# Patient Record
Sex: MALE | Race: BLACK OR AFRICAN AMERICAN | NOT HISPANIC OR LATINO | Employment: OTHER | URBAN - METROPOLITAN AREA
[De-identification: names, ages, dates, MRNs, and addresses within clinical notes are randomized per-mention and may not be internally consistent; named-entity substitution may affect disease eponyms.]

---

## 2017-01-05 RX ORDER — FOLIC ACID 1 MG/1
TABLET ORAL
Qty: 30 TABLET | Refills: 3 | Status: SHIPPED | OUTPATIENT
Start: 2017-01-05 | End: 2017-04-11 | Stop reason: SDUPTHER

## 2017-01-20 ENCOUNTER — OFFICE VISIT (OUTPATIENT)
Dept: OPHTHALMOLOGY | Facility: CLINIC | Age: 57
End: 2017-01-20
Payer: MEDICARE

## 2017-01-20 DIAGNOSIS — H40.43X3 UVEITIC GLAUCOMA, BILATERAL, SEVERE STAGE: Primary | ICD-10-CM

## 2017-01-20 DIAGNOSIS — H54.8 LEGAL BLINDNESS: ICD-10-CM

## 2017-01-20 DIAGNOSIS — H57.09 RELATIVE AFFERENT PUPILLARY DEFECT: ICD-10-CM

## 2017-01-20 DIAGNOSIS — H26.491 PCO (POSTERIOR CAPSULAR OPACIFICATION), RIGHT: ICD-10-CM

## 2017-01-20 DIAGNOSIS — H40.043 STEROID RESPONDERS BORDERLINE GLAUCOMA, BILATERAL: ICD-10-CM

## 2017-01-20 DIAGNOSIS — H47.299: ICD-10-CM

## 2017-01-20 DIAGNOSIS — H21.523 PAS (PERIPH ANTERIOR SYNECHIAE), BILATERAL: ICD-10-CM

## 2017-01-20 DIAGNOSIS — H20.9 UVEITIC GLAUCOMA, BILATERAL, SEVERE STAGE: Primary | ICD-10-CM

## 2017-01-20 DIAGNOSIS — D86.83 IRIDOCYCLITIS DUE TO SARCOIDOSIS: ICD-10-CM

## 2017-01-20 DIAGNOSIS — Z96.1 PSEUDOPHAKIA OF BOTH EYES: ICD-10-CM

## 2017-01-20 PROCEDURE — 99499 UNLISTED E&M SERVICE: CPT | Mod: S$GLB,,, | Performed by: OPHTHALMOLOGY

## 2017-01-20 PROCEDURE — 99999 PR PBB SHADOW E&M-EST. PATIENT-LVL III: CPT | Mod: PBBFAC,,, | Performed by: OPHTHALMOLOGY

## 2017-01-20 PROCEDURE — 92012 INTRM OPH EXAM EST PATIENT: CPT | Mod: S$GLB,,, | Performed by: OPHTHALMOLOGY

## 2017-01-20 NOTE — PROGRESS NOTES
HPI     DLS: 10/21/16    Pt here for 3 month check;    Meds: Combigan bid ou            Travatan qhs os            Dorzolamide tid ou            PA every other day od    1. 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 Uveitic glaucoma, bilateral, severe stage  3. Post inflammatory optic atrophy, unspecified laterality   4. PCO (posterior capsular opacification), right  5. Steroid responders borderline glaucoma, bilateral  6. Relative afferent pupillary defect, left eye  7. PAS (periph anterior synechiae), bilateral  8. Legal blindness, both eyes  9. Pseudophakia, both eyes        Last edited by Candace Fan on 1/20/2017  8:15 AM.         Assessment /Plan     For exam results, see Encounter Report.    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 Uveitic glaucoma - Both Eyes OD>OS  2/2 sarcoidosis  Old pt of Dr. Wagner and Dr. Menard  First HVF 2012  First photos 2011     Family history   neg  Glaucoma meds   Off all drops and diamox 2013 to 2014  (pt lost insurance)(( had +++ progression off gtts)                              back on gtts combigan ou bid / travatan os q day  / PA od every other day - 3 x week  H/O adverse rxn to glaucoma drops  None  LASERS   none  GLAUCOMA SURGERIES   Trab OD (3/20/ 2010 -Derian),  "Bleb Revision OD  3/21/2012 Loft// complex phaco/Baerveldt od 9/24/2014  OTHER EYE SURGERIES   PPV/Endo O5, CE OS (04)  CDR  0.95/0.5  Tbase  ?    Tmax  36/22 on meds      Ttarget   16/20       HVF  10-2 stim III OD (3/5/12)-sup arc,inf alt, tiny residual island OD; 24-2 OS w/ marked general depression             3/4/2013 ---HVF 10-2 stim III OD - SAD / Inf Alt los s           2 test 6591-0103  24-2 - stim V OS  // Inf Alt loss /worsening SAD            4 test 10-2 stim  V 2014 to 2016 - very tiny residual island of vision - essentially ext.od // gen dep - poor test     Gonio -  PAS OD - 360 // OS -  f    CCT  525/531  OCT  4 test 2011 to 2016 - dec. S/I/N od // Dec. I, Bord N  HRT  6 test 2012 to 2016 - MR -  Dec. S, bord I/N od // xx os /// CDR 0.76 od // xx os  Disc photos  2011, 2013, 2014, 2015  - OIS     Ttoday 10/14 (IOP better ou with addition of dorzolamide)   Test done today -IOP    2. Secondary iritis - Both Eyes      3. 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 Postinflammatory optic atrophy - Left Eye   -Sarcoid, + APD, uncertain etiology     5. Relative afferent pupillary defect OS     6. Steroid responders borderline glaucoma - Both Eyes -increased IOP w/ Pred Acetate. Does better w/ Vexol     7. Legal blindness - Both EyeS OD based on VF loss and OS based on VA.     8. Post-operative state - Both Eyes   -s/p bleb revision OD (3/21/12) had extensive subconj fibrosis, but eventually has done well.     9 Pseudophakic OD  S/P complex phaco/IOL / baerveldt od 9/24/2014   PC IOL os - done years ago - ? [facility] - ? IOL sewn in        Plan:  Uveitic glaucoma  Angle closure - PAS ou   Lost to F/U for 10 months from 9/2013 to 7/2014  - VA od went  from 20/40 to LP  Was already CF at 3 ft os- 2/2 ON pallor from sarcoid  IOP OD was 64 - off drops since 9/2013 - when re-presented     Pt is "legally blind" - he may want to apply for disability.    Pt previously had issues with insurance, but now " has new insurance.  Discussed with patient that the vision loss od is irreversible 2/2 glaucoma and the importance of compliance with gtt/meds.     IOP continues to be improved od post tube and back on combigan and dorzolamide   PA  od - for chronic iritis - uses PA every other day  od      - may eventually need yag to PCO od  - but this will trigger a big flare up of the iritis so hold off as long as possible  The anterior chamber fibrin has resolved  - pre treat with steroid gtts and ? PO medrol dose khadra - on MTX   Pt will think about it      Pt has seen the pulmonologist  Has seen an internist   Did not see rheumatology - but they can be consulted if steroid sparing immunosuppression is needed to control the iritis // saw natalie 11/9/2012    Now on MTX for sarcoid and iritis - seems to be helping     IOP was creeping up ou to 17/18 (10/21/2016) better today at 10/14 with addition of dorzolamide (1/20/2017)  Cont combigan  bid ou  Cont PG's (travatan ) OS only  - cont  dorzolamide OU -  twice daily ou   - cont with  PA OD every other day OD      F/U 3 months - try HRT / gonio (may not be able to get HRT 2/2 PCO od and tilted lens OS)     Pt is getting training (first step) at the [facility] - says it is going well.

## 2017-02-06 ENCOUNTER — OFFICE VISIT (OUTPATIENT)
Dept: RHEUMATOLOGY | Facility: CLINIC | Age: 57
End: 2017-02-06
Payer: MEDICARE

## 2017-02-06 VITALS
WEIGHT: 196.31 LBS | DIASTOLIC BLOOD PRESSURE: 78 MMHG | SYSTOLIC BLOOD PRESSURE: 121 MMHG | HEART RATE: 67 BPM | BODY MASS INDEX: 29.07 KG/M2 | HEIGHT: 69 IN | TEMPERATURE: 98 F

## 2017-02-06 DIAGNOSIS — H20.9 UVEITIC GLAUCOMA, BILATERAL, SEVERE STAGE: ICD-10-CM

## 2017-02-06 DIAGNOSIS — D86.9 SARCOIDOSIS: Primary | ICD-10-CM

## 2017-02-06 DIAGNOSIS — D86.83 IRIDOCYCLITIS DUE TO SARCOIDOSIS: ICD-10-CM

## 2017-02-06 DIAGNOSIS — H40.43X3 UVEITIC GLAUCOMA, BILATERAL, SEVERE STAGE: ICD-10-CM

## 2017-02-06 DIAGNOSIS — D84.9 IMMUNOSUPPRESSION: ICD-10-CM

## 2017-02-06 DIAGNOSIS — D86.0 SARCOIDOSIS OF LUNG: ICD-10-CM

## 2017-02-06 PROCEDURE — 99214 OFFICE O/P EST MOD 30 MIN: CPT | Mod: S$GLB,,, | Performed by: INTERNAL MEDICINE

## 2017-02-06 PROCEDURE — 99499 UNLISTED E&M SERVICE: CPT | Mod: S$GLB,,, | Performed by: INTERNAL MEDICINE

## 2017-02-06 PROCEDURE — 3074F SYST BP LT 130 MM HG: CPT | Mod: S$GLB,,, | Performed by: INTERNAL MEDICINE

## 2017-02-06 PROCEDURE — 3078F DIAST BP <80 MM HG: CPT | Mod: S$GLB,,, | Performed by: INTERNAL MEDICINE

## 2017-02-06 PROCEDURE — 99999 PR PBB SHADOW E&M-EST. PATIENT-LVL III: CPT | Mod: PBBFAC,,, | Performed by: INTERNAL MEDICINE

## 2017-02-06 RX ORDER — METHOTREXATE 2.5 MG/1
TABLET ORAL
Qty: 24 TABLET | Refills: 2 | Status: SHIPPED | OUTPATIENT
Start: 2017-02-06 | End: 2017-04-11 | Stop reason: SDUPTHER

## 2017-02-06 NOTE — PROGRESS NOTES
"Subjective:       Patient ID: Jaguar Lackey is a 56 y.o. male.    Chief Complaint: Disease Management      HPI:  Jaguar Lackey is a 56 y.o. 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    He was started on MTX as steroid sparing agent and is tolerating it well.  Currently on 4 tabs MTX and folic acid 1 mg daily.      Patient saw ophthamologist in January and she felt things were better.     Review of Systems   Constitutional: Negative for fatigue.   HENT: Negative.    Eyes: Negative.    Respiratory: Negative.         SOB with walking on treadmill and push ups   Cardiovascular: Negative.    Gastrointestinal: Negative.    Endocrine: Negative.    Genitourinary: Negative.    Musculoskeletal: Negative.    Skin: Negative.    Allergic/Immunologic: Negative.    Neurological: Negative.    Hematological: Negative.    Psychiatric/Behavioral:        Mild anxiety because of inability to do what he used to         Objective:     Visit Vitals    /78 (BP Location: Right arm, Patient Position: Sitting, BP Method: Automatic)    Pulse 67    Temp 97.8 °F (36.6 °C) (Oral)    Ht 5' 9" (1.753 m)    Wt 89 kg (196 lb 4.8 oz)    BMI 28.99 kg/m2        Physical Exam   Constitutional: He is oriented to person, place, and time and well-developed, well-nourished, and in no distress.   HENT:   Head: Normocephalic and atraumatic.   Eyes: Conjunctivae and EOM are normal.   Neck: Neck supple.   Cardiovascular: Normal rate, regular rhythm and normal heart sounds.    Pulmonary/Chest: Effort normal and breath sounds normal.   Abdominal: Soft. Bowel sounds are normal.   Neurological: He is alert and oriented to person, place, and time. Gait normal.   Skin: Skin is warm and dry.     Psychiatric: Mood and affect normal.   Musculoskeletal: Normal range of motion. He exhibits no edema, tenderness or deformity.           LABS    Component      " Latest Ref Rng & Units 11/28/2016   WBC      3.90 - 12.70 K/uL 9.37   RBC      4.60 - 6.20 M/uL 5.27   Hemoglobin      14.0 - 18.0 g/dL 14.7   Hematocrit      40.0 - 54.0 % 44.4   MCV      82 - 98 fL 84   MCH      27.0 - 31.0 pg 27.9   MCHC      32.0 - 36.0 % 33.1   RDW      11.5 - 14.5 % 15.2 (H)   Platelets      150 - 350 K/uL 333   MPV      9.2 - 12.9 fL 10.6   Gran #      1.8 - 7.7 K/uL 4.3   Lymph #      1.0 - 4.8 K/uL 3.6   Mono #      0.3 - 1.0 K/uL 1.2 (H)   Eos #      0.0 - 0.5 K/uL 0.3   Baso #      0.00 - 0.20 K/uL 0.05   Gran%      38.0 - 73.0 % 45.5   Lymph%      18.0 - 48.0 % 38.0   Mono%      4.0 - 15.0 % 12.5   Eosinophil%      0.0 - 8.0 % 2.9   Basophil%      0.0 - 1.9 % 0.5   Differential Method       Automated   Sodium      136 - 145 mmol/L 143   Potassium      3.5 - 5.1 mmol/L 4.0   Chloride      95 - 110 mmol/L 110   CO2      23 - 29 mmol/L 26   Glucose      70 - 110 mg/dL 108   BUN, Bld      6 - 20 mg/dL 16   Creatinine      0.5 - 1.4 mg/dL 1.0   Calcium      8.7 - 10.5 mg/dL 9.4   Total Protein      6.0 - 8.4 g/dL 7.5   Albumin      3.5 - 5.2 g/dL 3.9   Total Bilirubin      0.1 - 1.0 mg/dL 0.2   Alkaline Phosphatase      55 - 135 U/L 74   AST      10 - 40 U/L 23   ALT      10 - 44 U/L 39   Anion Gap      8 - 16 mmol/L 7 (L)   eGFR if African American      >60 mL/min/1.73 m:2 >60.0   eGFR if non African American      >60 mL/min/1.73 m:2 >60.0   CRP      0.0 - 8.2 mg/L 2.9   Sed Rate      0 - 10 mm/Hr 1     Assessment:       1. Sarcoidosis involving the lungs and the eyes. Most prominent is eye involvement.   No skin or joint involvement at this time. Possible skin involvement in the past.   On MTX as immunosuppression to help with eye involvement. Humira denied by insurance.   2. HTN. On medication   3. History of bilateral knee injections in the past.  4. Vitamin D deficiency. Patient on 50,000 units of vitamin D every 2 weeks.   5. Left shoulder pain.  Improved some.  Plan:       1. Check  labs.   2. Continue methotrexate. Consider increasing dose MTX 6 tabs daily. Message sent to Dr. Pratt today to see if this would be beneficial.  3. Humira denied by insurance.   4.  RTO 3 months/prn

## 2017-02-06 NOTE — MR AVS SNAPSHOT
[facility]  [address] [facility] [address] [facility] [address]  Phone: [phone]  Fax: [phone]                  Jaguar Lackey   2017 10:30 AM   Office Visit    Description:  Male : 1960   Provider:  Chhaya Desouza MD   Department:  [facility] - Rheumatology           Reason for Visit     Disease Management           Diagnoses this Visit        Comments    Sarcoidosis    -  Primary     Iridocyclitis due to sarcoidosis         Sarcoidosis of lung         Immunosuppression         Uveitic glaucoma, bilateral, severe stage                To Do List           Future Appointments        Provider Department Dept Phone    2017 9:45 AM LAB, APPOINTMENT [place] [facility] [phone]    2017 10:30 AM Chhaya Desouza MD [facility] [phone]    2017 8:30 AM PERIMETRY, [facility]MP[facility] [phone]    2017 9:45 AM Honey Pratt MD [facility] [phone]    2017 9:00 AM Chhaya Desouza MD [facility] [phone]      Goals (5 Years of Data)     None      Follow-Up and Disposition     Return in about 3 months (around 2017).    Follow-up and Disposition History       These Medications        Disp Refills Start End    methotrexate 2.5 MG Tab 24 tablet 2 2017     TAKE 6 TABLETS BY MOUTH EVERY 7 DAYS    Pharmacy: [facility] [address] [place] [address] AT [facility] Ph #: [phone]         [facility]     [facility] Care Line -  Assistance  Registered nurses in the [facility] provide clinical advisement, health education, appointment booking, and other advisory services.  Call for this free service at [phone].             Medications           Message regarding Medications     Verify the changes and/or additions to your medication regime listed below are the same as  "discussed with your clinician today.  If any of these changes or additions are incorrect, please notify your healthcare provider.             Verify that the below list of medications is an accurate representation of the medications you are currently taking.  If none reported, the list may be blank. If incorrect, please contact your healthcare provider. Carry this list with you in case of emergency.           Current Medications     brimonidine-timolol (COMBIGAN) 0.2-0.5 % Drop Place 1 drop into both eyes 2 (two) times daily.    dorzolamide (TRUSOPT) 2 % ophthalmic solution Place 1 drop into both eyes 2 (two) times daily.    folic acid (FOLVITE) 1 MG tablet Take 1 tablet (1,000 mcg total) by mouth once daily.    folic acid (FOLVITE) 1 MG tablet TAKE 1 TABLET BY MOUTH EVERY DAY    INV amlodipine (NORVASC) 10 MG tablet Take 1 tablet (10 mg total) by mouth once daily.    methotrexate 2.5 MG Tab TAKE 6 TABLETS BY MOUTH EVERY 7 DAYS    pravastatin (PRAVACHOL) 40 MG tablet TAKE ONE TABLET BY MOUTH ONCE DAILY.    prednisoLONE acetate (PRED FORTE) 1 % DrpS Place 1 drop into the right eye every other day.    TRAVATAN Z 0.004 % Drop INSTILL 1 DROP INTO LEFT EYE ONCE DAILY. THIS REPLACE THE PREVIOUS LATANOPROST EYE DROP. STILL ON COMBIGAN    VITAMIN D2 50,000 unit capsule TAKE ONE CAPSULE BY MOUTH ONCE A WEEK           Clinical Reference Information           Your Vitals Were     BP Pulse Temp Height Weight BMI    121/78 (BP Location: Right arm, Patient Position: Sitting, BP Method: Automatic) 67 97.8 °F (36.6 °C) (Oral) 5' 9" (1.753 m) 89 kg (196 lb 4.8 oz) 28.99 kg/m2      Blood Pressure          Most Recent Value    BP  121/78      Allergies as of 2/6/2017     No Known Allergies      Immunizations Administered on Date of Encounter - 2/6/2017     None      Orders Placed During Today's Visit     Recurring Lab Work Interval Expires    C-reactive protein  3 months until 2/6/2020 2/6/2020    CBC auto differential  3 months until " 2/6/2020 2/6/2020    Comprehensive metabolic panel  3 months until 2/6/2020 2/6/2020    Sedimentation rate, manual  3 months until 2/6/2020 2/6/2020      Language Assistance Services     ATTENTION: Language assistance services are available, free of charge. Please call [phone].      ATENCIÓN: Si habla español, tiene a hill disposición servicios gratuitos de asistencia lingüística. Llame al [phone].     [facility] Ý: N?u b?n nói Ti?ng Vi?t, có các d?ch v? h? tr? ngôn ng? mi?n phí dành cho b?n. G?i s? [phone].         [facility] complies with applicable Federal civil rights laws and does not discriminate on the basis of race, color, national origin, age, disability, or sex.

## 2017-02-07 ENCOUNTER — PATIENT MESSAGE (OUTPATIENT)
Dept: RHEUMATOLOGY | Facility: CLINIC | Age: 57
End: 2017-02-07

## 2017-02-27 RX ORDER — PRAVASTATIN SODIUM 40 MG/1
TABLET ORAL
Qty: 30 TABLET | Refills: 7 | Status: SHIPPED | OUTPATIENT
Start: 2017-02-27 | End: 2017-03-02

## 2017-03-01 ENCOUNTER — TELEPHONE (OUTPATIENT)
Dept: RHEUMATOLOGY | Facility: CLINIC | Age: 57
End: 2017-03-01

## 2017-03-01 NOTE — TELEPHONE ENCOUNTER
----- Message from Honey Pratt MD sent at 2/27/2017  5:52 PM CST -----  Not sure if I saw and answered this. Last time i saw him he looked good - minimal iritis. So I feel we can leave it at the level it is at now.   ----- Message -----     From: Chhaya Desouza MD     Sent: 2/6/2017   9:39 AM       To: Honey Pratt MD    Do you feel he would benefit from an increase in Methotrexate to 6 tabs?

## 2017-03-02 DIAGNOSIS — D86.9 SARCOIDOSIS: ICD-10-CM

## 2017-03-02 DIAGNOSIS — D86.83 IRIDOCYCLITIS DUE TO SARCOIDOSIS: ICD-10-CM

## 2017-03-02 DIAGNOSIS — H20.9 UVEITIC GLAUCOMA, BILATERAL, SEVERE STAGE: ICD-10-CM

## 2017-03-02 DIAGNOSIS — H40.43X3 UVEITIC GLAUCOMA, BILATERAL, SEVERE STAGE: ICD-10-CM

## 2017-03-02 RX ORDER — PRAVASTATIN SODIUM 40 MG/1
TABLET ORAL
Qty: 30 TABLET | Refills: 9 | Status: SHIPPED | OUTPATIENT
Start: 2017-03-02 | End: 2017-04-11 | Stop reason: SDUPTHER

## 2017-03-02 RX ORDER — METHOTREXATE 2.5 MG/1
TABLET ORAL
Qty: 16 TABLET | Refills: 0 | OUTPATIENT
Start: 2017-03-02

## 2017-03-03 RX ORDER — SULFASALAZINE 500 MG/1
TABLET, DELAYED RELEASE ORAL
Qty: 60 TABLET | Refills: 0 | OUTPATIENT
Start: 2017-03-03

## 2017-04-11 ENCOUNTER — TELEPHONE (OUTPATIENT)
Dept: RHEUMATOLOGY | Facility: CLINIC | Age: 57
End: 2017-04-11

## 2017-04-11 DIAGNOSIS — D86.83 IRIDOCYCLITIS DUE TO SARCOIDOSIS: ICD-10-CM

## 2017-04-11 DIAGNOSIS — H40.43X3 UVEITIC GLAUCOMA, BILATERAL, SEVERE STAGE: ICD-10-CM

## 2017-04-11 DIAGNOSIS — H20.9 UVEITIC GLAUCOMA, BILATERAL, SEVERE STAGE: ICD-10-CM

## 2017-04-11 DIAGNOSIS — D86.9 SARCOIDOSIS: ICD-10-CM

## 2017-04-11 RX ORDER — METHOTREXATE 2.5 MG/1
TABLET ORAL
Qty: 24 TABLET | Refills: 0 | Status: SHIPPED | OUTPATIENT
Start: 2017-04-11 | End: 2017-05-18 | Stop reason: SDUPTHER

## 2017-04-11 RX ORDER — FOLIC ACID 1 MG/1
1000 TABLET ORAL DAILY
Qty: 30 TABLET | Refills: 0 | Status: SHIPPED | OUTPATIENT
Start: 2017-04-11 | End: 2017-05-17 | Stop reason: SDUPTHER

## 2017-04-11 RX ORDER — METHOTREXATE 2.5 MG/1
TABLET ORAL
Qty: 24 TABLET | Refills: 2 | Status: CANCELLED | OUTPATIENT
Start: 2017-04-11

## 2017-04-11 NOTE — TELEPHONE ENCOUNTER
----- Message from Angelica Alaniz MA sent at 4/11/2017  2:36 PM CDT -----  Contact: self/home      ----- Message -----     From: Christiane Estes     Sent: 4/11/2017   2:31 PM       To: Elisha [facility]TM[facility] Staff    Pt would like every rx that is prescribed by Dr. Lyons to be transferred to [facility] on [address]

## 2017-04-11 NOTE — TELEPHONE ENCOUNTER
----- Message from Devika Fields sent at 4/11/2017  2:14 PM CDT -----  Contact: patient [phone]  Pt was supposed to have all of his medication transferred to [facility] since [facility] is no longer the preferred provider for [facility]. Pt said that they told him this was being handled 1 week ago and [facility] told him that they don't have any rx orders. Please call pt to advise.    [facility] [address]

## 2017-04-12 RX ORDER — PRAVASTATIN SODIUM 40 MG/1
40 TABLET ORAL DAILY
Qty: 30 TABLET | Refills: 9 | Status: SHIPPED | OUTPATIENT
Start: 2017-04-12 | End: 2017-07-24 | Stop reason: SDUPTHER

## 2017-04-12 RX ORDER — AMLODIPINE BESYLATE 10 MG/1
10 TABLET ORAL DAILY
Qty: 30 TABLET | Refills: 9 | Status: SHIPPED | OUTPATIENT
Start: 2017-04-12 | End: 2017-07-24 | Stop reason: SDUPTHER

## 2017-04-12 NOTE — TELEPHONE ENCOUNTER
----- Message from Carmenza Meneses sent at 4/12/2017  9:45 AM CDT -----  Contact: Patient  The patient needs all the prescriptions Dr. Desir prescribes for him transferred to [facility]    [id number] [address] [facility] [phone] (Phone)  [phone] (Fax)    The patient is out of his prescriptions.      Thanks!

## 2017-04-21 ENCOUNTER — CLINICAL SUPPORT (OUTPATIENT)
Dept: OPHTHALMOLOGY | Facility: CLINIC | Age: 57
End: 2017-04-21
Payer: MEDICARE

## 2017-04-21 ENCOUNTER — OFFICE VISIT (OUTPATIENT)
Dept: OPHTHALMOLOGY | Facility: CLINIC | Age: 57
End: 2017-04-21
Payer: MEDICARE

## 2017-04-21 DIAGNOSIS — H40.43X3 UVEITIC GLAUCOMA, BILATERAL, SEVERE STAGE: ICD-10-CM

## 2017-04-21 DIAGNOSIS — H20.9 UVEITIC GLAUCOMA, BILATERAL, SEVERE STAGE: Primary | ICD-10-CM

## 2017-04-21 DIAGNOSIS — H21.523 PAS (PERIPH ANTERIOR SYNECHIAE), BILATERAL: ICD-10-CM

## 2017-04-21 DIAGNOSIS — H40.043 STEROID RESPONDERS BORDERLINE GLAUCOMA, BILATERAL: ICD-10-CM

## 2017-04-21 DIAGNOSIS — D86.83 IRIDOCYCLITIS DUE TO SARCOIDOSIS: ICD-10-CM

## 2017-04-21 DIAGNOSIS — H47.292 POSTINFLAMMATORY OPTIC ATROPHY, LEFT: ICD-10-CM

## 2017-04-21 DIAGNOSIS — H18.422 BAND KERATOPATHY, LEFT: ICD-10-CM

## 2017-04-21 DIAGNOSIS — H57.09 RELATIVE AFFERENT PUPILLARY DEFECT: ICD-10-CM

## 2017-04-21 DIAGNOSIS — H26.491 PCO (POSTERIOR CAPSULAR OPACIFICATION), RIGHT: ICD-10-CM

## 2017-04-21 DIAGNOSIS — H54.8 LEGAL BLINDNESS: ICD-10-CM

## 2017-04-21 DIAGNOSIS — H40.43X3 UVEITIC GLAUCOMA, BILATERAL, SEVERE STAGE: Primary | ICD-10-CM

## 2017-04-21 DIAGNOSIS — H20.9 UVEITIC GLAUCOMA, BILATERAL, SEVERE STAGE: ICD-10-CM

## 2017-04-21 DIAGNOSIS — Z96.1 PSEUDOPHAKIA OF BOTH EYES: ICD-10-CM

## 2017-04-21 PROCEDURE — 99999 PR PBB SHADOW E&M-EST. PATIENT-LVL II: CPT | Mod: PBBFAC,,, | Performed by: OPHTHALMOLOGY

## 2017-04-21 PROCEDURE — 92014 COMPRE OPH EXAM EST PT 1/>: CPT | Mod: S$GLB,,, | Performed by: OPHTHALMOLOGY

## 2017-04-21 PROCEDURE — 99499 UNLISTED E&M SERVICE: CPT | Mod: S$GLB,,, | Performed by: OPHTHALMOLOGY

## 2017-04-21 PROCEDURE — 92134 CPTRZ OPH DX IMG PST SGM RTA: CPT | Mod: S$GLB,,, | Performed by: OPHTHALMOLOGY

## 2017-04-21 NOTE — PROGRESS NOTES
HPI     Glaucoma    Additional comments: HRT review today           Blurred Vision    Additional comments: Pt states his vision is more cloudy than before           Comments   DLS: 1/20/17    1) Uveitic Glaucoma OD>OS  2) Secondary Iritis  3) Sarcoidosis  4) APD OS  5) Steroid Responder  6) Optic Atrophy  7) PCIOL OU  8) Legally Blind    MEDS:  PA QOD OD  Combigan BID OU  Dorzolamide BID OU (PT USING OS ONLY NOT OU)  Travatan Z QHS OS            Last edited by Angelina Elizabeth MA on 4/21/2017  9:03 AM. (History)            Assessment /Plan     For exam results, see Encounter Report.    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 Uveitic glaucoma - Both Eyes OD>OS  2/2 sarcoidosis  Old pt of Dr. Wagner and Dr. Menard  First HVF 2012  First photos 2011     Family history   neg  Glaucoma meds   Off all drops and diamox 2013 to 2014  (pt lost insurance)(( had +++ progression off gtts)                              back on gtts combigan ou bid / travatan os q day  / PA od every other day - 3 x week  H/O adverse rxn to glaucoma drops  None  LASERS   none  GLAUCOMA SURGERIES   Trab OD (3/20/ 2010 -Derian), Bleb Revision OD  3/21/2012 Loft// complex phaco/Baerveldt od 9/24/2014  OTHER EYE SURGERIES   PPV/Endo O5, CE OS  "(04)  CDR  0.95/0.5  Tbase  ?    Tmax  36/22 on meds      Ttarget   16/20       HVF  10-2 stim III OD (3/5/12)-sup arc,inf alt, tiny residual island OD; 24-2 OS w/ marked general depression             3/4/2013 ---HVF 10-2 stim III OD - SAD / Inf Alt los s           2 test 3805-0000  24-2 - stim V OS  // Inf Alt loss /worsening SAD            4 test 10-2 stim  V 2014 to 2016 - very tiny residual island of vision - essentially ext.od // gen dep - poor test     Gonio -  PAS OD - 360 // OS -  f    CCT  525/531  OCT  4 test 2011 to 2016 - dec. S/I/N od // Dec. I, Bord N  HRT  7 test 2012 to 04/2017 - MR -  Dec. S, bord I/N od // xx os /// CDR 0.79 od // xx os  Disc photos  2011, 2013, 2014, 2015  - OIS     Ttoday 14/15   Test done today -IOP, HRT   2. Secondary iritis - Both Eyes      3. 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 Postinflammatory optic atrophy - Left Eye   -Sarcoid, + APD, uncertain etiology     5. Relative afferent pupillary defect OS     6. Steroid responders borderline glaucoma - Both Eyes -increased IOP w/ Pred Acetate. Does better w/ Vexol     7. Legal blindness - Both EyeS OD based on VF loss and OS based on VA.     8. Post-operative state - Both Eyes   -s/p bleb revision OD (3/21/12) had extensive subconj fibrosis, but eventually has done well.     9 Pseudophakic OD  S/P complex phaco/IOL / baerveldt od 9/24/2014   PC IOL os - done years ago - ? [facility] - ? IOL sewn in        Plan:  Uveitic glaucoma  Angle closure - PAS ou   Lost to F/U for 10 months from 9/2013 to 7/2014  - VA od went  from 20/40 to LP  Was already CF at 3 ft os- 2/2 ON pallor from sarcoid  IOP OD was 64 - off drops since 9/2013 - when re-presented     Pt is "legally blind" - he may want to apply for disability.    Pt previously had issues with insurance, but now has new insurance.  Discussed with patient that the vision loss od is irreversible 2/2 glaucoma and the importance of compliance with gtt/meds.     IOP " continues to be improved od post tube and back on combigan and dorzolamide   PA  od - for chronic iritis - uses PA every other day  od      - may eventually need yag to PCO od  - but this will trigger a big flare up of the iritis so hold off as long as possible  The anterior chamber fibrin has resolved  - pre treat with steroid gtts and ? PO medrol dose khadra - on MTX   Pt will think about it      Pt has seen the pulmonologist  Has seen an internist   Did not see rheumatology - but they can be consulted if steroid sparing immunosuppression is needed to control the iritis // saw natalie 11/9/2012    Now on MTX for sarcoid and iritis - seems to be helping     IOP up today 14//15 --> not using dorzolamide correctly  Cont combigan  bid ou  Cont PG's (travatan ) OS only  - cont  dorzolamide OU -  twice daily ou --> educated patient to use in BOTH eyes (states he only uses OD)  - cont with  PA OD every other day OD     Complains of blurry vision OS that is worsening --? Band keratopathy? Can refer for removal w/ cornea      F/U 4 months with HVF 10-2 stim V od and 24-2 stim V os // DFE // OCT     consult dr Yan -   Pt notes further decrease in vision os - only new finding is a worsening band keratopathy   Please evaluate and consider EDTA chelation os     Pt is getting training (first step) at the [facility] - says it is going well.

## 2017-05-02 ENCOUNTER — INITIAL CONSULT (OUTPATIENT)
Dept: OPHTHALMOLOGY | Facility: CLINIC | Age: 57
End: 2017-05-02
Payer: MEDICARE

## 2017-05-02 DIAGNOSIS — H47.299: ICD-10-CM

## 2017-05-02 DIAGNOSIS — H20.9 UVEITIC GLAUCOMA, BILATERAL, SEVERE STAGE: ICD-10-CM

## 2017-05-02 DIAGNOSIS — H40.043 STEROID RESPONDERS BORDERLINE GLAUCOMA, BILATERAL: ICD-10-CM

## 2017-05-02 DIAGNOSIS — H18.422 BAND KERATOPATHY, LEFT: Primary | ICD-10-CM

## 2017-05-02 DIAGNOSIS — D86.83 IRIDOCYCLITIS DUE TO SARCOIDOSIS: ICD-10-CM

## 2017-05-02 DIAGNOSIS — H40.43X3 UVEITIC GLAUCOMA, BILATERAL, SEVERE STAGE: ICD-10-CM

## 2017-05-02 DIAGNOSIS — H26.491 POSTERIOR CAPSULAR OPACIFICATION VISUALLY SIGNIFICANT, RIGHT EYE: ICD-10-CM

## 2017-05-02 PROCEDURE — 99499 UNLISTED E&M SERVICE: CPT | Mod: S$GLB,,, | Performed by: OPHTHALMOLOGY

## 2017-05-02 PROCEDURE — 92014 COMPRE OPH EXAM EST PT 1/>: CPT | Mod: S$GLB,,, | Performed by: OPHTHALMOLOGY

## 2017-05-02 PROCEDURE — 99999 PR PBB SHADOW E&M-EST. PATIENT-LVL II: CPT | Mod: PBBFAC,,, | Performed by: OPHTHALMOLOGY

## 2017-05-02 NOTE — LETTER
May 2, 2017      Honey Pratt MD  [address]  [facility] [address]           [facility]  [address]  [facility] [address]  Phone: [phone]  Fax: [phone]          Patient: Jaguar Lackey   MR Number: [id number]   YOB: 1960   Date of Visit: 5/2/2017       Dear Dr. Honey Pratt:    Thank you for referring Jaguar Lackey to me for evaluation. Attached you will find relevant portions of my assessment and plan of care.    If you have questions, please do not hesitate to call me. I look forward to following Jaguar Lackey along with you.    Sincerely,    Thu Yan MD    Enclosure  CC:  No Recipients    If you would like to receive this communication electronically, please contact [email or web address] or [phone] to request more information on [facility] access.    For providers and/or their staff who would like to refer a patient to [facility], please contact us through our one-stop-shop provider referral line, [facility], at [phone].    If you feel you have received this communication in error or would no longer like to receive these types of communications, please e-mail [email or web address]

## 2017-05-02 NOTE — PROGRESS NOTES
HPI     Referral  - band K OS    Patient states OD vision as decrease,yuri cloudy. No pain      1) Uveitic Glaucoma OD>OS  2) Secondary Iritis  3) Sarcoidosis  4) APD OS  5) Steroid Responder  6) Optic Atrophy  7) PCIOL OU  8) Legally Blind    MEDS:  PA QOD OD  Combigan BID OU  Dorzolamide BID OU (PT USING OS ONLY NOT OU)  Travatan Z QHS OS            Last edited by Thu Yan MD on 5/2/2017  2:47 PM.         Assessment /Plan     For exam results, see Encounter Report.    Band keratopathy, left    Posterior capsular opacification visually significant, right eye    Postinflammatory optic atrophy, unspecified laterality    Iridocyclitis due to sarcoidosis - Both Eyes    Uveitic glaucoma, bilateral, severe stage    Steroid responders borderline glaucoma, bilateral      Band K OS  - inferior, not central, doubt VS  - not causing chronic FBS  - observe for now    Sarcoid with uveitic glaucoma  - cpm w gtts    PCO OD  - may be VS, agree with plan to pre-tx with steroids and oral steroids to help prevent significant inflammation    Monocular precautions - full time polycarb lenses to be worn at all times.    F/up dr. cueto as scheduled, sooner PRN

## 2017-05-15 DIAGNOSIS — H40.43X3 UVEITIC GLAUCOMA, BILATERAL, SEVERE STAGE: ICD-10-CM

## 2017-05-15 DIAGNOSIS — D86.9 SARCOIDOSIS: ICD-10-CM

## 2017-05-15 DIAGNOSIS — H20.9 UVEITIC GLAUCOMA, BILATERAL, SEVERE STAGE: ICD-10-CM

## 2017-05-15 DIAGNOSIS — D86.83 IRIDOCYCLITIS DUE TO SARCOIDOSIS: ICD-10-CM

## 2017-05-16 RX ORDER — FOLIC ACID 1 MG/1
TABLET ORAL
Qty: 30 TABLET | Refills: 0 | OUTPATIENT
Start: 2017-05-16

## 2017-05-16 RX ORDER — METHOTREXATE 2.5 MG/1
TABLET ORAL
Qty: 24 TABLET | Refills: 0 | OUTPATIENT
Start: 2017-05-16

## 2017-05-17 ENCOUNTER — OFFICE VISIT (OUTPATIENT)
Dept: RHEUMATOLOGY | Facility: CLINIC | Age: 57
End: 2017-05-17
Payer: MEDICARE

## 2017-05-17 VITALS
HEART RATE: 57 BPM | DIASTOLIC BLOOD PRESSURE: 86 MMHG | HEIGHT: 69 IN | BODY MASS INDEX: 28.98 KG/M2 | WEIGHT: 195.69 LBS | SYSTOLIC BLOOD PRESSURE: 134 MMHG

## 2017-05-17 DIAGNOSIS — D86.83 IRIDOCYCLITIS DUE TO SARCOIDOSIS: ICD-10-CM

## 2017-05-17 DIAGNOSIS — D86.9 SARCOIDOSIS: ICD-10-CM

## 2017-05-17 DIAGNOSIS — D84.9 IMMUNOSUPPRESSION: ICD-10-CM

## 2017-05-17 DIAGNOSIS — H40.43X3 UVEITIC GLAUCOMA, BILATERAL, SEVERE STAGE: ICD-10-CM

## 2017-05-17 DIAGNOSIS — D86.9 SARCOIDOSIS: Primary | ICD-10-CM

## 2017-05-17 DIAGNOSIS — H20.9 UVEITIC GLAUCOMA, BILATERAL, SEVERE STAGE: ICD-10-CM

## 2017-05-17 PROCEDURE — 3079F DIAST BP 80-89 MM HG: CPT | Mod: S$GLB,,, | Performed by: INTERNAL MEDICINE

## 2017-05-17 PROCEDURE — 3075F SYST BP GE 130 - 139MM HG: CPT | Mod: S$GLB,,, | Performed by: INTERNAL MEDICINE

## 2017-05-17 PROCEDURE — 1160F RVW MEDS BY RX/DR IN RCRD: CPT | Mod: S$GLB,,, | Performed by: INTERNAL MEDICINE

## 2017-05-17 PROCEDURE — 99999 PR PBB SHADOW E&M-EST. PATIENT-LVL III: CPT | Mod: PBBFAC,,, | Performed by: INTERNAL MEDICINE

## 2017-05-17 PROCEDURE — 99214 OFFICE O/P EST MOD 30 MIN: CPT | Mod: S$GLB,,, | Performed by: INTERNAL MEDICINE

## 2017-05-17 PROCEDURE — 99499 UNLISTED E&M SERVICE: CPT | Mod: S$GLB,,, | Performed by: INTERNAL MEDICINE

## 2017-05-17 RX ORDER — METHOTREXATE 2.5 MG/1
TABLET ORAL
Qty: 24 TABLET | Refills: 0 | Status: CANCELLED | OUTPATIENT
Start: 2017-05-17

## 2017-05-17 NOTE — MR AVS SNAPSHOT
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    Follow-up and Disposition History      [facility]     [facility] Nurse Care Line -  Assistance  Unless otherwise directed by your provider, please contact [facility], our nurse care line that is available for  assistance.     Registered nurses in the [facility] provide: appointment scheduling, clinical advisement, health education, and other advisory services.  Call: [phone] (toll free)               Medications           Message regarding Medications     Verify the changes and/or additions to your medication regime listed below are the same as discussed with your clinician today.  If any of these changes or additions are incorrect, please notify your healthcare provider.             Verify that the below list of medications is an accurate representation of the medications you are currently taking.  If none reported, the list may be blank. If incorrect, please contact your healthcare provider. Carry this list with you in case of emergency.           Current Medications     amlodipine (NORVASC) 10 MG tablet Take 1 tablet (10 mg total)  "by mouth once daily.    brimonidine-timolol (COMBIGAN) 0.2-0.5 % Drop Place 1 drop into both eyes 2 (two) times daily.    dorzolamide (TRUSOPT) 2 % ophthalmic solution Place 1 drop into both eyes 2 (two) times daily.    folic acid (FOLVITE) 1 MG tablet Take 1 tablet (1,000 mcg total) by mouth once daily.    methotrexate 2.5 MG Tab TAKE 6 TABLETS BY MOUTH EVERY 7 DAYS    pravastatin (PRAVACHOL) 40 MG tablet Take 1 tablet (40 mg total) by mouth once daily.    prednisoLONE acetate (PRED FORTE) 1 % DrpS Place 1 drop into the right eye every other day.    TRAVATAN Z 0.004 % Drop INSTILL 1 DROP INTO LEFT EYE ONCE DAILY. THIS REPLACE THE PREVIOUS LATANOPROST EYE DROP. STILL ON COMBIGAN    VITAMIN D2 50,000 unit capsule TAKE ONE CAPSULE BY MOUTH ONCE A WEEK           Clinical Reference Information           Your Vitals Were     BP Pulse Height Weight BMI    134/86 (BP Location: Left arm, Patient Position: Sitting, BP Method: Automatic) 57 5' 9" (1.753 m) 88.8 kg (195 lb 11.2 oz) 28.9 kg/m2      Blood Pressure          Most Recent Value    BP  134/86      Allergies as of 5/17/2017     No Known Allergies      Immunizations Administered on Date of Encounter - 5/17/2017     None      Language Assistance Services     ATTENTION: Language assistance services are available, free of charge. Please call [phone].      ATENCIÓN: Si habla español, tiene a hill disposición servicios gratuitos de asistencia lingüística. Llame al [phone].     [facility] Ý: N?u b?n nói Ti?ng Vi?t, có các d?ch v? h? tr? ngôn ng? mi?n phí dành cho b?n. G?i s? [phone].         [facility] complies with applicable Federal civil rights laws and does not discriminate on the basis of race, color, national origin, age, disability, or sex.        "

## 2017-05-17 NOTE — PROGRESS NOTES
"Subjective:       Patient ID: Jaguar Lackey is a 57 y.o. male.    Chief Complaint: Follow-up      HPI:  Jaguar Lackey is a 57 y.o. 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    He was started on MTX as steroid sparing agent and is tolerating it well.  Currently on 4 tabs MTX and folic acid 1 mg daily.       Patient saw ophthamologist said has calcium build up around right eye that obstructs peripheral vision.  In left eye there is a little obstruction in tube that was placed.  Fearful of lasering obstruction due to fear that iritis may flare.    Physically able to do what he would like without difficulty.  Stopped going to gym due to time.    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     Review of Systems   Constitutional: Positive for fatigue.   HENT: Negative.    Eyes: Positive for visual disturbance.   Respiratory: Negative.    Cardiovascular: Negative.    Gastrointestinal: Negative.    Endocrine: Negative.    Genitourinary: Negative.    Musculoskeletal: Negative.    Skin: Negative.    Allergic/Immunologic: Negative.    Neurological: Negative.    Hematological: Negative.    Psychiatric/Behavioral: Negative.          Objective:   /86 (BP Location: Left arm, Patient Position: Sitting, BP Method: Automatic)  Pulse (!) 57  Ht 5' 9" (1.753 m)  Wt 88.8 kg (195 lb 11.2 oz)  BMI 28.9 kg/m2     Physical Exam   Constitutional: He is oriented to person, place, and time and well-developed, well-nourished, and in no distress.   HENT:   Head: Normocephalic and atraumatic.   Eyes: Conjunctivae and EOM are normal.   Neck: Neck supple.   Cardiovascular: Normal rate, regular rhythm and normal heart sounds.    Pulmonary/Chest: Breath sounds normal.   Abdominal: Soft. Bowel sounds are normal.   Neurological: He is alert and oriented to person, place, and time. Gait normal.   Walks with white cane   Skin: " Skin is warm and dry.     Psychiatric: Mood and affect normal.   Musculoskeletal: Normal range of motion. He exhibits no edema or tenderness.          LABS    Component      Latest Ref Rng & Units 2/6/2017   WBC      3.90 - 12.70 K/uL 7.37   RBC      4.60 - 6.20 M/uL 5.09   Hemoglobin      14.0 - 18.0 g/dL 14.5   Hematocrit      40.0 - 54.0 % 42.7   MCV      82 - 98 fL 84   MCH      27.0 - 31.0 pg 28.5   MCHC      32.0 - 36.0 % 34.0   RDW      11.5 - 14.5 % 14.3   Platelets      150 - 350 K/uL 317   MPV      9.2 - 12.9 fL 9.9   Gran #      1.8 - 7.7 K/uL 3.4   Lymph #      1.0 - 4.8 K/uL 3.0   Mono #      0.3 - 1.0 K/uL 0.8   Eos #      0.0 - 0.5 K/uL 0.2   Baso #      0.00 - 0.20 K/uL 0.04   Gran%      38.0 - 73.0 % 45.8   Lymph%      18.0 - 48.0 % 40.0   Mono%      4.0 - 15.0 % 11.1   Eosinophil%      0.0 - 8.0 % 2.3   Basophil%      0.0 - 1.9 % 0.5   Differential Method       Automated   Sodium      136 - 145 mmol/L 142   Potassium      3.5 - 5.1 mmol/L 4.1   Chloride      95 - 110 mmol/L 110   CO2      23 - 29 mmol/L 27   Glucose      70 - 110 mg/dL 101   BUN, Bld      6 - 20 mg/dL 15   Creatinine      0.5 - 1.4 mg/dL 1.0   Calcium      8.7 - 10.5 mg/dL 9.4   Total Protein      6.0 - 8.4 g/dL 7.4   Albumin      3.5 - 5.2 g/dL 3.9   Total Bilirubin      0.1 - 1.0 mg/dL 0.3   Alkaline Phosphatase      55 - 135 U/L 76   AST      10 - 40 U/L 26   ALT      10 - 44 U/L 42   Anion Gap      8 - 16 mmol/L 5 (L)   eGFR if African American      >60 mL/min/1.73 m:2 >60.0   eGFR if non African American      >60 mL/min/1.73 m:2 >60.0   Vit D, 25-Hydroxy      30 - 96 ng/mL 24 (L)   CRP      0.0 - 8.2 mg/L 3.9   Sed Rate      0 - 10 mm/Hr 4     Assessment:       1. Sarcoidosis involving the lungs and the eyes. Most prominent is eye involvement.   No skin or joint involvement at this time. Possible skin involvement in the past.   On MTX as immunosuppression to help with eye involvement. Humira denied by insurance.   2. HTN. On  medication   3. History of bilateral knee injections in the past.  4. Vitamin D deficiency. Patient on 50,000 units of vitamin D every 2 weeks.   5. Left shoulder pain. Improved some.  Plan:       1. Check labs.   2. Continue methotrexate. Consider increasing dose MTX 6 tabs daily.   Patient to see Dr. Pratt Turogerday and will discuss if this would be beneficial.  3. Humira denied by insurance.   4. Restart going to gym    RTO 3 months/prn

## 2017-05-18 ENCOUNTER — PATIENT MESSAGE (OUTPATIENT)
Dept: RHEUMATOLOGY | Facility: CLINIC | Age: 57
End: 2017-05-18

## 2017-05-18 DIAGNOSIS — D86.83 IRIDOCYCLITIS DUE TO SARCOIDOSIS: ICD-10-CM

## 2017-05-18 DIAGNOSIS — H40.43X3 UVEITIC GLAUCOMA, BILATERAL, SEVERE STAGE: ICD-10-CM

## 2017-05-18 DIAGNOSIS — D86.9 SARCOIDOSIS: ICD-10-CM

## 2017-05-18 DIAGNOSIS — H20.9 UVEITIC GLAUCOMA, BILATERAL, SEVERE STAGE: ICD-10-CM

## 2017-05-18 RX ORDER — FOLIC ACID 1 MG/1
1000 TABLET ORAL DAILY
Qty: 30 TABLET | Refills: 0 | Status: SHIPPED | OUTPATIENT
Start: 2017-05-18 | End: 2017-07-07 | Stop reason: SDUPTHER

## 2017-05-18 RX ORDER — METHOTREXATE 2.5 MG/1
TABLET ORAL
Qty: 24 TABLET | Refills: 2 | Status: SHIPPED | OUTPATIENT
Start: 2017-05-18 | End: 2018-07-30

## 2017-07-07 DIAGNOSIS — D86.83 IRIDOCYCLITIS DUE TO SARCOIDOSIS: ICD-10-CM

## 2017-07-07 DIAGNOSIS — D86.9 SARCOIDOSIS: ICD-10-CM

## 2017-07-07 DIAGNOSIS — H20.9 UVEITIC GLAUCOMA, BILATERAL, SEVERE STAGE: ICD-10-CM

## 2017-07-07 DIAGNOSIS — H40.43X3 UVEITIC GLAUCOMA, BILATERAL, SEVERE STAGE: ICD-10-CM

## 2017-07-07 RX ORDER — FOLIC ACID 1 MG/1
1000 TABLET ORAL DAILY
Qty: 30 TABLET | Refills: 3 | Status: SHIPPED | OUTPATIENT
Start: 2017-07-07 | End: 2018-05-16

## 2017-07-24 RX ORDER — PRAVASTATIN SODIUM 40 MG/1
40 TABLET ORAL DAILY
Qty: 30 TABLET | Refills: 9 | Status: SHIPPED | OUTPATIENT
Start: 2017-07-24 | End: 2018-03-22

## 2017-07-24 RX ORDER — AMLODIPINE BESYLATE 10 MG/1
10 TABLET ORAL DAILY
Qty: 30 TABLET | Refills: 9 | Status: SHIPPED | OUTPATIENT
Start: 2017-07-24 | End: 2018-03-22

## 2017-07-24 NOTE — TELEPHONE ENCOUNTER
----- Message from Halle Guallpa sent at 7/24/2017 10:28 AM CDT -----  Contact: [facility]/ [phone] (Phone)  RX request - refill or new RX.  Is this a refill or new RX:    RX name and strength: pravastatin (PRAVACHOL) 40 MG tablet  Directions: Take 1 tablet (40 mg total) by mouth once daily. - Oral  Is this a 30 day or 90 day RX:    Pharmacy name and phone #: [facility] -[phone] (Phone)  [phone] (Fax)   Comments:      RX request - refill or new RX.  Is this a refill or new RX:  new  RX name and strength: amlodipine (NORVASC) 10 MG tablet  Directions: Take 1 tablet (10 mg total) by mouth once daily. - Oral  Is this a 30 day or 90 day RX:  90  Pharmacy name and phone #:   Comments:

## 2017-08-07 ENCOUNTER — CLINICAL SUPPORT (OUTPATIENT)
Dept: OPHTHALMOLOGY | Facility: CLINIC | Age: 57
End: 2017-08-07
Payer: MEDICARE

## 2017-08-07 ENCOUNTER — OFFICE VISIT (OUTPATIENT)
Dept: OPHTHALMOLOGY | Facility: CLINIC | Age: 57
End: 2017-08-07
Payer: MEDICARE

## 2017-08-07 DIAGNOSIS — H20.9 UVEITIC GLAUCOMA, BILATERAL, SEVERE STAGE: ICD-10-CM

## 2017-08-07 DIAGNOSIS — H40.43X3 UVEITIC GLAUCOMA, BILATERAL, SEVERE STAGE: ICD-10-CM

## 2017-08-07 DIAGNOSIS — H26.491 PCO (POSTERIOR CAPSULAR OPACIFICATION), RIGHT: ICD-10-CM

## 2017-08-07 DIAGNOSIS — H47.292 POSTINFLAMMATORY OPTIC ATROPHY, LEFT: ICD-10-CM

## 2017-08-07 DIAGNOSIS — H26.491 POSTERIOR CAPSULAR OPACIFICATION VISUALLY SIGNIFICANT, RIGHT EYE: ICD-10-CM

## 2017-08-07 DIAGNOSIS — D86.83 IRIDOCYCLITIS DUE TO SARCOIDOSIS: ICD-10-CM

## 2017-08-07 DIAGNOSIS — H18.422 BAND KERATOPATHY, LEFT: Primary | ICD-10-CM

## 2017-08-07 PROCEDURE — 92014 COMPRE OPH EXAM EST PT 1/>: CPT | Mod: S$GLB,,, | Performed by: OPHTHALMOLOGY

## 2017-08-07 PROCEDURE — 92083 EXTENDED VISUAL FIELD XM: CPT | Mod: S$GLB,,, | Performed by: OPHTHALMOLOGY

## 2017-08-07 PROCEDURE — 99499 UNLISTED E&M SERVICE: CPT | Mod: S$GLB,,, | Performed by: OPHTHALMOLOGY

## 2017-08-07 PROCEDURE — 99999 PR PBB SHADOW E&M-EST. PATIENT-LVL II: CPT | Mod: PBBFAC,,, | Performed by: OPHTHALMOLOGY

## 2017-08-07 PROCEDURE — 92133 CPTRZD OPH DX IMG PST SGM ON: CPT | Mod: S$GLB,,, | Performed by: OPHTHALMOLOGY

## 2017-08-07 NOTE — PROGRESS NOTES
HPI     DLS: 4/21/17    Pt here for HVF review;  Pt states he did experienced some pain in OD more of a stabbing pain that   lasted off and on for 2 1/2 hours. Pt states when he seen his internist   back in July he was told that the corner of his OS nasally was red no pain   or discomfort just red.     Meds: PA QOD od            Combigan bid ou            Travatan Z qhs os            Dorzolamide bid ou     1. Uveitic glaucoma, bilateral, severe stage  2. 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 Postinflammatory optic atrophy, left  4. PCO (posterior capsular opacification), right  5. Steroid responders borderline glaucoma, bilateral  6. Relative afferent pupillary defect, left eye  7. PAS (periph anterior synechiae), bilateral  8. Legal blindness, both eyes   9. Band keratopathy, left  10. Pseudophakia, both eyes     Last edited by Candace Fan on 8/7/2017 11:46 AM. (History)            Assessment /Plan     For exam results, see Encounter Report.    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 Uveitic glaucoma - Both Eyes OD>OS  2/2 sarcoidosis  Old pt of Dr. Wagner and Dr. Derian Avila HVF 2012  First photos 2011     Family history   neg  Glaucoma meds   Off all drops and diamox 2013 to 2014  (pt lost insurance)(( had +++ progression off gtts)                              back on gtts  combigan ou bid / travatan os q day  / PA od every other day - 3 x week  H/O adverse rxn to glaucoma drops  None  LASERS   none  GLAUCOMA SURGERIES   Trab OD (3/20/ 2010 -Derian), Bleb Revision OD  3/21/2012 Loft// complex phaco/Baerveldt od 9/24/2014  OTHER EYE SURGERIES   PPV/Endo O5, CE OS (04)  CDR  0.95/0.5  Tbase  ?    Tmax  36/22 on meds      Ttarget   16/20       HVF  10-2 stim III OD (3/5/12)-sup arc,inf alt, tiny residual island OD; 24-2 OS w/ marked general depression             3/4/2013 ---HVF 10-2 stim III OD - SAD / Inf Alt los s           3 test 6060-00002017 24-2 - stim V OS  // Inf Alt loss/SAD            5 test 10-2 stim  V 2014 to 2017 - very tiny residual island of vision - essentially ext.od // gen dep - poor test     Gonio -  PAS OD - 360 // OS -  f    CCT  525/531  OCT  4 test 2011 to 2016 - dec. S/I/N od // Dec. I, Bord N  HRT  7 test 2012 to 04/2017 - MR -  Dec. S, bord I/N od // xx os /// CDR 0.79 od // xx os  Disc photos  2011, 2013, 2014, 2015  - OIS     Ttoday 12/12  Test done today -IOP, HVF DFE   2. Secondary iritis - Both Eyes      3. 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 Postinflammatory optic atrophy - Left Eye   -Sarcoid, + APD, uncertain etiology     5. Relative afferent pupillary defect OS     6. Steroid responders borderline glaucoma - Both Eyes -increased IOP w/ Pred Acetate. Does better w/ Vexol     7. Legal blindness - Both EyeS OD based on VF loss and OS based on VA.     8. Post-operative state - Both Eyes   -s/p bleb revision OD (3/21/12) had extensive subconj fibrosis, but eventually has done well.     9 Pseudophakic OD  S/P complex phaco/IOL / baerveldt od 9/24/2014   PC IOL os - done years ago - ? [facility] - ? IOL sewn in        Plan:  Uveitic glaucoma  Angle closure - PAS ou   Lost to F/U for 10 months from 9/2013 to 7/2014  - VA od went  from 20/40 to LP  Was already CF at 3 ft os- 2/2 ON pallor from sarcoid  IOP OD was 64 - off drops since 9/2013 -  "when re-presented     Pt is "legally blind" - he may want to apply for disability.    Pt previously had issues with insurance, but now has new insurance.  Discussed with patient that the vision loss od is irreversible 2/2 glaucoma and the importance of compliance with gtt/meds.     IOP continues to be improved od post tube and back on combigan and dorzolamide   PA  od - for chronic iritis - uses PA every other day  od      - may eventually need yag to PCO od  - but this will trigger a big flare up of the iritis so hold off as long as possible  The anterior chamber fibrin has resolved  - pre treat with steroid gtts and ? PO medrol dose khadra - on MTX   Pt will think about it      Pt has seen the pulmonologist  Has seen an internist   Did not see rheumatology - but they can be consulted if steroid sparing immunosuppression is needed to control the iritis // saw natalie 11/9/2012    Now on MTX for sarcoid and iritis - seems to be helping     IOP up today 14//15 --> not using dorzolamide correctly  Cont combigan  bid ou  Cont PG's (travatan ) OS only  - cont  dorzolamide OU -  twice daily ou --> educated patient to use in BOTH eyes (states he only uses OD)  - cont with  PA OD every other day OD     Complains of blurry vision OS that is worsening --? Band keratopathy? Can refer for removal w/ cornea      F/U 4 months with IOP check - other test are up to date     Pt is getting training (first step) at the [facility] - says it is going well.                 "

## 2017-08-11 DIAGNOSIS — H20.9 UVEITIC GLAUCOMA OF BOTH EYES, SEVERE STAGE: ICD-10-CM

## 2017-08-11 DIAGNOSIS — H40.43X3 UVEITIC GLAUCOMA OF BOTH EYES, SEVERE STAGE: ICD-10-CM

## 2017-08-11 DIAGNOSIS — H20.9 UVEITIC GLAUCOMA, BILATERAL, SEVERE STAGE: ICD-10-CM

## 2017-08-11 DIAGNOSIS — H40.43X3 UVEITIC GLAUCOMA, BILATERAL, SEVERE STAGE: ICD-10-CM

## 2017-08-11 DIAGNOSIS — D86.83 IRIDOCYCLITIS DUE TO SARCOIDOSIS: ICD-10-CM

## 2017-08-11 RX ORDER — BRIMONIDINE TARTRATE AND TIMOLOL MALEATE 2; 5 MG/ML; MG/ML
1 SOLUTION OPHTHALMIC 2 TIMES DAILY
Qty: 10 ML | Refills: 12 | Status: SHIPPED | OUTPATIENT
Start: 2017-08-11 | End: 2017-09-30 | Stop reason: SDUPTHER

## 2017-08-11 RX ORDER — TRAVOPROST OPHTHALMIC SOLUTION 0.04 MG/ML
1 SOLUTION OPHTHALMIC NIGHTLY
Qty: 2.5 ML | Refills: 12 | Status: SHIPPED | OUTPATIENT
Start: 2017-08-11 | End: 2017-11-20

## 2017-08-11 RX ORDER — DORZOLAMIDE HCL 20 MG/ML
1 SOLUTION/ DROPS OPHTHALMIC 2 TIMES DAILY
Qty: 10 ML | Refills: 12 | Status: SHIPPED | OUTPATIENT
Start: 2017-08-11 | End: 2017-10-17 | Stop reason: SDUPTHER

## 2017-08-11 RX ORDER — PREDNISOLONE ACETATE 10 MG/ML
1 SUSPENSION/ DROPS OPHTHALMIC EVERY OTHER DAY
Qty: 5 ML | Refills: 12 | Status: SHIPPED | OUTPATIENT
Start: 2017-08-11 | End: 2018-09-06 | Stop reason: ALTCHOICE

## 2017-09-30 DIAGNOSIS — H20.9 UVEITIC GLAUCOMA OF BOTH EYES, SEVERE STAGE: ICD-10-CM

## 2017-09-30 DIAGNOSIS — H40.43X3 UVEITIC GLAUCOMA OF BOTH EYES, SEVERE STAGE: ICD-10-CM

## 2017-10-02 RX ORDER — BRIMONIDINE TARTRATE, TIMOLOL MALEATE 2; 5 MG/ML; MG/ML
SOLUTION/ DROPS OPHTHALMIC
Qty: 10 ML | Refills: 12 | Status: SHIPPED | OUTPATIENT
Start: 2017-10-02 | End: 2018-09-10 | Stop reason: SDUPTHER

## 2017-10-17 DIAGNOSIS — H40.43X3 UVEITIC GLAUCOMA, BILATERAL, SEVERE STAGE: ICD-10-CM

## 2017-10-17 DIAGNOSIS — H20.9 UVEITIC GLAUCOMA, BILATERAL, SEVERE STAGE: ICD-10-CM

## 2017-10-17 RX ORDER — DORZOLAMIDE HCL 20 MG/ML
SOLUTION/ DROPS OPHTHALMIC
Qty: 10 ML | Refills: 3 | Status: SHIPPED | OUTPATIENT
Start: 2017-10-17 | End: 2018-04-21 | Stop reason: SDUPTHER

## 2017-11-20 ENCOUNTER — OFFICE VISIT (OUTPATIENT)
Dept: OPHTHALMOLOGY | Facility: CLINIC | Age: 57
End: 2017-11-20
Payer: MEDICARE

## 2017-11-20 DIAGNOSIS — H57.09 RELATIVE AFFERENT PUPILLARY DEFECT: ICD-10-CM

## 2017-11-20 DIAGNOSIS — H21.523 PAS (PERIPH ANTERIOR SYNECHIAE), BILATERAL: ICD-10-CM

## 2017-11-20 DIAGNOSIS — H26.491 POSTERIOR CAPSULAR OPACIFICATION VISUALLY SIGNIFICANT, RIGHT EYE: ICD-10-CM

## 2017-11-20 DIAGNOSIS — H20.9 UVEITIC GLAUCOMA, BILATERAL, SEVERE STAGE: Primary | ICD-10-CM

## 2017-11-20 DIAGNOSIS — H40.43X3 UVEITIC GLAUCOMA, BILATERAL, SEVERE STAGE: Primary | ICD-10-CM

## 2017-11-20 DIAGNOSIS — H54.8 LEGAL BLINDNESS: ICD-10-CM

## 2017-11-20 DIAGNOSIS — H18.422 BAND KERATOPATHY, LEFT: ICD-10-CM

## 2017-11-20 DIAGNOSIS — H26.491 PCO (POSTERIOR CAPSULAR OPACIFICATION), RIGHT: ICD-10-CM

## 2017-11-20 DIAGNOSIS — Z96.1 PSEUDOPHAKIA OF BOTH EYES: ICD-10-CM

## 2017-11-20 DIAGNOSIS — H40.043 STEROID RESPONDERS BORDERLINE GLAUCOMA, BILATERAL: ICD-10-CM

## 2017-11-20 DIAGNOSIS — H47.292 POSTINFLAMMATORY OPTIC ATROPHY, LEFT: ICD-10-CM

## 2017-11-20 DIAGNOSIS — D86.83 IRIDOCYCLITIS DUE TO SARCOIDOSIS: ICD-10-CM

## 2017-11-20 PROCEDURE — 99999 PR PBB SHADOW E&M-EST. PATIENT-LVL II: CPT | Mod: PBBFAC,,, | Performed by: OPHTHALMOLOGY

## 2017-11-20 PROCEDURE — 99499 UNLISTED E&M SERVICE: CPT | Mod: S$GLB,,, | Performed by: OPHTHALMOLOGY

## 2017-11-20 PROCEDURE — 92012 INTRM OPH EXAM EST PATIENT: CPT | Mod: S$GLB,,, | Performed by: OPHTHALMOLOGY

## 2017-11-20 RX ORDER — LATANOPROST 50 UG/ML
1 SOLUTION/ DROPS OPHTHALMIC NIGHTLY
Qty: 2.5 ML | Refills: 12 | Status: SHIPPED | OUTPATIENT
Start: 2017-11-20 | End: 2018-09-10 | Stop reason: SDUPTHER

## 2017-11-20 NOTE — PROGRESS NOTES
HPI     DLS: 8/07/17    Pt here for 4 month check;   Pt states OS starting burning a little bit which just started on the way   here this morning. Pt would like to know if he can change Travatan to   Latanoprost due to insurance not taking it anymore.    Meds: Combigan bid ou             Travatan qhs os             Dorzolamide bid ou             PA every other day OD     1. Band keratopathy, left  2. Uveitic glaucoma, bilateral, severe stage  3. Posterior capsular opacification visually significant, right eye  4. 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 Postinflammatory optic atrophy, left   6. PCO (posterior capsular opacification), right     Last edited by Candace Fan on 11/20/2017 10:23 AM. (History)            Assessment /Plan     For exam results, see Encounter Report.    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 Uveitic glaucoma - Both Eyes OD>OS  2/2 sarcoidosis  Old pt of Dr. Wagner and Dr. Derian Avila HVF 2012  First photos 2011     Family history   neg  Glaucoma meds   Off all drops and diamox 2013 to 2014  (pt lost insurance)(( had +++ progression off gtts)                              back on gtts combigan ou bid /  travatan os q day  / PA od every other day - 3 x week  H/O adverse rxn to glaucoma drops  None  LASERS   none  GLAUCOMA SURGERIES   Trab OD (3/20/ 2010 -[facility]), Bleb Revision OD  3/21/2012 Loft// complex phaco/Baerveldt od 9/24/2014  OTHER EYE SURGERIES   PPV/Endo O5, CE OS (04)  CDR  0.95/0.5  Tbase  ?    Tmax  36/22 on meds      Ttarget   16/20       HVF  10-2 stim III OD (3/5/12)-sup arc,inf alt, tiny residual island OD; 24-2 OS w/ marked general depression             3/4/2013 ---HVF 10-2 stim III OD - SAD / Inf Alt los s           3 test [id number] 24-2 - stim V OS  // Inf Alt loss/SAD            5 test 10-2 stim  V 2014 to 2017 - very tiny residual island of vision - essentially ext.od // gen dep - poor test     Gonio -  PAS OD - 360 // OS -  f    CCT  525/531  OCT  4 test 2011 to 2016 - dec. S/I/N od // Dec. I, Bord N  HRT  7 test 2012 to 04/2017 - MR -  Dec. S, bord I/N od // xx os /// CDR 0.79 od // xx os  Disc photos  2011, 2013, 2014, 2015  - OIS     Ttoday 12/13  Test done today -IOP   2. Secondary iritis - Both Eyes      3. 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 Postinflammatory optic atrophy - Left Eye   -Sarcoid, + APD, uncertain etiology     5. Relative afferent pupillary defect OS     6. Steroid responders borderline glaucoma - Both Eyes -increased IOP w/ Pred Acetate. Does better w/ Vexol     7. Legal blindness - Both EyeS OD based on VF loss and OS based on VA.     8. Post-operative state - Both Eyes   -s/p bleb revision OD (3/21/12) had extensive subconj fibrosis, but eventually has done well.     9 Pseudophakic OD  S/P complex phaco/IOL / baerveldt od 9/24/2014   PC IOL os - done years ago - ? [facility] - ? IOL sewn in        Plan:  Uveitic glaucoma  Angle closure - PAS ou   Lost to F/U for 10 months from 9/2013 to 7/2014  - VA od went  from 20/40 to LP  Was already CF at 3 ft os- 2/2 ON pallor from sarcoid  IOP OD was 64 - off drops since 9/2013 - when re-presented     Pt is  ""legally blind" - he may want to apply for disability.    Pt previously had issues with insurance, but now has new insurance.  Discussed with patient that the vision loss od is irreversible 2/2 glaucoma and the importance of compliance with gtt/meds.     IOP continues to be improved od post tube and back on combigan and dorzolamide   PA  od - for chronic iritis - uses PA every other day  od      - may eventually need yag to PCO od  - but this will trigger a big flare up of the iritis so hold off as long as possible  The anterior chamber fibrin has resolved  - pre treat with steroid gtts and ? PO medrol dose khadra - on MTX   Pt will think about it      Pt has seen the pulmonologist  Has seen an internist   Did not see rheumatology - but they can be consulted if steroid sparing immunosuppression is needed to control the iritis // saw natalie 11/9/2012    Now on MTX for sarcoid and iritis - seems to be helping     Cont combigan  bid ou  Cont PG's (travatan ) OS only // switch to latanoprost os q hs - for insurance reasons   - cont  dorzolamide OU -  twice daily ou --> educated patient to use in BOTH eyes (states he only uses OD)  - cont with  PA OD every other day OD     Complains of blurry vision OS that is worsening --? Band keratopathy? Can refer for removal w/ cornea      F/U 4 months with IOP check -  // glaucoma check // iritis check // DFE // disc photos     Pt is getting training (first step) at the [facility] - says it is going well.               "

## 2018-02-15 RX ORDER — AMLODIPINE BESYLATE 10 MG/1
10 TABLET ORAL DAILY
Qty: 30 TABLET | Refills: 0 | Status: SHIPPED | OUTPATIENT
Start: 2018-02-15 | End: 2018-03-21 | Stop reason: SDUPTHER

## 2018-02-15 RX ORDER — PRAVASTATIN SODIUM 40 MG/1
40 TABLET ORAL DAILY
Qty: 30 TABLET | Refills: 0 | Status: SHIPPED | OUTPATIENT
Start: 2018-02-15 | End: 2018-03-21 | Stop reason: SDUPTHER

## 2018-03-22 RX ORDER — AMLODIPINE BESYLATE 10 MG/1
TABLET ORAL
Qty: 30 TABLET | Refills: 0 | Status: SHIPPED | OUTPATIENT
Start: 2018-03-22 | End: 2018-04-26 | Stop reason: SDUPTHER

## 2018-03-22 RX ORDER — PRAVASTATIN SODIUM 40 MG/1
TABLET ORAL
Qty: 30 TABLET | Refills: 0 | Status: SHIPPED | OUTPATIENT
Start: 2018-03-22 | End: 2018-04-26 | Stop reason: SDUPTHER

## 2018-03-23 ENCOUNTER — TELEPHONE (OUTPATIENT)
Dept: INTERNAL MEDICINE | Facility: CLINIC | Age: 58
End: 2018-03-23

## 2018-03-23 NOTE — TELEPHONE ENCOUNTER
----- Message from Milo Rodrigues sent at 3/23/2018  9:16 AM CDT -----  Contact: self [phone]  Patient is returning a phone call.  Who left a message for the patient:not sure   Does patient know what this is regarding:  Pt says 29th willn't work.. 04/02 will work after 8:45.  Comments:

## 2018-04-02 ENCOUNTER — OFFICE VISIT (OUTPATIENT)
Dept: INTERNAL MEDICINE | Facility: CLINIC | Age: 58
End: 2018-04-02
Payer: MEDICARE

## 2018-04-02 ENCOUNTER — OFFICE VISIT (OUTPATIENT)
Dept: OPHTHALMOLOGY | Facility: CLINIC | Age: 58
End: 2018-04-02
Payer: MEDICARE

## 2018-04-02 VITALS
HEIGHT: 69 IN | SYSTOLIC BLOOD PRESSURE: 114 MMHG | BODY MASS INDEX: 27.73 KG/M2 | DIASTOLIC BLOOD PRESSURE: 72 MMHG | WEIGHT: 187.19 LBS | HEART RATE: 63 BPM

## 2018-04-02 DIAGNOSIS — H26.491 PCO (POSTERIOR CAPSULAR OPACIFICATION), RIGHT: ICD-10-CM

## 2018-04-02 DIAGNOSIS — Z96.1 PSEUDOPHAKIA OF BOTH EYES: ICD-10-CM

## 2018-04-02 DIAGNOSIS — D86.83 IRIDOCYCLITIS DUE TO SARCOIDOSIS: ICD-10-CM

## 2018-04-02 DIAGNOSIS — H54.8 LEGAL BLINDNESS: ICD-10-CM

## 2018-04-02 DIAGNOSIS — H40.43X3 UVEITIC GLAUCOMA, BILATERAL, SEVERE STAGE: Primary | ICD-10-CM

## 2018-04-02 DIAGNOSIS — E78.5 HYPERLIPIDEMIA, UNSPECIFIED HYPERLIPIDEMIA TYPE: Primary | ICD-10-CM

## 2018-04-02 DIAGNOSIS — D86.0 SARCOIDOSIS OF LUNG: ICD-10-CM

## 2018-04-02 DIAGNOSIS — H20.9 UVEITIC GLAUCOMA, BILATERAL, SEVERE STAGE: Primary | ICD-10-CM

## 2018-04-02 DIAGNOSIS — Z96.89 HISTORY OF GLAUCOMA TUBE SHUNT PROCEDURE: ICD-10-CM

## 2018-04-02 DIAGNOSIS — H21.523 PAS (PERIPH ANTERIOR SYNECHIAE), BILATERAL: ICD-10-CM

## 2018-04-02 DIAGNOSIS — H57.09 RELATIVE AFFERENT PUPILLARY DEFECT: ICD-10-CM

## 2018-04-02 DIAGNOSIS — H47.292 POSTINFLAMMATORY OPTIC ATROPHY, LEFT: ICD-10-CM

## 2018-04-02 DIAGNOSIS — H18.422 BAND KERATOPATHY, LEFT: ICD-10-CM

## 2018-04-02 DIAGNOSIS — H26.491 POSTERIOR CAPSULAR OPACIFICATION VISUALLY SIGNIFICANT, RIGHT EYE: ICD-10-CM

## 2018-04-02 DIAGNOSIS — E55.9 VITAMIN D DEFICIENCY DISEASE: ICD-10-CM

## 2018-04-02 DIAGNOSIS — I10 ESSENTIAL HYPERTENSION: ICD-10-CM

## 2018-04-02 DIAGNOSIS — H40.043 STEROID RESPONDERS BORDERLINE GLAUCOMA, BILATERAL: ICD-10-CM

## 2018-04-02 PROCEDURE — 99999 PR PBB SHADOW E&M-EST. PATIENT-LVL II: CPT | Mod: PBBFAC,,, | Performed by: OPHTHALMOLOGY

## 2018-04-02 PROCEDURE — 3074F SYST BP LT 130 MM HG: CPT | Mod: CPTII,S$GLB,, | Performed by: INTERNAL MEDICINE

## 2018-04-02 PROCEDURE — 99499 UNLISTED E&M SERVICE: CPT | Mod: S$GLB,,, | Performed by: OPHTHALMOLOGY

## 2018-04-02 PROCEDURE — 3078F DIAST BP <80 MM HG: CPT | Mod: CPTII,S$GLB,, | Performed by: INTERNAL MEDICINE

## 2018-04-02 PROCEDURE — 99999 PR PBB SHADOW E&M-EST. PATIENT-LVL IV: CPT | Mod: PBBFAC,,, | Performed by: INTERNAL MEDICINE

## 2018-04-02 PROCEDURE — 99214 OFFICE O/P EST MOD 30 MIN: CPT | Mod: S$GLB,,, | Performed by: INTERNAL MEDICINE

## 2018-04-02 PROCEDURE — 92014 COMPRE OPH EXAM EST PT 1/>: CPT | Mod: S$GLB,,, | Performed by: OPHTHALMOLOGY

## 2018-04-02 PROCEDURE — 92250 FUNDUS PHOTOGRAPHY W/I&R: CPT | Mod: S$GLB,,, | Performed by: OPHTHALMOLOGY

## 2018-04-02 NOTE — PROGRESS NOTES
HPI     DLS: 11/20/17    Pt here for 4 month check;    Meds: Combigan bid ou             Latanoprost qhs os              Dorzolamide bid ou             PA every other day OD    1. Band keratopathy, left   2. Uveitic glaucoma, bilateral, severe stage   3. Posterior capsular opacification visually significant, right eye   4. 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 Postinflammatory optic atrophy, left   6. PCO (posterior capsular opacification), right        Last edited by Candace Fan on 4/2/2018  8:49 AM. (History)            Assessment /Plan     For exam results, see Encounter Report.    Uveitic glaucoma, bilateral, severe stage  -     Color Fundus Photography - OU - Both Eyes  -     Posterior Segment OCT Optic Nerve- Both eyes; Future  -     Burger Visual Field - OU - Extended - Both Eyes; Future    Band keratopathy, left    Posterior capsular opacification visually significant, right eye    Iridocyclitis due to sarcoidosis - Both Eyes    PCO (posterior capsular opacification), right    Postinflammatory optic atrophy, left    Steroid responders borderline glaucoma, bilateral    Relative afferent pupillary defect - Left Eye    Pas (periph anterior synechiae), bilateral    Legal blindness - Both Eyes    Pseudophakia of both eyes    History of glaucoma tube shunt procedure        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 Uveitic glaucoma - Both Eyes OD>OS  2/2 sarcoidosis  Old pt of Dr. Wagner and Dr. Derian Avila F 2012  First photos 2011     Family history   neg  Glaucoma meds   Off all drops and diamox 2013 to 2014  (pt lost insurance)(( had +++ progression off gtts)                              back  on gtts combigan ou bid / travatan os q day  / PA od every other day - 3 x week  H/O adverse rxn to glaucoma drops  None  LASERS   none  GLAUCOMA SURGERIES   Trab OD (3/20/ 2010 -[facility]), Bleb Revision OD  3/21/2012 Loft// complex phaco/Baerveldt od 9/24/2014  OTHER EYE SURGERIES   PPV/Endo O5, CE OS (04)  CDR  0.95/0.5  Tbase  ?    Tmax  36/22 on meds      Ttarget   16/20       HVF  10-2 stim III OD (3/5/12)-sup arc,inf alt, tiny residual island OD; 24-2 OS w/ marked general depression             3/4/2013 ---HVF 10-2 stim III OD - SAD / Inf Alt los s           3 test [id number]  24-2 - stim V OS  // Inf Alt loss/SAD            5 test 10-2 stim  V 2014 to 2017 - very tiny residual island of vision - essentially ext.od // gen dep - poor test     Gonio -  PAS OD - 360 // OS -  f    CCT  525/531  OCT  4 test 2011 to 2016 - dec. S/I/N od // Dec. I, Bord N  HRT  7 test 2012 to 04/2017 - MR -  Dec. S, bord I/N od // xx os /// CDR 0.79 od // xx os  Disc photos  2011, 2013, 2014, 2015, 2018   - OIS     Ttoday 16/16  Test done today -IOP / DFE / photos      2. Secondary iritis - Both Eyes      3. 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 Postinflammatory optic atrophy - Left Eye   -Sarcoid, + APD, uncertain etiology     5. Relative afferent pupillary defect OS     6. Steroid responders borderline glaucoma - Both Eyes -increased IOP w/ Pred Acetate. Does better w/ Vexol     7. Legal blindness - Both EyeS OD based on VF loss and OS based on VA.     8. Post-operative state - Both Eyes   -s/p bleb revision OD (3/21/12) had extensive subconj fibrosis, but eventually has done well.     9 Pseudophakic OD  S/P complex phaco/IOL / baerveldt od 9/24/2014   PC IOL os - done years ago - ? [place] - ? IOL sewn in        Plan:  Uveitic glaucoma  Angle closure - PAS ou   Lost to F/U for 10 months from 9/2013 to 7/2014  - VA od went  from 20/40 to LP  Was already CF at 3 ft os- 2/2 ON pallor from sarcoid  IOP OD was 64 - off  "drops since 9/2013 - when re-presented     Pt is "legally blind" - he may want to apply for disability.    Pt previously had issues with insurance, but now has new insurance.  Discussed with patient that the vision loss od is irreversible 2/2 glaucoma and the importance of compliance with gtt/meds.     IOP continues to be improved od post tube and back on combigan and dorzolamide   PA  od - for chronic iritis - uses PA every other day  od      - may eventually need yag to PCO od  - but this will likely trigger a big flare up of the iritis so hold off as long as possible  The anterior chamber fibrin has resolved  - pre treat with steroid gtts and ? PO medrol dose khadra - on MTX   Pt will think about it      Pt has seen the pulmonologist  Has seen an internist   Did not see rheumatology - but they can be consulted if steroid sparing immunosuppression is needed to control the iritis // saw natalie 11/9/2012    Now on MTX for sarcoid and iritis - seems to be helping     Cont combigan  bid ou  Cont PG's (travatan ) OS only // switch to latanoprost os q hs - for insurance reasons   - cont  dorzolamide OU -  twice daily ou --> educated patient to use in BOTH eyes (states he only uses OD)  - cont with  PA OD every other day OD     Complains of blurry vision OS that is worsening --? Band keratopathy? Can refer for removal w/ cornea   - seen by cristine 5/2/2017 - not felt to be VS      F/U 4 months with IOP check - // HVF 10-2 stim V od and 24-2 stim V os // and OCT // iritis check     Pt is getting training (first step) at the [facility] - says it is going well.    ADDENDUM - 5/20/2018  NOTE FROM RHEUMATOLOGIST - HE HAS STOPPED MTX ON HIS OWN.    Wonders if he needs to re-start it    Will monitor off for now and ask her to re-start it if the iritis flares up     "

## 2018-04-02 NOTE — PROGRESS NOTES
Subjective:       Patient ID: Jaguar Lackey is a 58 y.o. male.    Chief Complaint: Follow-up    Mr. Lackey is a 58 year old male with a past medical history significant for sarcoidosis and hypertension. The patient has no current complaints at this time. He was recently seen by ophthalmology for is ongoing uveitis and glaucoma. The patient states that his vision has remained stable since his last office visit and that he has been working with a program to help people with vision loss. The patient states that he has been working out more recently at a gym across from his program and states he has no difficulty with breathing/SOB.         Review of Systems   Constitutional: Negative for activity change, appetite change, fatigue and fever.   Eyes: Positive for visual disturbance. Negative for photophobia and redness.   Respiratory: Negative for cough, chest tightness, shortness of breath and wheezing.    Cardiovascular: Negative for chest pain and palpitations.   Gastrointestinal: Negative for abdominal distention, abdominal pain, constipation, diarrhea, nausea and vomiting.   Genitourinary: Negative for dysuria and hematuria.   Musculoskeletal: Negative for arthralgias and myalgias.   Skin: Negative for rash and wound.   Neurological: Negative for dizziness, syncope, weakness, light-headedness, numbness and headaches.       Objective:      Physical Exam   Constitutional: He appears well-developed and well-nourished. No distress.   HENT:   Head: Normocephalic and atraumatic.   Right Ear: External ear normal.   Left Ear: External ear normal.   Eyes: Right eye exhibits no discharge. Left eye exhibits no discharge. No scleral icterus.   Decreased visual acuity bilaterally    Neck: No JVD present.   Cardiovascular: Normal rate and regular rhythm.    Pulmonary/Chest: Effort normal and breath sounds normal. No respiratory distress. He has no wheezes. He has no rales.   Abdominal: Soft. Bowel sounds are normal. He exhibits  no distension and no mass. There is no tenderness. There is no guarding.   Musculoskeletal: He exhibits no tenderness or deformity.   Neurological: He is alert.   Skin: Skin is warm and dry. He is not diaphoretic.   Psychiatric: He has a normal mood and affect. His behavior is normal. Thought content normal.   Vitals reviewed.      Assessment:       1. Hyperlipidemia, unspecified hyperlipidemia type    2. Essential hypertension    3. Legal blindness - Both Eyes    4. Sarcoidosis of lung    5. Vitamin D deficiency disease        Plan:       1. Hyperlipidemia. Stable, will repeat lipid panel today. Continue pravastatin 40MG.  2. Essential hypertension. Blood pressure improved on this office visit. Continue amlodipine 10MG.  3. Legal blindness. Managing well, continue to work with vision loss program.  4. Sarcoidosis. Will need follow up rheumatology appointment.  5. Vitamin D deficiency. Will check vitamin D levels today.

## 2018-04-02 NOTE — PROGRESS NOTES
I have personally taken the history and examined this patient and agree with the resident's note as stated above with the following thoughts:        1. Hypertension. BP is better controled- continue current  Mends.    2. He has hyperlipidemia. He is on pravastatin 40 mg a day. I      3. He has vitamin D deficiency with history of sarcoidosis. We put him on   vitamin D. He is on a 2 x monthly vitamin D. Will follow up on VIt D level    4. sarcoidosis. He has not seen Rheum missed visit.  Off MXT- will make a follow up for him     Ophthalmology .for glaucoma and vision loss.

## 2018-04-03 ENCOUNTER — PATIENT MESSAGE (OUTPATIENT)
Dept: RHEUMATOLOGY | Facility: CLINIC | Age: 58
End: 2018-04-03

## 2018-04-05 ENCOUNTER — TELEPHONE (OUTPATIENT)
Dept: INTERNAL MEDICINE | Facility: CLINIC | Age: 58
End: 2018-04-05

## 2018-04-05 RX ORDER — ERGOCALCIFEROL 1.25 MG/1
50000 CAPSULE ORAL
Qty: 12 CAPSULE | Refills: 8 | Status: SHIPPED | OUTPATIENT
Start: 2018-04-05 | End: 2018-04-05 | Stop reason: SDUPTHER

## 2018-04-05 RX ORDER — ERGOCALCIFEROL 1.25 MG/1
50000 CAPSULE ORAL
Qty: 12 CAPSULE | Refills: 8 | Status: SHIPPED | OUTPATIENT
Start: 2018-04-05 | End: 2018-08-02 | Stop reason: SDUPTHER

## 2018-04-05 NOTE — TELEPHONE ENCOUNTER
Please ask him to restart vitamin D that I will send to his pharmacy-- he needs to take 1 every 2 weeks.

## 2018-04-21 DIAGNOSIS — H40.43X3 UVEITIC GLAUCOMA, BILATERAL, SEVERE STAGE: ICD-10-CM

## 2018-04-21 DIAGNOSIS — H20.9 UVEITIC GLAUCOMA, BILATERAL, SEVERE STAGE: ICD-10-CM

## 2018-04-23 RX ORDER — DORZOLAMIDE HCL 20 MG/ML
SOLUTION/ DROPS OPHTHALMIC
Qty: 10 ML | Refills: 10 | Status: SHIPPED | OUTPATIENT
Start: 2018-04-23 | End: 2018-04-24 | Stop reason: RX

## 2018-04-24 DIAGNOSIS — H20.9 UVEITIC GLAUCOMA OF BOTH EYES, UNSPECIFIED GLAUCOMA STAGE: Primary | ICD-10-CM

## 2018-04-24 DIAGNOSIS — H40.43X0 UVEITIC GLAUCOMA OF BOTH EYES, UNSPECIFIED GLAUCOMA STAGE: Primary | ICD-10-CM

## 2018-04-24 RX ORDER — BRINZOLAMIDE 10 MG/ML
1 SUSPENSION/ DROPS OPHTHALMIC 2 TIMES DAILY
Qty: 10 ML | Refills: 12 | Status: SHIPPED | OUTPATIENT
Start: 2018-04-24 | End: 2018-09-10 | Stop reason: SDUPTHER

## 2018-04-24 RX ORDER — BRINZOLAMIDE 10 MG/ML
1 SUSPENSION/ DROPS OPHTHALMIC 2 TIMES DAILY
COMMUNITY
End: 2018-04-24 | Stop reason: SDUPTHER

## 2018-04-26 RX ORDER — PRAVASTATIN SODIUM 40 MG/1
TABLET ORAL
Qty: 90 TABLET | Refills: 3 | Status: SHIPPED | OUTPATIENT
Start: 2018-04-26 | End: 2018-05-16

## 2018-04-26 RX ORDER — AMLODIPINE BESYLATE 10 MG/1
TABLET ORAL
Qty: 90 TABLET | Refills: 3 | Status: SHIPPED | OUTPATIENT
Start: 2018-04-26 | End: 2019-04-22 | Stop reason: SDUPTHER

## 2018-05-16 ENCOUNTER — OFFICE VISIT (OUTPATIENT)
Dept: RHEUMATOLOGY | Facility: CLINIC | Age: 58
End: 2018-05-16
Payer: MEDICARE

## 2018-05-16 VITALS
RESPIRATION RATE: 18 BRPM | WEIGHT: 186.38 LBS | SYSTOLIC BLOOD PRESSURE: 121 MMHG | BODY MASS INDEX: 27.53 KG/M2 | HEART RATE: 62 BPM | DIASTOLIC BLOOD PRESSURE: 75 MMHG

## 2018-05-16 DIAGNOSIS — D86.9 SARCOIDOSIS: Primary | ICD-10-CM

## 2018-05-16 DIAGNOSIS — D86.83 IRIDOCYCLITIS DUE TO SARCOIDOSIS: ICD-10-CM

## 2018-05-16 DIAGNOSIS — D86.0 SARCOIDOSIS OF LUNG: ICD-10-CM

## 2018-05-16 PROCEDURE — 3078F DIAST BP <80 MM HG: CPT | Mod: CPTII,S$GLB,, | Performed by: INTERNAL MEDICINE

## 2018-05-16 PROCEDURE — 99999 PR PBB SHADOW E&M-EST. PATIENT-LVL III: CPT | Mod: PBBFAC,,, | Performed by: INTERNAL MEDICINE

## 2018-05-16 PROCEDURE — 3008F BODY MASS INDEX DOCD: CPT | Mod: CPTII,S$GLB,, | Performed by: INTERNAL MEDICINE

## 2018-05-16 PROCEDURE — 99214 OFFICE O/P EST MOD 30 MIN: CPT | Mod: S$GLB,,, | Performed by: INTERNAL MEDICINE

## 2018-05-16 PROCEDURE — 99499 UNLISTED E&M SERVICE: CPT | Mod: S$GLB,,, | Performed by: INTERNAL MEDICINE

## 2018-05-16 PROCEDURE — 3074F SYST BP LT 130 MM HG: CPT | Mod: CPTII,S$GLB,, | Performed by: INTERNAL MEDICINE

## 2018-05-16 NOTE — Clinical Note
Mr. Lackey stopped his methotrexate.  Do you think that there is inflammation in the eyes that would benefit from restarting methotrexate?

## 2018-05-16 NOTE — LETTER
May 16, 2018      Carloz Desir Jr., MD  [address]  [place] [address]           [facility] - Rheumatology  [address]  [place] [address]  Phone: [phone]  Fax: [phone]          Patient: Jaguar Lackey   MR Number: [id number]   YOB: 1960   Date of Visit: 5/16/2018       Dear Dr. Carloz Desir Jr.:    Thank you for referring Jaguar Lackey to me for evaluation. Attached you will find relevant portions of my assessment and plan of care.    If you have questions, please do not hesitate to call me. I look forward to following Jaguar Lackey along with you.    Sincerely,    Chhaya Desouza MD    Enclosure  CC:  No Recipients    If you would like to receive this communication electronically, please contact [email or web address] or [phone] to request more information on [employer] Link access.    For providers and/or their staff who would like to refer a patient to [facility], please contact us through our one-stop-shop provider referral line, [facility], at [phone].    If you feel you have received this communication in error or would no longer like to receive these types of communications, please e-mail [email or web address]

## 2018-05-16 NOTE — PROGRESS NOTES
Subjective:       Patient ID: Jaguar Lackey is a 58 y.o. male.    Chief Complaint: Disease Management      HPI:  Jaguar Lackey is a 58 y.o. 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    He was started on MTX as steroid sparing agent and is tolerating it well.  Currently off 4 tabs MTX and folic acid 1 mg daily      Patient saw ophthamologist in April 2018 and there was no inflammation.  Has not seen pulmonary in sometime.    Went out of town for 9 months for blind skill training.  He stopped methotrexate since he would not be able to get labs done.        Review of Systems   Constitutional: Negative for fatigue, fever and unexpected weight change.   Eyes: Negative.    Respiratory: Positive for shortness of breath (with working out; recovers after stopping but takes long in his opinion).    Cardiovascular: Negative.    Gastrointestinal: Negative.    Endocrine: Negative.    Genitourinary: Negative.    Musculoskeletal: Negative.    Allergic/Immunologic: Negative.    Neurological: Negative.    Hematological: Negative.    Psychiatric/Behavioral: Negative.          Objective:   /75   Pulse 62   Resp 18   Wt 84.6 kg (186 lb 6.4 oz)   BMI 27.53 kg/m²      Physical Exam   Constitutional: He is oriented to person, place, and time and well-developed, well-nourished, and in no distress.   HENT:   Head: Normocephalic and atraumatic.   Eyes: Conjunctivae and EOM are normal.   Neck: Neck supple.   Cardiovascular: Normal rate, regular rhythm and normal heart sounds.    Pulmonary/Chest: Effort normal and breath sounds normal.   Abdominal: Soft. Bowel sounds are normal.   Neurological: He is alert and oriented to person, place, and time. Gait normal.   Skin: Skin is warm and dry.     Psychiatric: Mood and affect normal.   Musculoskeletal:   5/5 UE and LE bilaterally proximally and distally            LABS    Component       Latest Ref Rng & Units 4/2/2018 5/17/2017   WBC      3.90 - 12.70 K/uL  6.92   RBC      4.60 - 6.20 M/uL  5.26   Hemoglobin      14.0 - 18.0 g/dL  15.0   Hematocrit      40.0 - 54.0 %  43.4   MCV      82 - 98 fL  83   MCH      27.0 - 31.0 pg  28.5   MCHC      32.0 - 36.0 %  34.6   RDW      11.5 - 14.5 %  14.5   Platelets      150 - 350 K/uL  308   MPV      9.2 - 12.9 fL  9.5   Gran # (ANC)      1.8 - 7.7 K/uL  3.0   Lymph #      1.0 - 4.8 K/uL  3.1   Mono #      0.3 - 1.0 K/uL  0.7   Eos #      0.0 - 0.5 K/uL  0.2   Baso #      0.00 - 0.20 K/uL  0.04   Gran%      38.0 - 73.0 %  42.5   Lymph%      18.0 - 48.0 %  44.4   Mono%      4.0 - 15.0 %  10.0   Eosinophil%      0.0 - 8.0 %  2.2   Basophil%      0.0 - 1.9 %  0.6   Differential Method        Automated   Sodium      136 - 145 mmol/L 142 142   Potassium      3.5 - 5.1 mmol/L 4.0 4.0   Chloride      95 - 110 mmol/L 108 108   CO2      23 - 29 mmol/L 26 26   Glucose      70 - 110 mg/dL 104 102   BUN, Bld      6 - 20 mg/dL 19 15   Creatinine      0.5 - 1.4 mg/dL 0.8 0.9   Calcium      8.7 - 10.5 mg/dL 9.9 9.2   Total Protein      6.0 - 8.4 g/dL 7.8 7.5   Albumin      3.5 - 5.2 g/dL 4.2 3.8   Total Bilirubin      0.1 - 1.0 mg/dL 0.5 0.5   Alkaline Phosphatase      55 - 135 U/L 70 74   AST      10 - 40 U/L 22 22   ALT      10 - 44 U/L 42 38   Anion Gap      8 - 16 mmol/L 8 8   eGFR if African American      >60 mL/min/1.73 m:2 >60 >60.0   eGFR if non African American      >60 mL/min/1.73 m:2 >60 >60.0   Cholesterol      120 - 199 mg/dL 180    Triglycerides      30 - 150 mg/dL 90    HDL      40 - 75 mg/dL 47    LDL Cholesterol      63.0 - 159.0 mg/dL 115.0    HDL/Chol Ratio      20.0 - 50.0 % 26.1    Total Cholesterol/HDL Ratio      2.0 - 5.0 3.8    Non-HDL Cholesterol      mg/dL 133    CRP      0.0 - 8.2 mg/L  3.8   Sed Rate      0 - 10 mm/Hr  3   Vit D, 25-Hydroxy      30 - 96 ng/mL 18 (L)      Assessment:       1. Sarcoidosis involving the lungs and the eyes. Most  prominent is eye involvement.   No skin or joint involvement at this time. Possible skin involvement in the past.   No off MTX as immunosuppression to help with eye involvement. Humira denied by insurance.   2. HTN. On medication   3. History of bilateral knee injections in the past.  4. Vitamin D deficiency. Managed by primary doctor.   5. Left shoulder pain. Improved some.  6. SOB with exercise.   Plan:       1. Check labs if restart MTX and CXR if SOB persists  2. Consider restarting MTX if no evidence of lung abnormality and Dr. Pratt eye specialist felt it was necessary.  3. Humira denied by insurance.   4. Continue exercise     RTO 3 months/prn

## 2018-05-22 RX ORDER — DORZOLAMIDE HCL 20 MG/ML
1 SOLUTION/ DROPS OPHTHALMIC 2 TIMES DAILY
Qty: 10 ML | Refills: 12 | Status: SHIPPED | OUTPATIENT
Start: 2018-05-22 | End: 2018-07-30 | Stop reason: RX

## 2018-05-28 ENCOUNTER — TELEPHONE (OUTPATIENT)
Dept: RHEUMATOLOGY | Facility: CLINIC | Age: 58
End: 2018-05-28

## 2018-05-28 NOTE — TELEPHONE ENCOUNTER
----- Message from Honey Pratt MD sent at 5/20/2018  3:12 PM CDT -----  I last saw him 4/2/2018 and he was doing ok as far as inflammation in the eye. Lets just leave him off it and see what happens. Thanks for letting me know.  ----- Message -----  From: Chhaya Desouza MD  Sent: 5/16/2018  10:37 AM  To: Honey Pratt MD    Mr. Laceky stopped his methotrexate.  Do you think that there is inflammation in the eyes that would benefit from restarting methotrexate?

## 2018-07-30 ENCOUNTER — OFFICE VISIT (OUTPATIENT)
Dept: OPHTHALMOLOGY | Facility: CLINIC | Age: 58
End: 2018-07-30
Payer: MEDICARE

## 2018-07-30 ENCOUNTER — CLINICAL SUPPORT (OUTPATIENT)
Dept: OPHTHALMOLOGY | Facility: CLINIC | Age: 58
End: 2018-07-30
Payer: MEDICARE

## 2018-07-30 DIAGNOSIS — H47.292 POSTINFLAMMATORY OPTIC ATROPHY, LEFT: ICD-10-CM

## 2018-07-30 DIAGNOSIS — H26.491 POSTERIOR CAPSULAR OPACIFICATION VISUALLY SIGNIFICANT, RIGHT EYE: ICD-10-CM

## 2018-07-30 DIAGNOSIS — D86.83 IRIDOCYCLITIS DUE TO SARCOIDOSIS: ICD-10-CM

## 2018-07-30 DIAGNOSIS — Z96.1 PSEUDOPHAKIA OF BOTH EYES: ICD-10-CM

## 2018-07-30 DIAGNOSIS — H20.9 UVEITIC GLAUCOMA, BILATERAL, SEVERE STAGE: ICD-10-CM

## 2018-07-30 DIAGNOSIS — H57.09 RELATIVE AFFERENT PUPILLARY DEFECT: ICD-10-CM

## 2018-07-30 DIAGNOSIS — H26.491 PCO (POSTERIOR CAPSULAR OPACIFICATION), RIGHT: ICD-10-CM

## 2018-07-30 DIAGNOSIS — H18.422 BAND KERATOPATHY, LEFT: Primary | ICD-10-CM

## 2018-07-30 DIAGNOSIS — H40.043 STEROID RESPONDERS BORDERLINE GLAUCOMA, BILATERAL: ICD-10-CM

## 2018-07-30 DIAGNOSIS — H40.43X3 UVEITIC GLAUCOMA, BILATERAL, SEVERE STAGE: ICD-10-CM

## 2018-07-30 DIAGNOSIS — H21.523 PAS (PERIPH ANTERIOR SYNECHIAE), BILATERAL: ICD-10-CM

## 2018-07-30 DIAGNOSIS — H54.8 LEGAL BLINDNESS: ICD-10-CM

## 2018-07-30 PROCEDURE — 92083 EXTENDED VISUAL FIELD XM: CPT | Mod: S$GLB,,, | Performed by: OPHTHALMOLOGY

## 2018-07-30 PROCEDURE — 92014 COMPRE OPH EXAM EST PT 1/>: CPT | Mod: S$GLB,,, | Performed by: OPHTHALMOLOGY

## 2018-07-30 PROCEDURE — 92133 CPTRZD OPH DX IMG PST SGM ON: CPT | Mod: S$GLB,,, | Performed by: OPHTHALMOLOGY

## 2018-07-30 PROCEDURE — 99999 PR PBB SHADOW E&M-EST. PATIENT-LVL II: CPT | Mod: PBBFAC,,, | Performed by: OPHTHALMOLOGY

## 2018-07-30 PROCEDURE — 99499 UNLISTED E&M SERVICE: CPT | Mod: S$GLB,,, | Performed by: OPHTHALMOLOGY

## 2018-07-30 NOTE — PROGRESS NOTES
Assessment /Plan     For exam results, see Encounter Report.    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 Uveitic glaucoma - Both Eyes OD>OS  2/2 sarcoidosis  Old pt of Dr. Wagner and Dr. Menard  First HVF 2012  First photos 2011     Family history   neg  Glaucoma meds   Off all drops and diamox 2013 to 2014  (pt lost insurance)(( had +++ progression off gtts)                              back on gtts combigan ou bid / travatan os q day  / PA od every other day - 3 x week  H/O adverse rxn to glaucoma drops  None  LASERS   none  GLAUCOMA SURGERIES   Trab OD (3/20/ 2010 -Derian), Bleb Revision OD  3/21/2012 Loft// complex phaco/Baerveldt od 9/24/2014  OTHER EYE SURGERIES   PPV/Endo O5, CE OS (04)  CDR  0.95/0.5  Tbase  ?    Tmax  36/22 on meds      Ttarget   16/20       HVF  10-2 stim III OD (3/5/12)-sup arc,inf alt, tiny residual island OD; 24-2 OS w/ marked general depression             3/4/2013 ---HVF 10-2 stim III OD - SAD / Inf Alt los s            5 test [id number]  24-2 - stim V OS  // Inf Alt loss/SAD            5 test 10-2 stim  V 2014 to 2018 - very tiny residual  "island of vision - essentially ext.od // gen dep - poor test     Gonio -  PAS OD - 360 // OS -  f    CCT  525/531  OCT  5 test 2011 to 2018  - dec. S/I/N od // Dec S/T/I   HRT  7 test 2012 to 04/2017 - MR -  Dec. Sna I/N od // xx os /// CDR 0.79 od // xx os  Disc photos  2011, 2013, 2014, 2015, 2018   - OIS     Ttoday 23/16  Test done today -IOP / HVF - 10-2 stim V od and 24-2 stim V os // OCT      2. Secondary iritis - Both Eyes      3. 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 Postinflammatory optic atrophy - Left Eye   -Sarcoid, + APD, uncertain etiology     5. Relative afferent pupillary defect OS     6. Steroid responders borderline glaucoma - Both Eyes -increased IOP w/ Pred Acetate. Does better w/ Vexol     7. Legal blindness - Both EyeS OD based on VF loss and OS based on [facility].     8. Post-operative state - Both Eyes   -s/p bleb revision OD (3/21/12) had extensive subconj fibrosis, but eventually has done well.     9 Pseudophakic OD  S/P complex phaco/IOL / baerveldt od 9/24/2014   PC IOL os - done years ago - ? [place] - ? IOL sewn in        Plan:  Uveitic glaucoma  Angle closure - PAS ou   Lost to F/U for 10 months from 9/2013 to 7/2014  - VA od went  from 20/40 to LP  Was already CF at 3 ft os- 2/2 ON pallor from sarcoid  IOP OD was 64 - off drops since 9/2013 - when re-presented     Pt is "legally blind" - he may want to apply for disability.    Pt previously had issues with insurance, but now has new insurance.  Discussed with patient that the vision loss od is irreversible 2/2 glaucoma and the importance of compliance with gtt/meds.     IOP - creeping up od (7/30/2018 ) - usually 10-16 - up to 23 today (? steorid effect)   IOP continues to be improved od post tube and back on combigan and dorzolamide   PA  od - for chronic iritis - uses PA every other day  od - try and taper off - IOP creeping up od (7/30/2018 )     - may eventually need yag to PCO od  - but this will likely trigger " a big flare up of the iritis so hold off as long as possible  The anterior chamber fibrin has resolved  - pre treat with steroid gtts and ? PO medrol dose khadra - on MTX   Pt will think about it      Pt has seen the pulmonologist  Has seen an internist   Did not see rheumatology - but they can be consulted if steroid sparing immunosuppression is needed to control the iritis // saw cececlaudia 11/9/2012    Now on MTX for sarcoid and iritis - off - stopped on his own - seems to be doing ok   No increase in inflammationn od on 7/30/2018     Cont combigan  bid ou  Cont PG's (travatan ) OS only // switch to latanoprost os q hs - for insurance reasons   - cont  dorzolamide OU -  twice daily ou --> educated patient to use in BOTH eyes (states he only uses OD)  - cont with  PA OD every other day OD - try tapering off - IOP up to 23 on 7/30/2018     Complains of blurry vision OS that is worsening --? Band keratopathy? Can refer for removal w/ cornea   - seen by cristine 5/2/2017 - not felt to be VS     Loose sutue removed - cornea - OD 7/30/2018 - vigamox qid x 1 week - bottle given      F/U 4 weeks /  iritis check / IOP check off pred acetate od 3 x week and with increase rosalie zopt to tid    Pt is getting training (first step) at the [facility] - says it is going well.    ADDENDUM - 5/20/2018  NOTE FROM RHEUMATOLOGIST - HE HAS STOPPED MTX ON HIS OWN.   Wonders if he needs to re-start it    Will monitor off for now and ask her to re-start it if the iritis flares up

## 2018-08-02 ENCOUNTER — OFFICE VISIT (OUTPATIENT)
Dept: INTERNAL MEDICINE | Facility: CLINIC | Age: 58
End: 2018-08-02
Payer: MEDICARE

## 2018-08-02 ENCOUNTER — HOSPITAL ENCOUNTER (OUTPATIENT)
Dept: RADIOLOGY | Facility: HOSPITAL | Age: 58
Discharge: HOME | End: 2018-08-02
Attending: INTERNAL MEDICINE
Payer: MEDICARE

## 2018-08-02 VITALS
WEIGHT: 189.63 LBS | HEART RATE: 57 BPM | BODY MASS INDEX: 28.08 KG/M2 | DIASTOLIC BLOOD PRESSURE: 75 MMHG | HEIGHT: 69 IN | SYSTOLIC BLOOD PRESSURE: 120 MMHG

## 2018-08-02 DIAGNOSIS — D86.0 SARCOIDOSIS OF LUNG: ICD-10-CM

## 2018-08-02 DIAGNOSIS — E55.9 VITAMIN D DEFICIENCY DISEASE: ICD-10-CM

## 2018-08-02 DIAGNOSIS — Z12.5 SCREENING PSA (PROSTATE SPECIFIC ANTIGEN): ICD-10-CM

## 2018-08-02 DIAGNOSIS — E78.5 HYPERLIPIDEMIA, UNSPECIFIED HYPERLIPIDEMIA TYPE: Primary | ICD-10-CM

## 2018-08-02 DIAGNOSIS — I10 ESSENTIAL HYPERTENSION: ICD-10-CM

## 2018-08-02 DIAGNOSIS — D86.83 IRIDOCYCLITIS DUE TO SARCOIDOSIS: ICD-10-CM

## 2018-08-02 PROCEDURE — 3008F BODY MASS INDEX DOCD: CPT | Mod: CPTII,S$GLB,, | Performed by: INTERNAL MEDICINE

## 2018-08-02 PROCEDURE — 71046 X-RAY EXAM CHEST 2 VIEWS: CPT | Mod: 26,,, | Performed by: RADIOLOGY

## 2018-08-02 PROCEDURE — 71046 X-RAY EXAM CHEST 2 VIEWS: CPT | Mod: TC

## 2018-08-02 PROCEDURE — 3074F SYST BP LT 130 MM HG: CPT | Mod: CPTII,S$GLB,, | Performed by: INTERNAL MEDICINE

## 2018-08-02 PROCEDURE — 3078F DIAST BP <80 MM HG: CPT | Mod: CPTII,S$GLB,, | Performed by: INTERNAL MEDICINE

## 2018-08-02 PROCEDURE — 99999 PR PBB SHADOW E&M-EST. PATIENT-LVL III: CPT | Mod: PBBFAC,,, | Performed by: INTERNAL MEDICINE

## 2018-08-02 PROCEDURE — 99214 OFFICE O/P EST MOD 30 MIN: CPT | Mod: S$GLB,,, | Performed by: INTERNAL MEDICINE

## 2018-08-02 RX ORDER — PRAVASTATIN SODIUM 40 MG/1
TABLET ORAL
COMMUNITY
Start: 2018-07-31 | End: 2018-08-02 | Stop reason: SDUPTHER

## 2018-08-02 RX ORDER — PRAVASTATIN SODIUM 40 MG/1
40 TABLET ORAL DAILY
Qty: 90 TABLET | Refills: 1 | Status: SHIPPED | OUTPATIENT
Start: 2018-08-02 | End: 2019-10-28 | Stop reason: SDUPTHER

## 2018-08-02 RX ORDER — ERGOCALCIFEROL 1.25 MG/1
50000 CAPSULE ORAL
Qty: 12 CAPSULE | Refills: 8 | Status: SHIPPED | OUTPATIENT
Start: 2018-08-02 | End: 2019-10-06 | Stop reason: SDUPTHER

## 2018-08-02 NOTE — PROGRESS NOTES
"Mr. Lackey is a 58 -year-old gentleman coming   in today to follow up on his ongoing medical problems to include:       1. Hypertension. He is on Norvasc 10 mg a day. He has had hypertension   for several years. Blood pressure today is 120/75. no med change. He denies any   chest pain, short-windedness, palpitations, nausea or vomiting.        2. He has hyperlipidemia. He is on pravastatin 40 mg a day. In Feb his chol was 180, HDL 47, .           3. He has vitamin D deficiency with history of sarcoidosis. We put him on   vitamin D. He is on a 2 x monthly vitamin D. In August his Vit was 18-  He just started back his vit D in Feb- and he is taking one a week- but there is a question about this.        4. sarcoidosis. 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    He was started on MTX as steroid sparing agent and is tolerating it well.  Currently off 4 tabs MTX and folic acid 1 mg daily He denies any respiratory symptoms including wheezing or exercise intolerance. He went to  [facility] (5/[facility]) . I reviewedthose notes.   Other problems include sarcoid and glaucoma. He is seeing Ophthalmology .  for glaucoma and he is on multiple drops for this.             ROS : Gen - no fatigue or significant weight change  Eyes - no eye pain or visual changes  ENT - no hoarseness or sore throat  CV - No chest pain NO palpitations.   Pulm - no cough or wheezing-- otherwise as above.   GI - no N/V/D   no dysuria or incontinence  MS - no joint pain or muscle pain  Skin - no rash, or c/o of skin lesions  Neuro - no HA, dizziness--- memory is doing well.   Heme - no abnormal bleeding or bruising  Endo - no polydipsia, or temperature changes  Psych - no anxiety or depression         PHYSICAL EXAMINATION: /75 (BP Location: Right arm, Patient Position: Sitting, BP Method: Medium (Manual))   Pulse (!) 57   Ht 5' 9" (1.753 m)   Wt 86 kg (189 lb 9.5 oz)   BMI 28.00 kg/m²     GENERAL: He is a well-appearing 58-year-old " gentleman in no acute   distress.  Poor vision- Legally blind  Eyes- sclera is injected but normal for him- no ptosis.   NECK: Supple. He has no JVD. Thyroid is not enlarged. Chest- CTA bilaterally.   CARDIOVASCULAR: S1, S2. Regular rate and rhythm.   LUNGS: Clear bilaterally- no wheeze.   ABDOMEN: Soft, nontender, no hepatosplenomegaly, no guarding, rebound or   tenderness. Slight distended.   LOWER EXTREMITIES: No edema. He also complains of some tinnitus today.   Ear examination was unremarkable.   ASSESSMENT:      1. Hypertension, -will increase Amlodipine to 10 mg a day   2. Hyperlipidemia Continue the pravastatin   3. Sarcoidosis, Reviewed notes from [facility],--He is set up to follow with [facility] and Optho, but has not seen Pulm since 2016- so will make referral   4. Vitamin D deficiency.- low   continue every 1 weeks.

## 2018-09-06 ENCOUNTER — OFFICE VISIT (OUTPATIENT)
Dept: PULMONOLOGY | Facility: CLINIC | Age: 58
End: 2018-09-06
Payer: MEDICARE

## 2018-09-06 ENCOUNTER — HOSPITAL ENCOUNTER (OUTPATIENT)
Dept: PULMONOLOGY | Facility: CLINIC | Age: 58
Discharge: HOME | End: 2018-09-06
Payer: MEDICARE

## 2018-09-06 VITALS
BODY MASS INDEX: 28.44 KG/M2 | OXYGEN SATURATION: 98 % | WEIGHT: 192 LBS | SYSTOLIC BLOOD PRESSURE: 138 MMHG | HEIGHT: 69 IN | HEART RATE: 56 BPM | DIASTOLIC BLOOD PRESSURE: 80 MMHG

## 2018-09-06 DIAGNOSIS — D86.0 SARCOIDOSIS OF LUNG: ICD-10-CM

## 2018-09-06 DIAGNOSIS — Z23 NEED FOR STREPTOCOCCUS PNEUMONIAE VACCINATION: ICD-10-CM

## 2018-09-06 DIAGNOSIS — D86.0 PULMONARY SARCOIDOSIS: Primary | ICD-10-CM

## 2018-09-06 LAB
PRE FEV1 FVC: 71
PRE FEV1: 2.21
PRE FVC: 3.1
PREDICTED FEV1 FVC: 80
PREDICTED FEV1: 3.46
PREDICTED FVC: 4.27

## 2018-09-06 PROCEDURE — 3008F BODY MASS INDEX DOCD: CPT | Mod: CPTII,,, | Performed by: INTERNAL MEDICINE

## 2018-09-06 PROCEDURE — 94010 BREATHING CAPACITY TEST: CPT | Mod: 26,S$PBB,, | Performed by: INTERNAL MEDICINE

## 2018-09-06 PROCEDURE — 3075F SYST BP GE 130 - 139MM HG: CPT | Mod: CPTII,,, | Performed by: INTERNAL MEDICINE

## 2018-09-06 PROCEDURE — 94010 BREATHING CAPACITY TEST: CPT | Mod: PBBFAC | Performed by: INTERNAL MEDICINE

## 2018-09-06 PROCEDURE — 94729 DIFFUSING CAPACITY: CPT | Mod: PBBFAC | Performed by: INTERNAL MEDICINE

## 2018-09-06 PROCEDURE — 99215 OFFICE O/P EST HI 40 MIN: CPT | Mod: 25,S$PBB,, | Performed by: INTERNAL MEDICINE

## 2018-09-06 PROCEDURE — 99213 OFFICE O/P EST LOW 20 MIN: CPT | Mod: PBBFAC,25 | Performed by: INTERNAL MEDICINE

## 2018-09-06 PROCEDURE — 90670 PCV13 VACCINE IM: CPT | Mod: PBBFAC

## 2018-09-06 PROCEDURE — 94729 DIFFUSING CAPACITY: CPT | Mod: 26,S$PBB,, | Performed by: INTERNAL MEDICINE

## 2018-09-06 PROCEDURE — 99999 PR PBB SHADOW E&M-EST. PATIENT-LVL III: CPT | Mod: PBBFAC,,, | Performed by: INTERNAL MEDICINE

## 2018-09-06 PROCEDURE — 99499 UNLISTED E&M SERVICE: CPT | Mod: S$GLB,,, | Performed by: INTERNAL MEDICINE

## 2018-09-06 PROCEDURE — 3079F DIAST BP 80-89 MM HG: CPT | Mod: CPTII,,, | Performed by: INTERNAL MEDICINE

## 2018-09-06 NOTE — LETTER
September 6, 2018      Carloz Desir Jr., MD  [address]  [facility] [address]           [facility]  [address]  [facility] [address]  Phone: [phone]          Patient: Jaguar Lackey   MR Number: [id number]   YOB: 1960   Date of Visit: 9/6/2018       Dear Dr. Carloz Desir Jr.:    Thank you for referring Jaguar Lackey to me for evaluation. Attached you will find relevant portions of my assessment and plan of care.    If you have questions, please do not hesitate to call me. I look forward to following Jaguar Lackey along with you.    Sincerely,    Tomi Crowder MD    Enclosure  CC:  No Recipients    If you would like to receive this communication electronically, please contact [email or web address] or [phone] to request more information on [employer] Link access.    For providers and/or their staff who would like to refer a patient to [facility], please contact us through our one-stop-shop provider referral line, [facility] , at [phone].    If you feel you have received this communication in error or would no longer like to receive these types of communications, please e-mail [email or web address]

## 2018-09-06 NOTE — PROGRESS NOTES
History & Physical  [facility]        SUBJECTIVE:     History of Present Illness:  Jaguar Lackey is a 58 y.o. male who presents for follow up of pulmonary sarcoidosis. He was diagnosed in 2002 due to uveitis. He had pulmonary involvement at the time of diagnosis as well. His only pulmonary symptoms at present are mild dyspnea when he is at peak exercise. He denies cough. He denies any hospitalizations for respiratory problems. He has not been on steroids in many years. He was on methotrexate in the past but stopped taking it over a year ago.     Review of patient's allergies indicates:  No Known Allergies    Past Medical History:   Diagnosis Date    Dry eyes     GERD (gastroesophageal reflux disease)     Glaucoma     Hyperlipidemia     Hypertension     Myalgia and myositis 2/16/2016    Sarcoidosis of lung     Uveitis      Past Surgical History:   Procedure Laterality Date    BAERVELDT GLAUCOMA IMPLANT AND CATARACT REMOVAL Right 9/24/14    OD ()    Bleb Revision Right 3/12    OD with Dr. Pratt    CATARACT EXTRACTION W/  INTRAOCULAR LENS IMPLANT Left n/a    OS (Dr. Menard)    CATARACT EXTRACTION W/  INTRAOCULAR LENS IMPLANT Right 9/24/14    OD with glaucoma sx ()    FRACTURE SURGERY      right foot    Needling Bleb Revision   3/12    OD Dr. Pratt    PARS PLANA VITRECTOMY W/ ENDOPHOTOCOAGULATION Left 2005    TRABECULECTOMY Right 2010    OD Dr. Menard     Family History   Problem Relation Age of Onset    Heart disease Father     No Known Problems Mother     Hypertension Brother     Diabetes Brother     Blindness Maternal Uncle     Glaucoma Paternal Aunt     No Known Problems Sister     No Known Problems Maternal Aunt     No Known Problems Paternal Uncle     No Known Problems Maternal Grandmother     No Known Problems Maternal Grandfather     No Known Problems Paternal Grandmother     No Known Problems Paternal Grandfather     Cancer Neg Hx      "Macular degeneration Neg Hx     Retinal detachment Neg Hx     Rheum arthritis Neg Hx     Lupus Neg Hx     Inflammatory bowel disease Neg Hx     Psoriasis Neg Hx     Amblyopia Neg Hx     Cataracts Neg Hx     Strabismus Neg Hx     Stroke Neg Hx     Thyroid disease Neg Hx        Review of Systems:  CV: no syncope  ENT: no sore throat  Resp: per hpi  Eyes: no visual changes  Gastrointestinal: no nausea or vomiting  Integument/Breast: no rash  Musculoskeletal: no arthralgias  Neurological: no headaches  Behavioral/Psych: no confusion or depression  Heme: no bleeding      OBJECTIVE:     Vital Signs  Vitals:    09/06/18 0828   BP: 138/80   Pulse: (!) 56   SpO2: 98%   Weight: 87.1 kg (192 lb)   Height: 5' 9" (1.753 m)       Physical Exam:  General: no distress  Eyes:  conjunctivae/corneas clear  Nose: no discharge   Neck: no jugular venous distention  Lungs:  normal respiratory effort and no wheezes or rales  Heart: regular rate and rhythm and no murmur  Abdomen: non-distended  Extremities: no cyanosis or edema, or clubbing  Skin: No rashes or lesions. good skin turgor  Neurologic: alert, oriented, thought content appropriate    Laboratory:  All recent laboratory tests reviewed    Chest Imaging, My Impression:   Bilateral interstitial disease with hilar adenopathy, stable & unchanged in comparison to prior CXR 2016    Diagnostic Results:  CT: Reviewed  ECG: Reviewed    PFT obtained today: FVC, FEV1, & DLCO mildly decreased. Unchanged in comparison to PFT from 2014    ASSESSMENT/PLAN:     1) Pulmonary Sarcoidosis. Stable disease. No evidence of pulmonary disease progression in the absence of any systemic treatment. Education & counseling provided.  2) Need for pneumonia vaccination. Prevnar vaccine administered today.    Patient advised to get flu vaccine next month.    Dispo: Return to clinic in one year for disease monitoring.      Tomi Crowder MD  [facility]    "

## 2018-09-08 NOTE — PROGRESS NOTES
Assessment /Plan     For exam results, see Encounter Report.    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 Uveitic glaucoma - Both Eyes OD>OS  2/2 sarcoidosis  Old pt of Dr. Wagner and Dr. Menard  First HVF 2012  First photos 2011     Family history   neg  Glaucoma meds   Off all drops and diamox 2013 to 2014  (pt lost insurance)(( had +++ progression off gtts)                              back on gtts combigan ou bid / travatan os q day  / PA od every other day - 3 x week  H/O adverse rxn to glaucoma drops  None  LASERS   none  GLAUCOMA SURGERIES   Trab OD (3/20/ 2010 -[facility]ian), Bleb Revision OD  3/21/2012 Loft// complex phaco/Baerveldt od 9/24/2014  OTHER EYE SURGERIES   PPV/Endo O5, CE OS (04)  CDR  0.95/0.5  Tbase  ?    Tmax  36/22 on meds      Ttarget   16/20       HVF  10-2 stim III OD (3/5/12)-sup arc,inf alt, tiny residual island OD; 24-2 OS w/ marked general depression             3/4/2013 ---HVF 10-2 stim III OD - SAD / Inf Alt los s            5 test [id number]  24-2 - stim V OS  // Inf Alt loss/SAD            5 test 10-2 stim  V 2014 to 2018 - very tiny residual island of vision - essentially ext.od // gen dep - poor  "test     Gonio -  PAS OD - 360 // OS -  f    CCT  525/531  OCT  5 test 2011 to 2018  - dec. S/I/N od // Dec S/T/I   HRT  7 test 2012 to 04/2017 - MR -  Dec. Sna I/N od // xx os /// CDR 0.79 od // xx os  Disc photos  2011, 2013, 2014, 2015, 2018   - OIS     Ttoday 15/16 ((down from 23/16 - now off steroid gtts od ))  Test done today -IOP / iritis check OFF all steroids - was using PA 3 x week od)    2. Secondary iritis - Both Eyes      3. 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 Postinflammatory optic atrophy - Left Eye   -Sarcoid, + APD, uncertain etiology     5. Relative afferent pupillary defect OS     6. Steroid responders borderline glaucoma - Both Eyes -increased IOP w/ Pred Acetate. Does better w/ Vexol     7. Legal blindness - Both EyeS OD based on VF loss and OS based on VA.     8. Post-operative state - Both Eyes   -s/p bleb revision OD (3/21/12) had extensive subconj fibrosis, but eventually has done well.     9 Pseudophakic OD  S/P complex phaco/IOL / baerveldt od 9/24/2014   PC IOL os - done years ago - ? [place] - ? IOL sewn in        Plan:  Uveitic glaucoma  Angle closure - PAS ou   Lost to F/U for 10 months from 9/2013 to 7/2014  - VA od went  from 20/40 to LP  Was already CF at 3 ft os- 2/2 ON pallor from sarcoid  IOP OD was 64 - off drops since 9/2013 - when re-presented     Pt is "legally blind" - he may want to apply for disability.    Pt previously had issues with insurance, but now has new insurance.  Discussed with patient that the vision loss od is irreversible 2/2 glaucoma and the importance of compliance with gtt/meds.     IOP - creeping up od (7/30/2018 ) - usually 10-16 - up to 23 today (? steorid effect)   IOP continues to be improved od post tube and back on combigan and dorzolamide   PA  od - for chronic iritis - uses PA every other day  od - try and taper off - IOP creeping up od (7/30/2018 )     - may eventually need yag to PCO od  - but this will likely trigger " a big flare up of the iritis so hold off as long as possible  The anterior chamber fibrin has resolved  - pre treat with steroid gtts and ? PO medrol dose khadra - on MTX   Pt will think about it      Pt has seen the pulmonologist  Has seen an internist   Did not see rheumatology - but they can be consulted if steroid sparing immunosuppression is needed to control the iritis // saw cececlaudia 11/9/2012    Now on MTX for sarcoid and iritis - off - stopped on his own - seems to be doing ok   No increase in inflammationn od on 7/30/2018     Cont combigan  bid ou  Cont PG's (travatan ) OS only // switch to latanoprost os q hs - for insurance reasons   - cont  dorzolamide OU -  twice daily ou --> educated patient to use in BOTH eyes (states he only uses OD)  - cont with  PA OD every other day OD - try tapering off - IOP up to 23 on 7/30/2018     Complains of blurry vision OS that is worsening --? Band keratopathy? Can refer for removal w/ cornea   - seen by cristine 5/2/2017 - not felt to be VS     Loose sutue removed - cornea - OD 7/30/2018 - vigamox qid x 1 week - bottle given      F/U 8 weeks /  iritis check / IOP check  - IOP better off PA 3 x week od and with increase in azopt to tid - but need to monitor for increase iritis od     Pt is getting training (first step) at the [facility] - says it is going well.    ADDENDUM - 5/20/2018  NOTE FROM RHEUMATOLOGIST - HE HAS STOPPED MTX ON HIS OWN.   Wonders if he needs to re-start it    Will monitor off for now and ask her to re-start it if the iritis flares up

## 2018-09-10 ENCOUNTER — LAB VISIT (OUTPATIENT)
Dept: LAB | Facility: HOSPITAL | Age: 58
End: 2018-09-10
Attending: INTERNAL MEDICINE
Payer: MEDICARE

## 2018-09-10 ENCOUNTER — OFFICE VISIT (OUTPATIENT)
Dept: OPHTHALMOLOGY | Facility: CLINIC | Age: 58
End: 2018-09-10
Payer: MEDICARE

## 2018-09-10 DIAGNOSIS — H26.491 POSTERIOR CAPSULAR OPACIFICATION VISUALLY SIGNIFICANT, RIGHT EYE: ICD-10-CM

## 2018-09-10 DIAGNOSIS — H20.9 UVEITIC GLAUCOMA OF BOTH EYES, SEVERE STAGE: ICD-10-CM

## 2018-09-10 DIAGNOSIS — H26.491 PCO (POSTERIOR CAPSULAR OPACIFICATION), RIGHT: ICD-10-CM

## 2018-09-10 DIAGNOSIS — H40.43X0 UVEITIC GLAUCOMA OF BOTH EYES, UNSPECIFIED GLAUCOMA STAGE: ICD-10-CM

## 2018-09-10 DIAGNOSIS — H57.09 RELATIVE AFFERENT PUPILLARY DEFECT: ICD-10-CM

## 2018-09-10 DIAGNOSIS — D86.83 IRIDOCYCLITIS DUE TO SARCOIDOSIS: ICD-10-CM

## 2018-09-10 DIAGNOSIS — H47.292 POSTINFLAMMATORY OPTIC ATROPHY, LEFT: ICD-10-CM

## 2018-09-10 DIAGNOSIS — H21.523 PAS (PERIPH ANTERIOR SYNECHIAE), BILATERAL: ICD-10-CM

## 2018-09-10 DIAGNOSIS — H54.8 LEGAL BLINDNESS: ICD-10-CM

## 2018-09-10 DIAGNOSIS — H40.43X3 UVEITIC GLAUCOMA, BILATERAL, SEVERE STAGE: Primary | ICD-10-CM

## 2018-09-10 DIAGNOSIS — H20.9 UVEITIC GLAUCOMA OF BOTH EYES, UNSPECIFIED GLAUCOMA STAGE: ICD-10-CM

## 2018-09-10 DIAGNOSIS — E78.5 HYPERLIPIDEMIA, UNSPECIFIED HYPERLIPIDEMIA TYPE: ICD-10-CM

## 2018-09-10 DIAGNOSIS — H20.9 UVEITIC GLAUCOMA, BILATERAL, SEVERE STAGE: Primary | ICD-10-CM

## 2018-09-10 DIAGNOSIS — E55.9 VITAMIN D DEFICIENCY DISEASE: ICD-10-CM

## 2018-09-10 DIAGNOSIS — H18.422 BAND KERATOPATHY, LEFT: ICD-10-CM

## 2018-09-10 DIAGNOSIS — H40.43X3 UVEITIC GLAUCOMA OF BOTH EYES, SEVERE STAGE: ICD-10-CM

## 2018-09-10 DIAGNOSIS — H40.043 STEROID RESPONDERS BORDERLINE GLAUCOMA, BILATERAL: ICD-10-CM

## 2018-09-10 DIAGNOSIS — Z96.1 PSEUDOPHAKIA OF BOTH EYES: ICD-10-CM

## 2018-09-10 DIAGNOSIS — D86.0 SARCOIDOSIS OF LUNG: ICD-10-CM

## 2018-09-10 DIAGNOSIS — Z12.5 SCREENING PSA (PROSTATE SPECIFIC ANTIGEN): ICD-10-CM

## 2018-09-10 LAB
25(OH)D3+25(OH)D2 SERPL-MCNC: 38 NG/ML
ALBUMIN SERPL BCP-MCNC: 4.2 G/DL
ALP SERPL-CCNC: 64 U/L
ALT SERPL W/O P-5'-P-CCNC: 38 U/L
ANION GAP SERPL CALC-SCNC: 8 MMOL/L
AST SERPL-CCNC: 42 U/L
BILIRUB SERPL-MCNC: 0.7 MG/DL
BUN SERPL-MCNC: 14 MG/DL
CALCIUM SERPL-MCNC: 9.9 MG/DL
CHLORIDE SERPL-SCNC: 108 MMOL/L
CO2 SERPL-SCNC: 27 MMOL/L
COMPLEXED PSA SERPL-MCNC: 3.6 NG/ML
CREAT SERPL-MCNC: 0.9 MG/DL
EST. GFR  (AFRICAN AMERICAN): >60 ML/MIN/1.73 M^2
EST. GFR  (NON AFRICAN AMERICAN): >60 ML/MIN/1.73 M^2
GLUCOSE SERPL-MCNC: 98 MG/DL
HGB BLD-MCNC: 16 G/DL
POTASSIUM SERPL-SCNC: 4.2 MMOL/L
PROT SERPL-MCNC: 7.9 G/DL
SODIUM SERPL-SCNC: 143 MMOL/L

## 2018-09-10 PROCEDURE — 99212 OFFICE O/P EST SF 10 MIN: CPT | Mod: PBBFAC | Performed by: OPHTHALMOLOGY

## 2018-09-10 PROCEDURE — 84153 ASSAY OF PSA TOTAL: CPT

## 2018-09-10 PROCEDURE — 80053 COMPREHEN METABOLIC PANEL: CPT

## 2018-09-10 PROCEDURE — 36415 COLL VENOUS BLD VENIPUNCTURE: CPT

## 2018-09-10 PROCEDURE — 92012 INTRM OPH EXAM EST PATIENT: CPT | Mod: S$PBB,,, | Performed by: OPHTHALMOLOGY

## 2018-09-10 PROCEDURE — 99999 PR PBB SHADOW E&M-EST. PATIENT-LVL II: CPT | Mod: PBBFAC,,, | Performed by: OPHTHALMOLOGY

## 2018-09-10 PROCEDURE — 82306 VITAMIN D 25 HYDROXY: CPT

## 2018-09-10 PROCEDURE — 85018 HEMOGLOBIN: CPT

## 2018-09-10 PROCEDURE — 99499 UNLISTED E&M SERVICE: CPT | Mod: S$GLB,,, | Performed by: OPHTHALMOLOGY

## 2018-09-10 RX ORDER — LATANOPROST 50 UG/ML
1 SOLUTION/ DROPS OPHTHALMIC NIGHTLY
Qty: 2.5 ML | Refills: 12 | Status: SHIPPED | OUTPATIENT
Start: 2018-09-10 | End: 2018-12-13 | Stop reason: SDUPTHER

## 2018-09-10 RX ORDER — BRIMONIDINE TARTRATE AND TIMOLOL MALEATE 2; 5 MG/ML; MG/ML
1 SOLUTION OPHTHALMIC 2 TIMES DAILY
Qty: 10 ML | Refills: 12 | Status: SHIPPED | OUTPATIENT
Start: 2018-09-10 | End: 2019-09-18 | Stop reason: SDUPTHER

## 2018-09-10 RX ORDER — BRINZOLAMIDE 10 MG/ML
1 SUSPENSION/ DROPS OPHTHALMIC 3 TIMES DAILY
Qty: 10 ML | Refills: 12 | Status: SHIPPED | OUTPATIENT
Start: 2018-09-10 | End: 2019-11-25

## 2018-11-08 NOTE — PROGRESS NOTES
Assessment /Plan     For exam results, see Encounter Report.    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 Uveitic glaucoma - Both Eyes OD>OS  2/2 sarcoidosis  Old pt of Dr. Wagner and Dr. Menard  First HVF 2012  First photos 2011     Family history   neg  Glaucoma meds   Off all drops and diamox 2013 to 2014  (pt lost insurance)(( had +++ progression off gtts)                              back on gtts combigan/azopt OU and latanoprost OS  H/O adverse rxn to glaucoma drops  None  LASERS   none  GLAUCOMA SURGERIES   Trab OD (3/20/ 2010 -[facility]), Bleb Revision OD  3/21/2012 Loft// complex phaco/Baerveldt od 9/24/2014  OTHER EYE SURGERIES   PPV/Endo O5, CE OS (04)  CDR  0.95/0.5  Tbase  ?    Tmax  36/22 on meds      Ttarget   16/20       HVF  10-2 stim III OD (3/5/12)-sup arc,inf alt, tiny residual island OD; 24-2 OS w/ marked general depression             3/4/2013 ---HVF 10-2 stim III OD - SAD / Inf Alt los s            5 test [id number]  24-2 - stim V OS  // Inf Alt loss/SAD            5 test 10-2 stim  V 2014 to 2018 - very tiny residual island of vision - essentially ext.od // gen dep - poor test     Gonio -  PAS OD - 360 // OS - PAS  "360 f    CCT  525/531  OCT  5 test 2011 to 2018  - dec. S/I/N od // Dec S/T/I   HRT  7 test 2012 to 04/2017 - MR -  Dec. S juanpablopaulo I/N od // xx os /// CDR 0.79 od // xx os  Disc photos  2011, 2013, 2014, 2015, 2018   - OIS     Ttoday 13/14 ((down from 23/16 - now off steroid gtts od ))  Test done today -IOP / iritis check OFF all steroids - was using PA 3 x week od)    2. Secondary iritis - Both Eyes      3. 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 Postinflammatory optic atrophy - Left Eye   -Sarcoid, + APD, uncertain etiology     5. Relative afferent pupillary defect OS     6. Steroid responders borderline glaucoma - Both Eyes -increased IOP w/ Pred Acetate. Does better w/ Vexol     7. Legal blindness - Both EyeS OD based on VF loss and OS based on VA.     8. Post-operative state - Both Eyes   -s/p bleb revision OD (3/21/12) had extensive subconj fibrosis, but eventually has done well.     9 Pseudophakic OD  S/P complex phaco/IOL / baerveldt od 9/24/2014   PC IOL os - done years ago - ? [place] - ? IOL sewn in        Plan:  Uveitic glaucoma  Angle closure - PAS ou   Lost to F/U for 10 months from 9/2013 to 7/2014  - VA od went  from 20/40 to LP  Was already CF at 3 ft os- 2/2 ON pallor from sarcoid  IOP OD was 64 - off drops since 9/2013 - when re-presented     Pt is "legally blind" - he may want to apply for disability.    Pt previously had issues with insurance, but now has new insurance.  Discussed with patient that the vision loss od is irreversible 2/2 glaucoma and the importance of compliance with gtt/meds.      - may eventually need yag to PCO od  - but this will likely trigger a big flare up of the iritis so hold off as long as possible  The anterior chamber fibrin OD  has resolved  - if needs yag cap can  pre treat with steroid gtts and ? PO medrol dose khadra  Pt will think about it      Pt has seen the pulmonologist  Has seen an internist   Did not see rheumatology - but they can be consulted if " steroid sparing immunosuppression is needed to control the iritis // saw markfrancy 11/9/2012    Was  on MTX for sarcoid and iritis - off - stopped on his own - seems to be doing ok (since 5/30/2018)  No increase in inflammationn od on 7/30/2018 // still ok off steroid gtts 11/12/2018    Cont combigan  bid ou  Cont PG's (travatan ) OS only // switch to latanoprost os q hs - for insurance reasons   - cont  dorzolamide OU -  twice daily ou --> educated patient to use in BOTH eyes (states he only uses OD)    Complains of blurry vision OS that is worsening --? Band keratopathy? Can refer for removal w/ cornea   - seen by cristine 5/2/2017 - not felt to be VS     CSM - glaucoma gtts   Stay off steroid gtts for now   F/U 4 months with HVF - OD 10-2 stim V // OS 24-2 stim V // DFE // OCT     Pt is getting training (first step) at the [facility] - says it is going well.

## 2018-11-12 ENCOUNTER — OFFICE VISIT (OUTPATIENT)
Dept: OPHTHALMOLOGY | Facility: CLINIC | Age: 58
End: 2018-11-12
Payer: MEDICARE

## 2018-11-12 DIAGNOSIS — H54.8 LEGAL BLINDNESS: ICD-10-CM

## 2018-11-12 DIAGNOSIS — H21.523 PAS (PERIPH ANTERIOR SYNECHIAE), BILATERAL: ICD-10-CM

## 2018-11-12 DIAGNOSIS — H26.491 POSTERIOR CAPSULAR OPACIFICATION VISUALLY SIGNIFICANT, RIGHT EYE: ICD-10-CM

## 2018-11-12 DIAGNOSIS — H18.422 BAND KERATOPATHY, LEFT: ICD-10-CM

## 2018-11-12 DIAGNOSIS — H20.9 UVEITIC GLAUCOMA, BILATERAL, SEVERE STAGE: Primary | ICD-10-CM

## 2018-11-12 DIAGNOSIS — H26.491 PCO (POSTERIOR CAPSULAR OPACIFICATION), RIGHT: ICD-10-CM

## 2018-11-12 DIAGNOSIS — Z96.1 PSEUDOPHAKIA OF BOTH EYES: ICD-10-CM

## 2018-11-12 DIAGNOSIS — D86.83 IRIDOCYCLITIS DUE TO SARCOIDOSIS: ICD-10-CM

## 2018-11-12 DIAGNOSIS — H47.292 POSTINFLAMMATORY OPTIC ATROPHY, LEFT: ICD-10-CM

## 2018-11-12 DIAGNOSIS — H40.43X3 UVEITIC GLAUCOMA, BILATERAL, SEVERE STAGE: Primary | ICD-10-CM

## 2018-11-12 DIAGNOSIS — H40.043 STEROID RESPONDERS BORDERLINE GLAUCOMA, BILATERAL: ICD-10-CM

## 2018-11-12 DIAGNOSIS — H57.09 RELATIVE AFFERENT PUPILLARY DEFECT: ICD-10-CM

## 2018-11-12 PROCEDURE — 99499 UNLISTED E&M SERVICE: CPT | Mod: S$GLB,,, | Performed by: OPHTHALMOLOGY

## 2018-11-12 PROCEDURE — 99999 PR PBB SHADOW E&M-EST. PATIENT-LVL II: CPT | Mod: PBBFAC,,, | Performed by: OPHTHALMOLOGY

## 2018-11-12 PROCEDURE — 92012 INTRM OPH EXAM EST PATIENT: CPT | Mod: S$GLB,,, | Performed by: OPHTHALMOLOGY

## 2018-11-27 ENCOUNTER — OFFICE VISIT (OUTPATIENT)
Dept: RHEUMATOLOGY | Facility: CLINIC | Age: 58
End: 2018-11-27
Payer: MEDICARE

## 2018-11-27 VITALS
SYSTOLIC BLOOD PRESSURE: 130 MMHG | DIASTOLIC BLOOD PRESSURE: 79 MMHG | BODY MASS INDEX: 28.96 KG/M2 | HEART RATE: 59 BPM | HEIGHT: 69 IN | WEIGHT: 195.56 LBS

## 2018-11-27 DIAGNOSIS — D86.9 SARCOIDOSIS: Primary | ICD-10-CM

## 2018-11-27 DIAGNOSIS — D86.83 IRIDOCYCLITIS DUE TO SARCOIDOSIS: ICD-10-CM

## 2018-11-27 PROCEDURE — 99214 OFFICE O/P EST MOD 30 MIN: CPT | Mod: S$GLB,,, | Performed by: INTERNAL MEDICINE

## 2018-11-27 PROCEDURE — 3078F DIAST BP <80 MM HG: CPT | Mod: CPTII,S$GLB,, | Performed by: INTERNAL MEDICINE

## 2018-11-27 PROCEDURE — 3008F BODY MASS INDEX DOCD: CPT | Mod: CPTII,S$GLB,, | Performed by: INTERNAL MEDICINE

## 2018-11-27 PROCEDURE — 99499 UNLISTED E&M SERVICE: CPT | Mod: S$GLB,,, | Performed by: INTERNAL MEDICINE

## 2018-11-27 PROCEDURE — 3075F SYST BP GE 130 - 139MM HG: CPT | Mod: CPTII,S$GLB,, | Performed by: INTERNAL MEDICINE

## 2018-11-27 PROCEDURE — 99999 PR PBB SHADOW E&M-EST. PATIENT-LVL III: CPT | Mod: PBBFAC,,, | Performed by: INTERNAL MEDICINE

## 2018-11-27 ASSESSMENT — ROUTINE ASSESSMENT OF PATIENT INDEX DATA (RAPID3)
PATIENT GLOBAL ASSESSMENT SCORE: 0
PSYCHOLOGICAL DISTRESS SCORE: 0
MDHAQ FUNCTION SCORE: .4
TOTAL RAPID3 SCORE: .44
PAIN SCORE: 0
AM STIFFNESS SCORE: 0, NO
FATIGUE SCORE: 0

## 2018-11-27 NOTE — PROGRESS NOTES
"Subjective:       Patient ID: Jaguar Lackey is a 58 y.o. male.    Chief Complaint: Sarcoidosis    HPI:  Jaguar Lackey is a 58 y.o. 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    He was started on MTX as steroid sparing agent and tolerated it well but decided to stop it in April or May 2018.  Currently off 4 tabs MTX and folic acid 1 mg daily      Patient saw ophthamologist in Nov 2018 and there was no inflammation.  Has seen pulmonary recently and CXR was normal per his report.          Review of Systems   Constitutional: Negative for fatigue, fever and unexpected weight change.   HENT: Negative.    Eyes:        Dry eyes   Respiratory: Negative for shortness of breath.    Cardiovascular: Negative.    Gastrointestinal: Negative.    Endocrine: Negative.    Genitourinary: Negative.    Musculoskeletal: Negative.    Skin: Negative.    Allergic/Immunologic: Negative.    Neurological: Negative.    Hematological: Negative.    Psychiatric/Behavioral: Negative.          Objective:   /79   Pulse (!) 59   Ht 5' 9" (1.753 m)   Wt 88.7 kg (195 lb 8.8 oz)   BMI 28.88 kg/m²      Physical Exam   Constitutional: He is oriented to person, place, and time and well-developed, well-nourished, and in no distress.   HENT:   Head: Normocephalic and atraumatic.   Eyes: Conjunctivae and EOM are normal.   Neck: Neck supple.   Cardiovascular: Normal rate, regular rhythm and normal heart sounds.    Pulmonary/Chest: Effort normal and breath sounds normal.   Abdominal: Soft. Bowel sounds are normal.   Neurological: He is alert and oriented to person, place, and time. Gait normal.   Skin: Skin is warm and dry.     Psychiatric: Mood and affect normal.   Musculoskeletal:   5/5 UE and LE bilaterally proximally and distally            LABS    Component      Latest Ref Rng & Units 9/10/2018   Sodium      136 - 145 mmol/L 143   Potassium      " 3.5 - 5.1 mmol/L 4.2   Chloride      95 - 110 mmol/L 108   CO2      23 - 29 mmol/L 27   Glucose      70 - 110 mg/dL 98   BUN, Bld      6 - 20 mg/dL 14   Creatinine      0.5 - 1.4 mg/dL 0.9   Calcium      8.7 - 10.5 mg/dL 9.9   Total Protein      6.0 - 8.4 g/dL 7.9   Albumin      3.5 - 5.2 g/dL 4.2   Total Bilirubin      0.1 - 1.0 mg/dL 0.7   Alkaline Phosphatase      55 - 135 U/L 64   AST      10 - 40 U/L 42 (H)   ALT      10 - 44 U/L 38   Anion Gap      8 - 16 mmol/L 8   eGFR if African American      >60 mL/min/1.73 m:2 >60.0   eGFR if non African American      >60 mL/min/1.73 m:2 >60.0   Vit D, 25-Hydroxy      30 - 96 ng/mL 38   Hemoglobin      14.0 - 18.0 g/dL 16.0   PSA, SCREEN      0.00 - 4.00 ng/mL 3.6       Assessment:       1. Sarcoidosis involving the lungs and the eyes. Most prominent is eye involvement.   No skin or joint involvement at this time. Possible skin involvement in the past.   No off MTX as immunosuppression to help with eye involvement. Humira denied by insurance.   2. HTN. On medication   3. History of bilateral knee injections in the past.  4. Vitamin D deficiency. Managed by primary doctor.   5. Left shoulder pain. Improved some.  6. SOB with exercise.   Plan:       1. Hold MTX per patient request.  2. Consider restarting MTX in the future if Dr. Pratt eye specialist felt it was necessary.  3. Humira denied by insurance.   4. Continue exercise     RTO 12 months/prn

## 2018-12-13 DIAGNOSIS — H40.43X3 UVEITIC GLAUCOMA, BILATERAL, SEVERE STAGE: ICD-10-CM

## 2018-12-13 DIAGNOSIS — H20.9 UVEITIC GLAUCOMA, BILATERAL, SEVERE STAGE: ICD-10-CM

## 2018-12-13 RX ORDER — LATANOPROST 50 UG/ML
SOLUTION/ DROPS OPHTHALMIC
Qty: 2.5 ML | Refills: 4 | Status: SHIPPED | OUTPATIENT
Start: 2018-12-13 | End: 2019-07-18 | Stop reason: SDUPTHER

## 2019-03-17 NOTE — PROGRESS NOTES
Assessment /Plan     For exam results, see Encounter Report.    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 Uveitic glaucoma - Both Eyes OD>OS  2/2 sarcoidosis  Old pt of Dr. Wagner and Dr. Menard  First HVF 2012  First photos 2011     Family history   neg  Glaucoma meds   Off all drops and diamox 2013 to 2014  (pt lost insurance)(( had +++ progression off gtts)                              back on gtts combigan/azopt OU and latanoprost OS  H/O adverse rxn to glaucoma drops  None  LASERS   none  GLAUCOMA SURGERIES   Trab OD (3/20/ 2010 -Derian), Bleb Revision OD  3/21/2012 Loft// complex phaco/Baerveldt od 9/24/2014  OTHER EYE SURGERIES   PPV/Endo O5, CE OS (04)  CDR  0.95/0.5  Tbase  ?    Tmax  36/22 on meds      Ttarget   16/20       HVF  10-2 stim III OD (3/5/12)-sup arc,inf alt, tiny residual island OD; 24-2 OS w/ marked general depression             3/4/2013 ---HVF 10-2 stim III OD - SAD / Inf Alt los s            OD - 6 test 10-2 stim V - 2014 to 2019 - dense IAD / dense SAD - tiny central  island - stable             OS - 5 test [id number]  24-2 - stim V OS  // Inf Alt loss/SAD (3/2019 - 10-2 stim V done by mistake)     Gonio  "-  PAS OD - 360 // OS -  f    CCT  525/531  OCT  6 test 2011 to 2019   - dec. S/I/N od // Dec S/N/I   HRT  7 test 2012 to 04/2017 - MR -  Dec. S juanpablopaulo I/N od // xx os /// CDR 0.79 od // xx os  Disc photos  2011, 2013, 2014, 2015, 2018   - OIS     Ttoday 15/12 ((down from 23/16 - now off steroid gtts od ))  Test done today -IOP / iritis check OFF all steroids -  // HVF 10-2 stim V // DFE // OCT    2. Secondary iritis - Both Eyes      3. 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 Postinflammatory optic atrophy - Left Eye   -Sarcoid, + APD, uncertain etiology     5. Relative afferent pupillary defect OS     6. Steroid responders borderline glaucoma - Both Eyes -increased IOP w/ Pred Acetate. Does better w/ Vexol     7. Legal blindness - Both EyeS OD based on VF loss and OS based on [facility].     8. Post-operative state - Both Eyes   -s/p bleb revision OD (3/21/12) had extensive subconj fibrosis, but eventually has done well.     9 Pseudophakic OD  S/P complex phaco/IOL / baerveldt od 9/24/2014   PC IOL os - done years ago - ? [facility] - ? IOL sewn in        Plan:  Uveitic glaucoma  Angle closure - PAS ou   Lost to F/U for 10 months from 9/2013 to 7/2014  - [facility] od went  from 20/40 to LP  Was already CF at 3 ft os- 2/2 ON pallor from sarcoid  IOP OD was 64 - off drops since 9/2013 - when re-presented     Pt is "legally blind" - he may want to apply for disability.    Pt previously had issues with insurance, but now has new insurance.  Discussed with patient that the vision loss od is irreversible 2/2 glaucoma and the importance of compliance with gtt/meds.        Pt C/O cont decrease in vision   -very dense PCO now   -rec yag to PCO od  - but this will likely trigger a big flare up of the iritis   -will use durezol and ? Medrol dose pack to try and control iritis post yag cap     Developed  anterior chamber fibrin OD  Post GDD  - it has has resolved - but prone to iritis with any intervention     Pt has seen the " pulmonologist  Has seen an internist   Did not see rheumatology - but they can be consulted if steroid sparing immunosuppression is needed to control the iritis // saw mark-claudia 11/9/2012    Was  on MTX for sarcoid and iritis - off - stopped on his own - seems to be doing ok (since 5/30/2018)  No increase in inflammationn od on 7/30/2018 // still ok off steroid gtts 11/12/2018    Cont combigan  bid ou  Cont PG's (travatan ) OS only // switch to latanoprost os q hs - for insurance reasons   - cont  dorzolamide OU -  twice daily ou --> educated patient to use in BOTH eyes (states he only uses OD)    Complains of blurry vision OS that is worsening --? Band keratopathy? Can refer for removal w/ cornea   - seen by cristine 5/2/2017 - not felt to be VS     CSM - glaucoma gtts   Stay off steroid gtts for now     F/U yag cap od - use durezol and medrol dose khadra     Pt is getting training (first step) at the [facility] - says it is going well.- uses a mobility cane

## 2019-03-18 ENCOUNTER — OFFICE VISIT (OUTPATIENT)
Dept: OPHTHALMOLOGY | Facility: CLINIC | Age: 59
End: 2019-03-18
Payer: MEDICARE

## 2019-03-18 ENCOUNTER — CLINICAL SUPPORT (OUTPATIENT)
Dept: OPHTHALMOLOGY | Facility: CLINIC | Age: 59
End: 2019-03-18
Payer: MEDICARE

## 2019-03-18 DIAGNOSIS — H26.491 PCO (POSTERIOR CAPSULAR OPACIFICATION), RIGHT: ICD-10-CM

## 2019-03-18 DIAGNOSIS — H20.9 UVEITIC GLAUCOMA, BILATERAL, SEVERE STAGE: Primary | ICD-10-CM

## 2019-03-18 DIAGNOSIS — H47.292 POSTINFLAMMATORY OPTIC ATROPHY, LEFT: ICD-10-CM

## 2019-03-18 DIAGNOSIS — H40.43X3 UVEITIC GLAUCOMA, BILATERAL, SEVERE STAGE: Primary | ICD-10-CM

## 2019-03-18 DIAGNOSIS — D86.83 IRIDOCYCLITIS DUE TO SARCOIDOSIS: ICD-10-CM

## 2019-03-18 DIAGNOSIS — H54.8 LEGAL BLINDNESS: ICD-10-CM

## 2019-03-18 DIAGNOSIS — Z96.1 PSEUDOPHAKIA OF BOTH EYES: ICD-10-CM

## 2019-03-18 DIAGNOSIS — H18.422 BAND KERATOPATHY, LEFT: ICD-10-CM

## 2019-03-18 DIAGNOSIS — H57.09 RELATIVE AFFERENT PUPILLARY DEFECT: ICD-10-CM

## 2019-03-18 DIAGNOSIS — H40.043 STEROID RESPONDERS BORDERLINE GLAUCOMA, BILATERAL: ICD-10-CM

## 2019-03-18 DIAGNOSIS — H21.523 PAS (PERIPH ANTERIOR SYNECHIAE), BILATERAL: ICD-10-CM

## 2019-03-18 DIAGNOSIS — H20.9 UVEITIC GLAUCOMA, BILATERAL, SEVERE STAGE: ICD-10-CM

## 2019-03-18 DIAGNOSIS — H26.491 POSTERIOR CAPSULAR OPACIFICATION VISUALLY SIGNIFICANT, RIGHT EYE: ICD-10-CM

## 2019-03-18 DIAGNOSIS — H40.43X3 UVEITIC GLAUCOMA, BILATERAL, SEVERE STAGE: ICD-10-CM

## 2019-03-18 PROCEDURE — 99999 PR PBB SHADOW E&M-EST. PATIENT-LVL II: CPT | Mod: PBBFAC,,, | Performed by: OPHTHALMOLOGY

## 2019-03-18 PROCEDURE — 92133 POSTERIOR SEGMENT OCT OPTIC NERVE(OCULAR COHERENCE TOMOGRAPHY) - OU - BOTH EYES: ICD-10-PCS | Mod: S$GLB,,, | Performed by: OPHTHALMOLOGY

## 2019-03-18 PROCEDURE — 92014 COMPRE OPH EXAM EST PT 1/>: CPT | Mod: S$GLB,,, | Performed by: OPHTHALMOLOGY

## 2019-03-18 PROCEDURE — 92133 CPTRZD OPH DX IMG PST SGM ON: CPT | Mod: S$GLB,,, | Performed by: OPHTHALMOLOGY

## 2019-03-18 PROCEDURE — 92014 PR EYE EXAM, EST PATIENT,COMPREHESV: ICD-10-PCS | Mod: S$GLB,,, | Performed by: OPHTHALMOLOGY

## 2019-03-18 PROCEDURE — 99999 PR PBB SHADOW E&M-EST. PATIENT-LVL II: ICD-10-PCS | Mod: PBBFAC,,, | Performed by: OPHTHALMOLOGY

## 2019-03-18 PROCEDURE — 92083 EXTENDED VISUAL FIELD XM: CPT | Mod: S$GLB,,, | Performed by: OPHTHALMOLOGY

## 2019-03-18 PROCEDURE — 92083 HUMPHREY VISUAL FIELD - OU - BOTH EYES: ICD-10-PCS | Mod: S$GLB,,, | Performed by: OPHTHALMOLOGY

## 2019-03-21 ENCOUNTER — PES CALL (OUTPATIENT)
Dept: ADMINISTRATIVE | Facility: CLINIC | Age: 59
End: 2019-03-21

## 2019-04-03 NOTE — PROGRESS NOTES
Assessment /Plan     For exam results, see Encounter Report.    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 Uveitic glaucoma - Both Eyes OD>OS  2/2 sarcoidosis  Old pt of Dr. Wagner and Dr. Menard  First HVF 2012  First photos 2011     Family history   neg  Glaucoma meds   Off all drops and diamox 2013 to 2014  (pt lost insurance)(( had +++ progression off gtts)                              back on gtts combigan/azopt OU and latanoprost OS  H/O adverse rxn to glaucoma drops  None  LASERS   none  GLAUCOMA SURGERIES   Trab OD (3/20/ 2010 -Derian), Bleb Revision OD  3/21/2012 Loft// complex phaco/Baerveldt od 9/24/2014  OTHER EYE SURGERIES   PPV/Endo O5, CE OS (04)  CDR  0.95/0.5  Tbase  ?    Tmax  36/22 on meds      Ttarget   16/20       HVF  10-2 stim III OD (3/5/12)-sup arc,inf alt, tiny residual island OD; 24-2 OS w/ marked general depression             3/4/2013 ---HVF 10-2 stim III OD - SAD / Inf Alt los s            OD - 6 test 10-2 stim V - 2014 to 2019 - dense IAD / dense SAD - tiny central  island - stable             OS - 5 test [id number]  24-2 - stim V OS  // Inf Alt loss/SAD (3/2019 - 10-2 stim V done by mistake)     Gonio -  PAS OD - 360 // OS -  f    CCT   "525/531  OCT  6 test 2011 to 2019   - dec. S/I/N od // Dec S/N/I   HRT  7 test 2012 to 04/2017 - MR -  Dec. S na I/N od // xx os /// CDR 0.79 od // xx os  Disc photos  2011, 2013, 2014, 2015, 2018   - OIS     Ttoday 12/13  ((down from 23/16 - now off steroid gtts od ))  Test done today -yag cap od    2. Secondary iritis - Both Eyes      3. 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 Postinflammatory optic atrophy - Left Eye   -Sarcoid, + APD, uncertain etiology     5. Relative afferent pupillary defect OS     6. Steroid responders borderline glaucoma - Both Eyes -increased IOP w/ Pred Acetate. Does better w/ Vexol     7. Legal blindness - Both EyeS OD based on VF loss and OS based on [facility].     8. Post-operative state - Both Eyes   -s/p bleb revision OD (3/21/12) had extensive subconj fibrosis, but eventually has done well.     9 Pseudophakic OD  S/P complex phaco/IOL / baerveldt od 9/24/2014   PC IOL os - done years ago - ? [facility] - ? IOL sewn in        Plan:  Uveitic glaucoma  Angle closure - PAS ou   Lost to F/U for 10 months from 9/2013 to 7/2014  - [facility] went  from 20/40 to LP  Was already CF at 3 ft os- 2/2 ON pallor from sarcoid  IOP OD was 64 - off drops since 9/2013 - when re-presented     Pt is "legally blind" - he may want to apply for disability.    Pt previously had issues with insurance, but now has new insurance.  Discussed with patient that the vision loss od is irreversible 2/2 glaucoma and the importance of compliance with gtt/meds.        Pt C/O cont decrease in vision   -very dense PCO now   -rec yag to PCO od  - but this will likely trigger a big flare up of the iritis   -will use durezol and ? Medrol dose pack to try and control iritis post yag cap     Developed  anterior chamber fibrin OD  Post GDD  - it has has resolved - but prone to iritis with any intervention     Pt has seen the pulmonologist  Has seen an internist   Did not see rheumatology - but they can be consulted if steroid " sparing immunosuppression is needed to control the iritis // saw markfrancy 11/9/2012    Was  on MTX for sarcoid and iritis - off - stopped on his own - seems to be doing ok (since 5/30/2018)  No increase in inflammationn od on 7/30/2018 // still ok off steroid gtts 11/12/2018    Cont combigan  bid ou  Cont PG's (travatan ) OS only // switch to latanoprost os q hs - for insurance reasons   - cont  dorzolamide OU -  twice daily ou --> educated patient to use in BOTH eyes (states he only uses OD)    Complains of blurry vision OS that is worsening --? Band keratopathy? Can refer for removal w/ cornea   - seen by cristine 5/2/2017 - not felt to be VS     CSM - glaucoma gtts   Stay off steroid gtts for now      yag cap od - 4/4/2019 - durezol taper - decrease weekly   Medrol dose pack   Cont all glaucoma gtts the same     F/U 2-3 weeks to check on iritis post yag cap and vision     Pt is getting training (first step[facility] - says it is going well.- uses a mobility cane

## 2019-04-04 ENCOUNTER — OFFICE VISIT (OUTPATIENT)
Dept: OPHTHALMOLOGY | Facility: CLINIC | Age: 59
End: 2019-04-04
Payer: MEDICARE

## 2019-04-04 VITALS — DIASTOLIC BLOOD PRESSURE: 90 MMHG | SYSTOLIC BLOOD PRESSURE: 149 MMHG | HEART RATE: 81 BPM

## 2019-04-04 DIAGNOSIS — H57.09 RELATIVE AFFERENT PUPILLARY DEFECT: ICD-10-CM

## 2019-04-04 DIAGNOSIS — H18.422 BAND KERATOPATHY, LEFT: ICD-10-CM

## 2019-04-04 DIAGNOSIS — H54.8 LEGAL BLINDNESS: ICD-10-CM

## 2019-04-04 DIAGNOSIS — D86.83 IRIDOCYCLITIS DUE TO SARCOIDOSIS: ICD-10-CM

## 2019-04-04 DIAGNOSIS — H21.523 PAS (PERIPH ANTERIOR SYNECHIAE), BILATERAL: ICD-10-CM

## 2019-04-04 DIAGNOSIS — H20.9 UVEITIC GLAUCOMA, BILATERAL, SEVERE STAGE: ICD-10-CM

## 2019-04-04 DIAGNOSIS — H40.043 STEROID RESPONDERS BORDERLINE GLAUCOMA, BILATERAL: ICD-10-CM

## 2019-04-04 DIAGNOSIS — H47.292 POSTINFLAMMATORY OPTIC ATROPHY, LEFT: ICD-10-CM

## 2019-04-04 DIAGNOSIS — H40.43X3 UVEITIC GLAUCOMA, BILATERAL, SEVERE STAGE: ICD-10-CM

## 2019-04-04 DIAGNOSIS — Z96.1 PSEUDOPHAKIA OF BOTH EYES: ICD-10-CM

## 2019-04-04 DIAGNOSIS — H26.491 POSTERIOR CAPSULAR OPACIFICATION VISUALLY SIGNIFICANT, RIGHT EYE: Primary | ICD-10-CM

## 2019-04-04 PROCEDURE — 66821 YAG CAPSULOTOMY - OD - RIGHT EYE: ICD-10-PCS | Mod: RT,S$GLB,, | Performed by: OPHTHALMOLOGY

## 2019-04-04 PROCEDURE — 66821 AFTER CATARACT LASER SURGERY: CPT | Mod: RT,S$GLB,, | Performed by: OPHTHALMOLOGY

## 2019-04-04 PROCEDURE — 99999 PR PBB SHADOW E&M-EST. PATIENT-LVL II: ICD-10-PCS | Mod: PBBFAC,,, | Performed by: OPHTHALMOLOGY

## 2019-04-04 PROCEDURE — 99999 PR PBB SHADOW E&M-EST. PATIENT-LVL II: CPT | Mod: PBBFAC,,, | Performed by: OPHTHALMOLOGY

## 2019-04-04 PROCEDURE — 99499 NO LOS: ICD-10-PCS | Mod: S$GLB,,, | Performed by: OPHTHALMOLOGY

## 2019-04-04 PROCEDURE — 99499 UNLISTED E&M SERVICE: CPT | Mod: S$GLB,,, | Performed by: OPHTHALMOLOGY

## 2019-04-04 RX ORDER — METHYLPREDNISOLONE 4 MG/1
TABLET ORAL
Qty: 1 PACKAGE | Refills: 0 | Status: SHIPPED | OUTPATIENT
Start: 2019-04-04 | End: 2019-05-08 | Stop reason: ALTCHOICE

## 2019-04-04 RX ORDER — DIFLUPREDNATE OPHTHALMIC 0.5 MG/ML
1 EMULSION OPHTHALMIC 3 TIMES DAILY
Qty: 5 ML | Refills: 0 | Status: SHIPPED | OUTPATIENT
Start: 2019-04-04 | End: 2019-11-25

## 2019-04-22 RX ORDER — AMLODIPINE BESYLATE 10 MG/1
TABLET ORAL
Qty: 90 TABLET | Refills: 3 | Status: SHIPPED | OUTPATIENT
Start: 2019-04-22 | End: 2019-11-25 | Stop reason: SDUPTHER

## 2019-04-25 NOTE — PROGRESS NOTES
Assessment /Plan     For exam results, see Encounter Report.    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 Uveitic glaucoma - Both Eyes OD>OS  2/2 sarcoidosis  Old pt of Dr. Wagner and Dr. Menard  First HVF 2012  First photos 2011     Family history   neg  Glaucoma meds   Off all drops and diamox 2013 to 2014  (pt lost insurance)(( had +++ progression off gtts)                              back on gtts combigan/azopt OU and latanoprost OS  H/O adverse rxn to glaucoma drops  None  LASERS   none  GLAUCOMA SURGERIES   Trab OD (3/20/ 2010 -Derian), Bleb Revision OD  3/21/2012 Loft// complex phaco/Baerveldt od 9/24/2014  OTHER EYE SURGERIES   PPV/Endo O5, CE OS (04)  CDR  0.95/0.5  Tbase  ?    Tmax  36/22 on meds      Ttarget   16/20       HVF  10-2 stim III OD (3/5/12)-sup arc,inf alt, tiny residual island OD; 24-2 OS w/ marked general depression             3/4/2013 ---HVF 10-2 stim III OD - SAD / Inf Alt los s            OD - 6 test 10-2 stim V - 2014 to 2019 - dense IAD / dense SAD - tiny central  island - stable             OS - 5 test [id number]  24-2 - stim V OS  // Inf Alt loss/SAD (3/2019 - 10-2 stim V done by mistake)     Gonio -  PAS OD - 360 // OS -  f    CCT   "525/531  OCT  6 test 2011 to 2019   - dec. S/I/N od // Dec S/N/I   HRT  7 test 2012 to 04/2017 - MR -  Dec. Sna I/N od // xx os /// CDR 0.79 od // xx os  Disc photos  2011, 2013, 2014, 2015, 2018   - OIS     Ttoday 10/14  ((down from 23/16 - now off steroid gtts od ))  Test done today -F/U yag cap od - monitoring for worsening / persistent iritis    2. Secondary iritis - Both Eyes      3. 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 Postinflammatory optic atrophy - Left Eye   -Sarcoid, + APD, uncertain etiology     5. Relative afferent pupillary defect OS     6. Steroid responders borderline glaucoma - Both Eyes -increased IOP w/ Pred Acetate. Does better w/ Vexol     7. Legal blindness - Both EyeS OD based on VF loss and OS based on [facility].     8. Post-operative state - Both Eyes   -s/p bleb revision OD (3/21/12) had extensive subconj fibrosis, but eventually has done well.     9 Pseudophakic OD  S/P complex phaco/IOL / baerveldt od 9/24/2014   PC IOL os - done years ago - ? [facility] - ? IOL sewn in        Plan:  Uveitic glaucoma  Angle closure - PAS ou   Lost to F/U for 10 months from 9/2013 to 7/2014  - [facility] od went  from 20/40 to LP  Was already CF at 3 ft os- 2/2 ON pallor from sarcoid  IOP OD was 64 - off drops since 9/2013 - when re-presented     Pt is "legally blind" - he may want to apply for disability.    Pt previously had issues with insurance, but now has new insurance.  Discussed with patient that the vision loss od is irreversible 2/2 glaucoma and the importance of compliance with gtt/meds.        Pt C/O cont decrease in vision   -very dense PCO now   -rec yag to PCO od  - but this will likely trigger a big flare up of the iritis   -will use durezol and ? Medrol dose pack to try and control iritis post yag cap     Developed  anterior chamber fibrin OD  Post GDD  - it has has resolved - but prone to iritis with any intervention     Pt has seen the pulmonologist  Has seen an internist   Did not see " rheumatology - but they can be consulted if steroid sparing immunosuppression is needed to control the iritis // saw mark-claudia 11/9/2012    Was  on MTX for sarcoid and iritis - off - stopped on his own - seems to be doing ok (since 5/30/2018)  No increase in inflammationn od on 7/30/2018 // still ok off steroid gtts 11/12/2018    Cont combigan  bid ou  Cont PG's (travatan ) OS only // switch to latanoprost os q hs - for insurance reasons   - cont  dorzolamide OU -  twice daily ou --> educated patient to use in BOTH eyes (states he only uses OD)    Complains of blurry vision OS that is worsening --? Band keratopathy? Can refer for removal w/ cornea   - seen by cristine 5/2/2017 - not felt to be VS     CSM - glaucoma gtts   Stay off steroid gtts for now      yag cap od - 4/4/2019 - durezol  Use 1 x day for 3 more weeks then stop - was off steroid gtts completely pre - yag cap    Cont all glaucoma gtts the same     Dry eyes   Gel gtts q is os and ointment q hs os     F/U 3-4 months - update flow sheet with yag cap od  4/4/2019     Pt is getting training (first step) at the [facility] - says it is going well.- uses a mobility cane

## 2019-04-29 ENCOUNTER — OFFICE VISIT (OUTPATIENT)
Dept: OPHTHALMOLOGY | Facility: CLINIC | Age: 59
End: 2019-04-29
Payer: MEDICARE

## 2019-04-29 DIAGNOSIS — H47.292 POSTINFLAMMATORY OPTIC ATROPHY, LEFT: ICD-10-CM

## 2019-04-29 DIAGNOSIS — H40.43X3 UVEITIC GLAUCOMA, BILATERAL, SEVERE STAGE: Primary | ICD-10-CM

## 2019-04-29 DIAGNOSIS — H57.09 RELATIVE AFFERENT PUPILLARY DEFECT: ICD-10-CM

## 2019-04-29 DIAGNOSIS — H40.043 STEROID RESPONDERS BORDERLINE GLAUCOMA, BILATERAL: ICD-10-CM

## 2019-04-29 DIAGNOSIS — H20.9 UVEITIC GLAUCOMA, BILATERAL, SEVERE STAGE: Primary | ICD-10-CM

## 2019-04-29 DIAGNOSIS — H21.523 PAS (PERIPH ANTERIOR SYNECHIAE), BILATERAL: ICD-10-CM

## 2019-04-29 DIAGNOSIS — H18.422 BAND KERATOPATHY, LEFT: ICD-10-CM

## 2019-04-29 DIAGNOSIS — H54.8 LEGAL BLINDNESS: ICD-10-CM

## 2019-04-29 DIAGNOSIS — Z96.1 PSEUDOPHAKIA OF BOTH EYES: ICD-10-CM

## 2019-04-29 DIAGNOSIS — D86.83 IRIDOCYCLITIS DUE TO SARCOIDOSIS: ICD-10-CM

## 2019-04-29 DIAGNOSIS — H26.491 POSTERIOR CAPSULAR OPACIFICATION VISUALLY SIGNIFICANT, RIGHT EYE: ICD-10-CM

## 2019-04-29 PROCEDURE — 99999 PR PBB SHADOW E&M-EST. PATIENT-LVL II: ICD-10-PCS | Mod: PBBFAC,,, | Performed by: OPHTHALMOLOGY

## 2019-04-29 PROCEDURE — 92012 PR EYE EXAM, EST PATIENT,INTERMED: ICD-10-PCS | Mod: S$GLB,,, | Performed by: OPHTHALMOLOGY

## 2019-04-29 PROCEDURE — 99999 PR PBB SHADOW E&M-EST. PATIENT-LVL II: CPT | Mod: PBBFAC,,, | Performed by: OPHTHALMOLOGY

## 2019-04-29 PROCEDURE — 99499 UNLISTED E&M SERVICE: CPT | Mod: S$GLB,,, | Performed by: OPHTHALMOLOGY

## 2019-04-29 PROCEDURE — 99499 RISK ADDL DX/OHS AUDIT: ICD-10-PCS | Mod: S$GLB,,, | Performed by: OPHTHALMOLOGY

## 2019-04-29 PROCEDURE — 92012 INTRM OPH EXAM EST PATIENT: CPT | Mod: S$GLB,,, | Performed by: OPHTHALMOLOGY

## 2019-05-08 ENCOUNTER — OFFICE VISIT (OUTPATIENT)
Dept: INTERNAL MEDICINE | Facility: CLINIC | Age: 59
End: 2019-05-08
Payer: MEDICARE

## 2019-05-08 VITALS
HEART RATE: 51 BPM | OXYGEN SATURATION: 99 % | SYSTOLIC BLOOD PRESSURE: 112 MMHG | HEIGHT: 69 IN | BODY MASS INDEX: 27.33 KG/M2 | DIASTOLIC BLOOD PRESSURE: 70 MMHG | WEIGHT: 184.5 LBS

## 2019-05-08 DIAGNOSIS — D86.83 IRIDOCYCLITIS DUE TO SARCOIDOSIS: ICD-10-CM

## 2019-05-08 DIAGNOSIS — D86.0 SARCOIDOSIS OF LUNG: ICD-10-CM

## 2019-05-08 DIAGNOSIS — H54.8 LEGAL BLINDNESS: ICD-10-CM

## 2019-05-08 DIAGNOSIS — E78.5 HYPERLIPIDEMIA, UNSPECIFIED HYPERLIPIDEMIA TYPE: ICD-10-CM

## 2019-05-08 DIAGNOSIS — Z23 NEED FOR PROPHYLACTIC VACCINATION AGAINST STREPTOCOCCUS PNEUMONIAE (PNEUMOCOCCUS) AND INFLUENZA: ICD-10-CM

## 2019-05-08 DIAGNOSIS — I10 HYPERTENSION, UNSPECIFIED TYPE: ICD-10-CM

## 2019-05-08 DIAGNOSIS — Z00.00 ENCOUNTER FOR PREVENTIVE HEALTH EXAMINATION: Primary | ICD-10-CM

## 2019-05-08 DIAGNOSIS — H20.9 UVEITIC GLAUCOMA, UNSPECIFIED GLAUCOMA STAGE, UNSPECIFIED LATERALITY: ICD-10-CM

## 2019-05-08 DIAGNOSIS — H40.40X0 UVEITIC GLAUCOMA, UNSPECIFIED GLAUCOMA STAGE, UNSPECIFIED LATERALITY: ICD-10-CM

## 2019-05-08 PROCEDURE — G0009 PNEUMOCOCCAL POLYSACCHARIDE VACCINE 23-VALENT =>2YO SQ IM: ICD-10-PCS | Mod: S$GLB,,, | Performed by: NURSE PRACTITIONER

## 2019-05-08 PROCEDURE — 3078F PR MOST RECENT DIASTOLIC BLOOD PRESSURE < 80 MM HG: ICD-10-PCS | Mod: CPTII,S$GLB,, | Performed by: NURSE PRACTITIONER

## 2019-05-08 PROCEDURE — 99999 PR PBB SHADOW E&M-EST. PATIENT-LVL IV: CPT | Mod: PBBFAC,,, | Performed by: NURSE PRACTITIONER

## 2019-05-08 PROCEDURE — 99499 RISK ADDL DX/OHS AUDIT: ICD-10-PCS | Mod: S$GLB,,, | Performed by: NURSE PRACTITIONER

## 2019-05-08 PROCEDURE — 90732 PPSV23 VACC 2 YRS+ SUBQ/IM: CPT | Mod: 59,S$GLB,, | Performed by: NURSE PRACTITIONER

## 2019-05-08 PROCEDURE — G0009 ADMIN PNEUMOCOCCAL VACCINE: HCPCS | Mod: S$GLB,,, | Performed by: NURSE PRACTITIONER

## 2019-05-08 PROCEDURE — 99499 UNLISTED E&M SERVICE: CPT | Mod: S$GLB,,, | Performed by: NURSE PRACTITIONER

## 2019-05-08 PROCEDURE — 99999 PR PBB SHADOW E&M-EST. PATIENT-LVL IV: ICD-10-PCS | Mod: PBBFAC,,, | Performed by: NURSE PRACTITIONER

## 2019-05-08 PROCEDURE — G0439 PR MEDICARE ANNUAL WELLNESS SUBSEQUENT VISIT: ICD-10-PCS | Mod: S$GLB,,, | Performed by: NURSE PRACTITIONER

## 2019-05-08 PROCEDURE — 90732 PNEUMOCOCCAL POLYSACCHARIDE VACCINE 23-VALENT =>2YO SQ IM: ICD-10-PCS | Mod: 59,S$GLB,, | Performed by: NURSE PRACTITIONER

## 2019-05-08 PROCEDURE — 3078F DIAST BP <80 MM HG: CPT | Mod: CPTII,S$GLB,, | Performed by: NURSE PRACTITIONER

## 2019-05-08 PROCEDURE — 3074F PR MOST RECENT SYSTOLIC BLOOD PRESSURE < 130 MM HG: ICD-10-PCS | Mod: CPTII,S$GLB,, | Performed by: NURSE PRACTITIONER

## 2019-05-08 PROCEDURE — 3074F SYST BP LT 130 MM HG: CPT | Mod: CPTII,S$GLB,, | Performed by: NURSE PRACTITIONER

## 2019-05-08 PROCEDURE — G0439 PPPS, SUBSEQ VISIT: HCPCS | Mod: S$GLB,,, | Performed by: NURSE PRACTITIONER

## 2019-05-08 NOTE — PROGRESS NOTES
"Jaguar Lackey presented for a  Medicare AWV and comprehensive Health Risk Assessment today. The following components were reviewed and updated:    · Medical history  · Family History  · Social history  · Allergies and Current Medications  · Health Risk Assessment  · Health Maintenance  · Care Team     ** See Completed Assessments for Annual Wellness Visit within the encounter summary.**       The following assessments were completed:  · Living Situation  · CAGE  · Depression Screening  · Timed Get Up and Go  · Whisper Test  · Cognitive Function Screening  · Nutrition Screening  · ADL Screening  · PAQ Screening    I offered to discuss end of life issues, including information on how to make advance directives that the patient could use to name someone who would make medical decisions on their behalf if they became too ill to make themselves.    ___Patient declined  _X_Patient is interested, I provided paper work and offered to discuss.    Vitals:    05/08/19 0735   BP: 112/70   Pulse: (!) 51   SpO2: 99%   Weight: 83.7 kg (184 lb 8.4 oz)   Height: 5' 9" (1.753 m)     Body mass index is 27.25 kg/m².  Physical Exam   Constitutional: He is oriented to person, place, and time. He appears well-developed and well-nourished. No distress.   Using cane for vision assitance   HENT:   Head: Normocephalic and atraumatic.   Eyes: No scleral icterus.   Neck: Normal range of motion. Neck supple.   Cardiovascular: Regular rhythm, S1 normal, S2 normal and normal heart sounds. Bradycardia present. Exam reveals no gallop and no friction rub.   No murmur heard.  Pulmonary/Chest: Effort normal and breath sounds normal. No stridor. No respiratory distress. He has no wheezes. He has no rales.   Musculoskeletal: He exhibits no edema.   Neurological: He is alert and oriented to person, place, and time.   Skin: Skin is warm and dry. He is not diaphoretic.   Psychiatric: His behavior is normal.   Nursing note and vitals reviewed.    "     Diagnoses and health risks identified today and associated recommendations/orders:    1. Encounter for preventive health examination  - Screenings performed, as noted above. Personal preventative testing needs reviewed.     2. Need for prophylactic vaccination against Streptococcus pneumoniae (pneumococcus) and influenza  - Pneumococcal polysaccharide vaccine 23-valent greater than or equal to 1yo subcutaneous/IM    3. Uveitic glaucoma, unspecified glaucoma stage, unspecified laterality  - Stable, followed by ophthalmology    4. Legal blindness - Both Eyes  - Stable, followed by ophthalmology    5. 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 Sarcoidosis of lung  - Stable, followed by pulmonology and rheumatology    7. Hyperlipidemia, unspecified hyperlipidemia type  - Controlled, followed by PCP    8. Hypertension, unspecified type  - Controlled, followed by PCP    Provided Wilton with a 5-10 year written screening schedule and personal prevention plan. Recommendations were developed using the USPSTF age appropriate recommendations. Education, counseling, and referrals were provided as needed. After Visit Summary printed and given to patient which includes a list of additional screenings\tests needed.    Follow up in about 1 year (around 5/8/2020) for your next annual wellness visit.    Leann Roque NP

## 2019-05-08 NOTE — PATIENT INSTRUCTIONS
Counseling and Referral of Other Preventative  (Italic type indicates deductible and co-insurance are waived)    Patient Name: Jaguar Lackey  Today's Date: 5/8/2019    Health Maintenance       Date Due Completion Date    Pneumococcal Vaccine (Medium Risk) (1 of 1 - PPSV23) 02/13/1979 ---    Influenza Vaccine 08/01/2019 11/28/2016    PROSTATE-SPECIFIC ANTIGEN 09/10/2019 9/10/2018    Colonoscopy 12/05/2022 12/5/2012    Lipid Panel 04/02/2023 4/2/2018    TETANUS VACCINE 11/28/2026 11/28/2016        Orders Placed This Encounter   Procedures    Pneumococcal polysaccharide vaccine 23-valent greater than or equal to 1yo subcutaneous/IM     The following information is provided to all patients.  This information is to help you find resources for any of the problems found today that may be affecting your health:                Living healthy guide: www.[facility].[place].gov      Understanding Diabetes: www.diabetes.org      Eating healthy: www.cdc.gov/healthyweight      CDC home safety checklist: www.cdc.gov/steadi/patient.html      Agency on Aging: www.goea.[place].[facility]      Alcoholics anonymous (AA): www.aa.org      Physical Activity: www.rios.nih.gov/np0oazn      Tobacco use: www.quitwithusla.org

## 2019-05-08 NOTE — LETTER
May 8, 2019                   [facility]                                                                                                                                                       Internal Medicine  [address]  [place] [address]  Phone: [phone]  Fax: [phone]   May 8, 2019     Patient: Jaguar Lackey   YOB: 1960   Date of Visit: 5/8/2019       To Whom it May Concern:    Jaguar Lackey was seen in my clinic on 5/8/2019. He may return to work on 5/8/19. Please excuse his absence earlier today. .    If you have any questions or concerns, please don't hesitate to call.    Sincerely,         Leann Roque, NP

## 2019-07-11 NOTE — PROGRESS NOTES
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 (History)              Assessment /Plan     For exam results, see Encounter Report.    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 Uveitic glaucoma - Both Eyes OD>OS  2/2 sarcoidosis  Old pt of Dr. Wagner and Dr. Menard  First HVF 2012  First photos 2011     Family history   neg  Glaucoma meds   Off all drops and diamox 2013 to 2014  (pt lost insurance)(( had +++ progression off gtts)                              back on gtts combigan/azopt OU and latanoprost OS  H/O adverse rxn to glaucoma drops  None  LASERS   none  GLAUCOMA SURGERIES   Trab OD (3/20/ 2010 -Derian), Bleb Revision OD  3/21/2012 Loft// complex phaco/Baerveldt od 9/24/2014  OTHER EYE SURGERIES   PPV/Endo O5, CE OS (04)  CDR  0.95/0.5  Tbase  ?    Tmax  36/22 on meds      Ttarget   16/20       HVF  10-2 stim III OD (3/5/12)-sup arc,inf alt, tiny residual island OD; 24-2 OS w/ marked general depression              "3/4/2013 ---HVF 10-2 stim III OD - SAD / Inf Alt los s            OD - 6 test 10-2 stim V - 2014 to 2019 - dense IAD / dense SAD - tiny central  island - stable             OS - 5 test 4647-9507  24-2 - stim V OS  // Inf Alt loss/SAD (3/2019 - 10-2 stim V done by mistake)     Gonio -  PAS OD - 360 // OS -  f    CCT  525/531  OCT  6 test 2011 to 2019   - dec. S/I/N od // Dec S/N/I   HRT  7 test 2012 to 04/2017 - MR -  Dec. S, bord I/N od // xx os /// CDR 0.79 od // xx os  Disc photos  2011, 2013, 2014, 2015, 2018   - OIS     Ttoday 14/14  ((down from 23/16 - now off steroid gtts od ))  Test done today -F/U yag cap od - monitoring for worsening / persistent iritis    2. Secondary iritis - Both Eyes      3. 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 Postinflammatory optic atrophy - Left Eye   -Sarcoid, + APD, uncertain etiology     5. Relative afferent pupillary defect OS     6. Steroid responders borderline glaucoma - Both Eyes -increased IOP w/ Pred Acetate. Does better w/ Vexol     7. Legal blindness - Both EyeS OD based on VF loss and OS based on VA.     8. Post-operative state - Both Eyes   -s/p bleb revision OD (3/21/12) had extensive subconj fibrosis, but eventually has done well.     9 Pseudophakic OD  S/P complex phaco/IOL / baerveldt od 9/24/2014   PC IOL os - done years ago - ? [facility] - ? IOL sewn in        Plan:  Uveitic glaucoma  Angle closure - PAS ou   Lost to F/U for 10 months from 9/2013 to 7/2014  - VA od went  from 20/40 to LP  Was already CF at 3 ft os- 2/2 ON pallor from sarcoid  IOP OD was 64 - off drops since 9/2013 - when re-presented     Pt is "legally blind" - he may want to apply for disability.    Pt previously had issues with insurance, but now has new insurance.  Discussed with patient that the vision loss od is irreversible 2/2 glaucoma and the importance of compliance with gtt/meds.        Pt C/O cont decrease in vision   -very dense PCO now   -rec yag to PCO od  - but " this will likely trigger a big flare up of the iritis   -will use durezol and ? Medrol dose pack to try and control iritis post yag cap     Developed  anterior chamber fibrin OD  Post GDD  - it has has resolved - but prone to iritis with any intervention     Pt has seen the pulmonologist  Has seen an internist   Did not see rheumatology - but they can be consulted if steroid sparing immunosuppression is needed to control the iritis // saw mark-claudia 11/9/2012    Was  on MTX for sarcoid and iritis - off - stopped on his own - seems to be doing ok (since 5/30/2018)  No increase in inflammationn od on 7/30/2018 // still ok off steroid gtts 11/12/2018    Cont combigan  bid ou  Cont PG's (travatan ) OS only // switch to latanoprost os q hs - for insurance reasons   - cont  dorzolamide OU -  twice daily ou --> educated patient to use in BOTH eyes (states he only uses OD)    Complains of blurry vision OS that is worsening --? Band keratopathy? Can refer for removal w/ cornea   - seen by cristine 5/2/2017 - not felt to be VS     CSM - glaucoma gtts   Stay off steroid gtts for now      yag cap od - 4/4/2019 - durezol  Use 1 x day for 3 more weeks then stop - was off steroid gtts completely pre - yag cap    Cont all glaucoma gtts the same     Dry eyes   Gel gtts q is os and ointment q hs os     F/U 4 months with HRT - od     Pt is getting training (first step) at the [facility] - says it is going well.- uses a mobility cane

## 2019-07-12 ENCOUNTER — OFFICE VISIT (OUTPATIENT)
Dept: OPHTHALMOLOGY | Facility: CLINIC | Age: 59
End: 2019-07-12
Payer: MEDICARE

## 2019-07-12 DIAGNOSIS — H57.09 RELATIVE AFFERENT PUPILLARY DEFECT: ICD-10-CM

## 2019-07-12 DIAGNOSIS — D86.83 IRIDOCYCLITIS DUE TO SARCOIDOSIS: ICD-10-CM

## 2019-07-12 DIAGNOSIS — H21.523 PAS (PERIPH ANTERIOR SYNECHIAE), BILATERAL: ICD-10-CM

## 2019-07-12 DIAGNOSIS — H40.43X3 UVEITIC GLAUCOMA, BILATERAL, SEVERE STAGE: Primary | ICD-10-CM

## 2019-07-12 DIAGNOSIS — H20.9 UVEITIC GLAUCOMA, BILATERAL, SEVERE STAGE: Primary | ICD-10-CM

## 2019-07-12 DIAGNOSIS — H54.8 LEGAL BLINDNESS: ICD-10-CM

## 2019-07-12 DIAGNOSIS — H18.422 BAND KERATOPATHY, LEFT: ICD-10-CM

## 2019-07-12 DIAGNOSIS — Z96.1 PSEUDOPHAKIA OF BOTH EYES: ICD-10-CM

## 2019-07-12 DIAGNOSIS — H47.292 POSTINFLAMMATORY OPTIC ATROPHY, LEFT: ICD-10-CM

## 2019-07-12 DIAGNOSIS — H40.043 STEROID RESPONDERS BORDERLINE GLAUCOMA, BILATERAL: ICD-10-CM

## 2019-07-12 PROCEDURE — 92012 PR EYE EXAM, EST PATIENT,INTERMED: ICD-10-PCS | Mod: S$GLB,,, | Performed by: OPHTHALMOLOGY

## 2019-07-12 PROCEDURE — 99499 RISK ADDL DX/OHS AUDIT: ICD-10-PCS | Mod: S$GLB,,, | Performed by: OPHTHALMOLOGY

## 2019-07-12 PROCEDURE — 99499 UNLISTED E&M SERVICE: CPT | Mod: S$GLB,,, | Performed by: OPHTHALMOLOGY

## 2019-07-12 PROCEDURE — 99999 PR PBB SHADOW E&M-EST. PATIENT-LVL II: ICD-10-PCS | Mod: PBBFAC,,, | Performed by: OPHTHALMOLOGY

## 2019-07-12 PROCEDURE — 99999 PR PBB SHADOW E&M-EST. PATIENT-LVL II: CPT | Mod: PBBFAC,,, | Performed by: OPHTHALMOLOGY

## 2019-07-12 PROCEDURE — 92012 INTRM OPH EXAM EST PATIENT: CPT | Mod: S$GLB,,, | Performed by: OPHTHALMOLOGY

## 2019-07-18 DIAGNOSIS — H20.9 UVEITIC GLAUCOMA, BILATERAL, SEVERE STAGE: ICD-10-CM

## 2019-07-18 DIAGNOSIS — H40.43X3 UVEITIC GLAUCOMA, BILATERAL, SEVERE STAGE: ICD-10-CM

## 2019-07-18 RX ORDER — LATANOPROST 50 UG/ML
1 SOLUTION/ DROPS OPHTHALMIC NIGHTLY
Qty: 2.5 ML | Refills: 12 | Status: SHIPPED | OUTPATIENT
Start: 2019-07-18 | End: 2019-11-25 | Stop reason: SDUPTHER

## 2019-07-29 RX ORDER — DORZOLAMIDE HCL 20 MG/ML
SOLUTION/ DROPS OPHTHALMIC
Qty: 30 ML | Refills: 4 | Status: SHIPPED | OUTPATIENT
Start: 2019-07-29 | End: 2019-11-25 | Stop reason: SDUPTHER

## 2019-09-18 DIAGNOSIS — H40.43X3 UVEITIC GLAUCOMA OF BOTH EYES, SEVERE STAGE: ICD-10-CM

## 2019-09-18 DIAGNOSIS — H20.9 UVEITIC GLAUCOMA OF BOTH EYES, SEVERE STAGE: ICD-10-CM

## 2019-09-19 RX ORDER — BRIMONIDINE TARTRATE, TIMOLOL MALEATE 2; 5 MG/ML; MG/ML
SOLUTION/ DROPS OPHTHALMIC
Qty: 5 ML | Refills: 12 | Status: SHIPPED | OUTPATIENT
Start: 2019-09-19 | End: 2019-11-25 | Stop reason: SDUPTHER

## 2019-10-08 RX ORDER — ERGOCALCIFEROL 1.25 MG/1
CAPSULE ORAL
Qty: 12 CAPSULE | Refills: 8 | Status: SHIPPED | OUTPATIENT
Start: 2019-10-08 | End: 2019-11-25 | Stop reason: SDUPTHER

## 2019-10-28 RX ORDER — PRAVASTATIN SODIUM 40 MG/1
40 TABLET ORAL DAILY
Qty: 90 TABLET | Refills: 1 | Status: SHIPPED | OUTPATIENT
Start: 2019-10-28 | End: 2019-11-25 | Stop reason: SDUPTHER

## 2019-11-11 ENCOUNTER — PATIENT OUTREACH (OUTPATIENT)
Dept: ADMINISTRATIVE | Facility: HOSPITAL | Age: 59
End: 2019-11-11

## 2019-11-11 DIAGNOSIS — E78.5 HYPERLIPIDEMIA, UNSPECIFIED HYPERLIPIDEMIA TYPE: Primary | ICD-10-CM

## 2019-11-11 DIAGNOSIS — Z12.5 ENCOUNTER FOR PROSTATE CANCER SCREENING: ICD-10-CM

## 2019-11-23 ENCOUNTER — PATIENT OUTREACH (OUTPATIENT)
Dept: ADMINISTRATIVE | Facility: OTHER | Age: 59
End: 2019-11-23

## 2019-11-25 ENCOUNTER — OFFICE VISIT (OUTPATIENT)
Dept: OPHTHALMOLOGY | Facility: CLINIC | Age: 59
End: 2019-11-25
Payer: MEDICARE

## 2019-11-25 ENCOUNTER — IMMUNIZATION (OUTPATIENT)
Dept: INTERNAL MEDICINE | Facility: CLINIC | Age: 59
End: 2019-11-25
Payer: MEDICARE

## 2019-11-25 ENCOUNTER — OFFICE VISIT (OUTPATIENT)
Dept: INTERNAL MEDICINE | Facility: CLINIC | Age: 59
End: 2019-11-25
Payer: MEDICARE

## 2019-11-25 VITALS
DIASTOLIC BLOOD PRESSURE: 80 MMHG | HEART RATE: 59 BPM | HEIGHT: 69 IN | OXYGEN SATURATION: 96 % | SYSTOLIC BLOOD PRESSURE: 132 MMHG | WEIGHT: 186.06 LBS | BODY MASS INDEX: 27.56 KG/M2

## 2019-11-25 DIAGNOSIS — I10 HYPERTENSION, UNSPECIFIED TYPE: ICD-10-CM

## 2019-11-25 DIAGNOSIS — H54.8 LEGAL BLINDNESS: ICD-10-CM

## 2019-11-25 DIAGNOSIS — Z96.1 PSEUDOPHAKIA OF BOTH EYES: ICD-10-CM

## 2019-11-25 DIAGNOSIS — D86.83 IRIDOCYCLITIS DUE TO SARCOIDOSIS: ICD-10-CM

## 2019-11-25 DIAGNOSIS — E78.5 HYPERLIPIDEMIA, UNSPECIFIED HYPERLIPIDEMIA TYPE: Primary | ICD-10-CM

## 2019-11-25 DIAGNOSIS — H20.9 UVEITIC GLAUCOMA, BILATERAL, SEVERE STAGE: Primary | ICD-10-CM

## 2019-11-25 DIAGNOSIS — Z12.5 SCREENING PSA (PROSTATE SPECIFIC ANTIGEN): ICD-10-CM

## 2019-11-25 DIAGNOSIS — H18.422 BAND KERATOPATHY, LEFT: ICD-10-CM

## 2019-11-25 DIAGNOSIS — H40.043 STEROID RESPONDERS BORDERLINE GLAUCOMA, BILATERAL: ICD-10-CM

## 2019-11-25 DIAGNOSIS — H21.523 PAS (PERIPH ANTERIOR SYNECHIAE), BILATERAL: ICD-10-CM

## 2019-11-25 DIAGNOSIS — H26.491 POSTERIOR CAPSULAR OPACIFICATION VISUALLY SIGNIFICANT, RIGHT EYE: ICD-10-CM

## 2019-11-25 DIAGNOSIS — D86.0 SARCOIDOSIS OF LUNG: ICD-10-CM

## 2019-11-25 DIAGNOSIS — E55.9 VITAMIN D DEFICIENCY DISEASE: ICD-10-CM

## 2019-11-25 DIAGNOSIS — H40.43X3 UVEITIC GLAUCOMA, BILATERAL, SEVERE STAGE: Primary | ICD-10-CM

## 2019-11-25 DIAGNOSIS — H47.292 POSTINFLAMMATORY OPTIC ATROPHY, LEFT: ICD-10-CM

## 2019-11-25 PROCEDURE — 92012 PR EYE EXAM, EST PATIENT,INTERMED: ICD-10-PCS | Mod: S$GLB,,, | Performed by: OPHTHALMOLOGY

## 2019-11-25 PROCEDURE — 92133 HEIDELBERG RETINA TOMOGRAPHY (HRT) - OU - BOTH EYES: ICD-10-PCS | Mod: S$GLB,,, | Performed by: OPHTHALMOLOGY

## 2019-11-25 PROCEDURE — 90686 IIV4 VACC NO PRSV 0.5 ML IM: CPT | Mod: S$GLB,,, | Performed by: INTERNAL MEDICINE

## 2019-11-25 PROCEDURE — G0008 ADMIN INFLUENZA VIRUS VAC: HCPCS | Mod: S$GLB,,, | Performed by: INTERNAL MEDICINE

## 2019-11-25 PROCEDURE — 3075F SYST BP GE 130 - 139MM HG: CPT | Mod: CPTII,S$GLB,, | Performed by: INTERNAL MEDICINE

## 2019-11-25 PROCEDURE — 3008F BODY MASS INDEX DOCD: CPT | Mod: CPTII,S$GLB,, | Performed by: INTERNAL MEDICINE

## 2019-11-25 PROCEDURE — 99999 PR PBB SHADOW E&M-EST. PATIENT-LVL II: ICD-10-PCS | Mod: PBBFAC,,, | Performed by: OPHTHALMOLOGY

## 2019-11-25 PROCEDURE — 99499 UNLISTED E&M SERVICE: CPT | Mod: S$GLB,,, | Performed by: INTERNAL MEDICINE

## 2019-11-25 PROCEDURE — G0008 FLU VACCINE (QUAD) GREATER THAN OR EQUAL TO 3YO PRESERVATIVE FREE IM: ICD-10-PCS | Mod: S$GLB,,, | Performed by: INTERNAL MEDICINE

## 2019-11-25 PROCEDURE — 3079F PR MOST RECENT DIASTOLIC BLOOD PRESSURE 80-89 MM HG: ICD-10-PCS | Mod: CPTII,S$GLB,, | Performed by: INTERNAL MEDICINE

## 2019-11-25 PROCEDURE — 3008F PR BODY MASS INDEX (BMI) DOCUMENTED: ICD-10-PCS | Mod: CPTII,S$GLB,, | Performed by: INTERNAL MEDICINE

## 2019-11-25 PROCEDURE — 92012 INTRM OPH EXAM EST PATIENT: CPT | Mod: S$GLB,,, | Performed by: OPHTHALMOLOGY

## 2019-11-25 PROCEDURE — 99499 RISK ADDL DX/OHS AUDIT: ICD-10-PCS | Mod: S$GLB,,, | Performed by: INTERNAL MEDICINE

## 2019-11-25 PROCEDURE — 3079F DIAST BP 80-89 MM HG: CPT | Mod: CPTII,S$GLB,, | Performed by: INTERNAL MEDICINE

## 2019-11-25 PROCEDURE — 92133 CPTRZD OPH DX IMG PST SGM ON: CPT | Mod: S$GLB,,, | Performed by: OPHTHALMOLOGY

## 2019-11-25 PROCEDURE — 3075F PR MOST RECENT SYSTOLIC BLOOD PRESS GE 130-139MM HG: ICD-10-PCS | Mod: CPTII,S$GLB,, | Performed by: INTERNAL MEDICINE

## 2019-11-25 PROCEDURE — 99999 PR PBB SHADOW E&M-EST. PATIENT-LVL II: CPT | Mod: PBBFAC,,, | Performed by: OPHTHALMOLOGY

## 2019-11-25 PROCEDURE — 99999 PR PBB SHADOW E&M-EST. PATIENT-LVL IV: ICD-10-PCS | Mod: PBBFAC,,, | Performed by: INTERNAL MEDICINE

## 2019-11-25 PROCEDURE — 99214 PR OFFICE/OUTPT VISIT, EST, LEVL IV, 30-39 MIN: ICD-10-PCS | Mod: 25,S$GLB,, | Performed by: INTERNAL MEDICINE

## 2019-11-25 PROCEDURE — 90686 FLU VACCINE (QUAD) GREATER THAN OR EQUAL TO 3YO PRESERVATIVE FREE IM: ICD-10-PCS | Mod: S$GLB,,, | Performed by: INTERNAL MEDICINE

## 2019-11-25 PROCEDURE — 99214 OFFICE O/P EST MOD 30 MIN: CPT | Mod: 25,S$GLB,, | Performed by: INTERNAL MEDICINE

## 2019-11-25 PROCEDURE — 99999 PR PBB SHADOW E&M-EST. PATIENT-LVL IV: CPT | Mod: PBBFAC,,, | Performed by: INTERNAL MEDICINE

## 2019-11-25 RX ORDER — DORZOLAMIDE HCL 20 MG/ML
1 SOLUTION/ DROPS OPHTHALMIC 3 TIMES DAILY
Qty: 30 ML | Refills: 3 | Status: SHIPPED | OUTPATIENT
Start: 2019-11-25 | End: 2020-09-11 | Stop reason: SDUPTHER

## 2019-11-25 RX ORDER — LATANOPROST 50 UG/ML
1 SOLUTION/ DROPS OPHTHALMIC NIGHTLY
Qty: 3 BOTTLE | Refills: 3 | Status: SHIPPED | OUTPATIENT
Start: 2019-11-25 | End: 2020-09-11 | Stop reason: SDUPTHER

## 2019-11-25 RX ORDER — LATANOPROST 50 UG/ML
1 SOLUTION/ DROPS OPHTHALMIC NIGHTLY
Qty: 3 BOTTLE | Refills: 12 | Status: SHIPPED | OUTPATIENT
Start: 2019-11-25 | End: 2019-11-25 | Stop reason: SDUPTHER

## 2019-11-25 RX ORDER — BRIMONIDINE TARTRATE AND TIMOLOL MALEATE 2; 5 MG/ML; MG/ML
1 SOLUTION OPHTHALMIC 3 TIMES DAILY
Qty: 30 ML | Refills: 3 | Status: SHIPPED | OUTPATIENT
Start: 2019-11-25 | End: 2020-09-11 | Stop reason: SDUPTHER

## 2019-11-25 RX ORDER — BRIMONIDINE TARTRATE AND TIMOLOL MALEATE 2; 5 MG/ML; MG/ML
1 SOLUTION OPHTHALMIC 3 TIMES DAILY
Qty: 10 ML | Refills: 12 | Status: SHIPPED | OUTPATIENT
Start: 2019-11-25 | End: 2019-11-25 | Stop reason: SDUPTHER

## 2019-11-25 RX ORDER — ERGOCALCIFEROL 1.25 MG/1
CAPSULE ORAL
Qty: 12 CAPSULE | Refills: 8 | Status: SHIPPED | OUTPATIENT
Start: 2019-11-25 | End: 2020-12-16

## 2019-11-25 RX ORDER — AMLODIPINE BESYLATE 10 MG/1
10 TABLET ORAL DAILY
Qty: 90 TABLET | Refills: 3 | Status: SHIPPED | OUTPATIENT
Start: 2019-11-25 | End: 2020-12-22

## 2019-11-25 RX ORDER — DORZOLAMIDE HCL 20 MG/ML
1 SOLUTION/ DROPS OPHTHALMIC 3 TIMES DAILY
Qty: 30 ML | Refills: 3 | Status: SHIPPED | OUTPATIENT
Start: 2019-11-25 | End: 2019-11-25 | Stop reason: SDUPTHER

## 2019-11-25 RX ORDER — PRAVASTATIN SODIUM 40 MG/1
40 TABLET ORAL DAILY
Qty: 90 TABLET | Refills: 1 | Status: SHIPPED | OUTPATIENT
Start: 2019-11-25 | End: 2020-06-10

## 2019-11-25 NOTE — PROGRESS NOTES
"Mr. Lackey is a 59 -year-old gentleman coming   in today to follow up on his ongoing medical problems to include:       1. Hypertension. He is on Norvasc 10 mg a day. He has had hypertension   for several years. Blood pressure today is 132/80. no med change. He denies any   chest pain, short-windedness, palpitations, nausea or vomiting.        2. He has hyperlipidemia. He is on pravastatin 40 mg a day. In Feb his chol was 180, HDL 47,  (4/2018)            3. He has vitamin D deficiency with history of sarcoidosis. We put him on   vitamin D. He is on a 2 x monthly vitamin D. In Sept 2018  his Vit was 38-  He just started back his vit D in Feb- and he is taking one a week- but there is a question about this.        4. sarcoidosis. 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    He was started on MTX as steroid sparing agent and is tolerating it well.  Currently off 4 tabs MTX and folic acid 1 mg daily He denies any respiratory symptoms including wheezing or exercise intolerance. He went to  [facility] (5/2018) . I reviewedthose notes.   Other problems include sarcoid and glaucoma. He is seeing Ophthalmology .  for glaucoma and he is on multiple drops for this.             ROS : Gen - no fatigue or significant weight change  Eyes - no eye pain or visual changes  ENT - no hoarseness or sore throat  CV - No chest pain NO palpitations.   Pulm - no cough or wheezing-- otherwise as above.   GI - no N/V/D   no dysuria or incontinence  MS - no joint pain or muscle pain  Skin - no rash, or c/o of skin lesions  Neuro - no HA, dizziness--- memory is doing well.   Heme - no abnormal bleeding or bruising  Endo - no polydipsia, or temperature changes  Psych - no anxiety or depression         PHYSICAL EXAMINATION: /80 (BP Location: Left arm, Patient Position: Sitting, BP Method: Large (Manual))   Pulse (!) 59   Ht 5' 9" (1.753 m)   Wt 84.4 kg (186 lb 1.1 oz)   SpO2 96%   BMI 27.48 kg/m²        GENERAL: He is a " well-appearing 59-year-old gentleman in no acute   distress.  Poor vision- Legally blind  Ear examination was unremarkable.   Eyes- sclera is injected but normal for him- no ptosis.   NECK: Supple. He has no JVD. Thyroid is not enlarged. Chest- CTA bilaterally.   CARDIOVASCULAR: S1, S2. Regular rate and rhythm.   LUNGS: Clear bilaterally- no wheeze.   ABDOMEN: Soft, nontender, no hepatosplenomegaly, no guarding, rebound or   tenderness. Slight distended.   LOWER EXTREMITIES: No edema. He also complains of some tinnitus today. NO hair on legs.      ASSESSMENT:      1. Hypertension, -continue the  Amlodipine  10 mg a day   2. Hyperlipidemia Continue the pravastatin   3. Sarcoidosis, Reviewed notes from Rhe[facility],--He is set up to follow with [facility] and Optho, but has not seen Pulm since 2018- so will make referral   4. Vitamin D deficiency.- low   continue every 1 weeks.    5. Will check PSA

## 2019-11-25 NOTE — PROGRESS NOTES
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 (History)            Assessment /Plan     For exam results, see Encounter Report.    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 Uveitic glaucoma - Both Eyes OD>OS  2/2 sarcoidosis  Old pt of Dr. Wagner and Dr. Menard  First HVF 2012  First photos 2011     Family history   neg  Glaucoma meds   Off all drops and diamox 2013 to 2014  (pt lost insurance)(( had +++ progression off gtts)                              back on gtts combigan/azopt OU and latanoprost OS  H/O adverse rxn to glaucoma drops  None  LASERS   none  GLAUCOMA SURGERIES   Trab OD (3/20/ 2010 -Derian), Bleb Revision OD  3/21/2012 Loft// complex phaco/Baerveldt od 9/24/2014  OTHER EYE SURGERIES   PPV/Endo  "O5, CE OS (04)  CDR  0.95/0.5  Tbase  ?    Tmax  36/22 on meds      Ttarget   16/20       HVF  10-2 stim III OD (3/5/12)-sup arc,inf alt, tiny residual island OD; 24-2 OS w/ marked general depression             3/4/2013 ---HVF 10-2 stim III OD - SAD / Inf Alt los s            OD - 6 test 10-2 stim V - 2014 to 2019 - dense IAD / dense SAD - tiny central  island - stable             OS - 5 test 9360-1623  24-2 - stim V OS  // Inf Alt loss/SAD (3/2019 - 10-2 stim V done by mistake)     Gonio -  PAS OD - 360 // OS -  f    CCT  525/531  OCT  6 test 2011 to 2019   - dec. S/I/N od // Dec S/N/I   HRT  8 test 2012 to 2019  - MR -  Dec. S/T, bord I/N od // xx os /// CDR 9.78 od // xx os  Disc photos  2011, 2013, 2014, 2015, 2018   - OIS     Ttoday 16/12  ((down from 23/16 - now off steroid gtts od ))  Test done today -F/U yag cap od - monitoring for worsening / persistent iritis    2. Secondary iritis - Both Eyes      3. 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 Postinflammatory optic atrophy - Left Eye   -Sarcoid, + APD, uncertain etiology     5. Relative afferent pupillary defect OS     6. Steroid responders borderline glaucoma - Both Eyes -increased IOP w/ Pred Acetate. Does better w/ Vexol     7. Legal blindness - Both EyeS OD based on VF loss and OS based on VA.     8. Post-operative state - Both Eyes   -s/p bleb revision OD (3/21/12) had extensive subconj fibrosis, but eventually has done well.     9 Pseudophakic OD  S/P complex phaco/IOL / baerveldt od 9/24/2014   PC IOL os - done years ago - ? [facility] - ? IOL sewn in        Plan:  Uveitic glaucoma  Angle closure - PAS ou   Lost to F/U for 10 months from 9/2013 to 7/2014  - [facility] od went  from 20/40 to LP  Was already CF at 3 ft os- 2/2 ON pallor from sarcoid  IOP OD was 64 - off drops since 9/2013 - when re-presented     Pt is "legally blind" - he may want to apply for disability.    Pt previously had issues with insurance, but now has new insurance.  " Discussed with patient that the vision loss od is irreversible 2/2 glaucoma and the importance of compliance with gtt/meds.      Developed  anterior chamber fibrin OD  Post GDD  - it has has resolved - but prone to iritis with any intervention     Pt has seen the pulmonologist  Has seen an internist   Has  seen rheumatology     Was  on MTX for sarcoid and iritis - off - stopped on his own - seems to be doing ok (since 5/30/2018)  No increase in inflammationn od on 7/30/2018 // still ok off steroid gtts 11/12/2018    Cont combigan  bid ou - increase to tid   Cont PG's  latanoprost os q hs - for insurance reasons   - cont  dorzolamide OU -  Tid     Complains of blurry vision OS that is worsening --? Band keratopathy? Can refer for removal w/ cornea   - seen by cristine 5/2/2017 - not felt to be VS     CSM - glaucoma gtts   Stay off steroid gtts for now      yag cap od - 4/4/2019     Dry eyes   Gel gtts q hs os and ointment q hs os     F/U 3 months with  HVF 10-2 stim V od and try 24-2 stim V os // DFE // photos     Pt is getting training (first step) at the [facility] - says it is going well.- uses a mobility cane

## 2019-11-27 ENCOUNTER — TELEPHONE (OUTPATIENT)
Dept: INTERNAL MEDICINE | Facility: CLINIC | Age: 59
End: 2019-11-27

## 2019-11-27 DIAGNOSIS — R97.20 ELEVATED PSA: Primary | ICD-10-CM

## 2019-11-27 NOTE — TELEPHONE ENCOUNTER
Please call-- his PSA has gone up and he has family history of prostate cancer (brother)-- let's get him to see Urology for this.

## 2019-11-27 NOTE — TELEPHONE ENCOUNTER
Spoke with pt informed of test results, pt verbalized understanding and had no questions. Appt scheduled and reminder mailed.

## 2019-12-02 ENCOUNTER — TELEPHONE (OUTPATIENT)
Dept: RHEUMATOLOGY | Facility: CLINIC | Age: 59
End: 2019-12-02

## 2019-12-02 NOTE — TELEPHONE ENCOUNTER
Spoke with patent care giver, he is a patient of Dr Lyons but he was put ion Dr Montez schedule for today. He will need to be reschedule

## 2019-12-09 ENCOUNTER — OFFICE VISIT (OUTPATIENT)
Dept: UROLOGY | Facility: CLINIC | Age: 59
End: 2019-12-09
Payer: MEDICARE

## 2019-12-09 VITALS
DIASTOLIC BLOOD PRESSURE: 87 MMHG | HEART RATE: 57 BPM | SYSTOLIC BLOOD PRESSURE: 159 MMHG | WEIGHT: 191 LBS | BODY MASS INDEX: 28.29 KG/M2 | HEIGHT: 69 IN

## 2019-12-09 DIAGNOSIS — R97.20 ELEVATED PSA: Primary | ICD-10-CM

## 2019-12-09 PROCEDURE — 3008F PR BODY MASS INDEX (BMI) DOCUMENTED: ICD-10-PCS | Mod: CPTII,S$GLB,, | Performed by: UROLOGY

## 2019-12-09 PROCEDURE — 3079F PR MOST RECENT DIASTOLIC BLOOD PRESSURE 80-89 MM HG: ICD-10-PCS | Mod: CPTII,S$GLB,, | Performed by: UROLOGY

## 2019-12-09 PROCEDURE — 3008F BODY MASS INDEX DOCD: CPT | Mod: CPTII,S$GLB,, | Performed by: UROLOGY

## 2019-12-09 PROCEDURE — 3077F PR MOST RECENT SYSTOLIC BLOOD PRESSURE >= 140 MM HG: ICD-10-PCS | Mod: CPTII,S$GLB,, | Performed by: UROLOGY

## 2019-12-09 PROCEDURE — 99204 PR OFFICE/OUTPT VISIT, NEW, LEVL IV, 45-59 MIN: ICD-10-PCS | Mod: S$GLB,,, | Performed by: UROLOGY

## 2019-12-09 PROCEDURE — 3079F DIAST BP 80-89 MM HG: CPT | Mod: CPTII,S$GLB,, | Performed by: UROLOGY

## 2019-12-09 PROCEDURE — 99999 PR PBB SHADOW E&M-EST. PATIENT-LVL III: ICD-10-PCS | Mod: PBBFAC,,, | Performed by: UROLOGY

## 2019-12-09 PROCEDURE — 99999 PR PBB SHADOW E&M-EST. PATIENT-LVL III: CPT | Mod: PBBFAC,,, | Performed by: UROLOGY

## 2019-12-09 PROCEDURE — 3077F SYST BP >= 140 MM HG: CPT | Mod: CPTII,S$GLB,, | Performed by: UROLOGY

## 2019-12-09 PROCEDURE — 99204 OFFICE O/P NEW MOD 45 MIN: CPT | Mod: S$GLB,,, | Performed by: UROLOGY

## 2019-12-09 RX ORDER — CIPROFLOXACIN 500 MG/1
TABLET ORAL
Qty: 4 TABLET | Refills: 0 | Status: SHIPPED | OUTPATIENT
Start: 2019-12-09 | End: 2020-02-17

## 2019-12-09 NOTE — PROGRESS NOTES
Subjective:      Patient ID: Jaguar Lackey is a 59 y.o. male.    Chief Complaint: Other (elevated psa)    HPI  Patient is a 59 y.o. male who is new to our clinic and referred by their PCP, Dr. Desir for evaluation of an elevated psa.   He has some intermittency, urgency, weak stream, occasional straining, and nocturia 1 time at night.  No bone pain or unexpected weight loss. No fh of prostate cancer.       IPSS Questionnaire (AUA-7):  Over the past month    1)  How often have you had a sensation of not emptying your bladder completely after you finish urinating?  0 - Not at all   2)  How often have you had to urinate again less than two hours after you finished urinating? 0 - Not at all   3)  How often have you found you stopped and started again several times when you urinated?  2 - Less than half the time   4) How difficult have you found it to postpone urination?  2 - Less than half the time   5) How often have you had a weak urinary stream?  2 - Less than half the time   6) How often have you had to push or strain to begin urination?  2 - Less than half the time   7) How many times did you most typically get up to urinate from the time you went to bed until the time you got up in the morning?  1 - 1 time   Total score:  0-7 mildly symptomatic    8-19 moderately symptomatic    20-35 severely symptomatic         Lab Results   Component Value Date    PSA 5.8 (H) 11/25/2019    PSA 3.6 09/10/2018    PSA 2.4 02/16/2016    PSA 3.1 02/16/2015    PSA 2.18 07/12/2013    PSA 3.07 10/29/2012    PSA 2.1 04/08/2011    PSA 2.0 08/16/2010    PSA 2.3 01/04/2010    PSA 1.4 12/26/2008         Review of Systems   All other systems reviewed and negative except pertinent positives noted in HPI.    Objective:      Physical Exam   Nursing note and vitals reviewed.  Constitutional: He is oriented to person, place, and time. Vital signs are normal. He appears well-developed and well-nourished. He is cooperative.   HENT:   Head:  Normocephalic.   Neck: No tracheal deviation present.   Cardiovascular: Normal rate and intact distal pulses.    Pulmonary/Chest: Effort normal. No accessory muscle usage. No tachypnea. No respiratory distress.   Abdominal: Soft. Normal appearance. He exhibits no distension, no fluid wave, no ascites and no mass. There is no tenderness. There is no rebound and no CVA tenderness. No hernia. Hernia confirmed negative in the right inguinal area and confirmed negative in the left inguinal area.   Liver/spleen non-palpable.   Genitourinary: Prostate normal, testes normal and penis normal. Rectal exam shows no external hemorrhoid, no mass, no tenderness and anal tone normal. Prostate is not tender. Right testis shows no mass and no tenderness. Right testis is descended. Left testis shows no mass and no tenderness. Left testis is descended. Circumcised.   Genitourinary Comments: Scrotum showed no rashes or lesions.  Anus/perineum without lesion.  Epididymis showed no masses or tenderness. No meatal stenosis, meatus in normal location. No penile discharge/plaques. Prostate smooth, no nodules, 30 grams.  Seminal vesicles non-palpable.  Normal sphincter tone.        Musculoskeletal: Normal range of motion.   Lymphadenopathy: No inguinal adenopathy noted on the right or left side.        Right: No inguinal adenopathy present.        Left: No inguinal adenopathy present.   Neurological: He is alert and oriented to person, place, and time. He has normal strength.   Skin: No bruising and no rash noted.     Psychiatric: He has a normal mood and affect. His speech is normal and behavior is normal. Thought content normal.       Assessment:       1. Elevated PSA        Plan:     1. Elevated PSA        No orders of the defined types were placed in this encounter.      1. Elevated PSA:   - The patient was counseled on the implications of an elevated PSA. It was explained in detail that this is a screening test and does not indicate any  known presence of prostate cancer but that he is at increased risk given he has an elevated PSA. All his questions were answered by me fully.     -The patient will be scheduled for a prostate biopsy.  The risks and benefits of the procedure were discussed with the patient in detail.  The risks include but are not limited to bleeding, infection, pain, erectile dysfunction, urinary retention, and the need for further procedures.  The patient was told to stop all blood thinners at least one week prior to the procedure.  The patient will do a fleets enema the AM of the biopsy and begin a course of ciprofloxacin the evening prior to the procedure and complete as prescribed.

## 2019-12-09 NOTE — LETTER
December 9, 2019      Carloz Desir Jr., MD  [address]  [facility] [address]           [facility]  [address]  [facility] [address]  Phone: [phone]          Patient: Jaguar Lackey   MR Number: [id number]   YOB: 1960   Date of Visit: 12/9/2019       Dear Dr. Carloz Desir Jr.:    Thank you for referring Jaguar Lackey to me for evaluation. Attached you will find relevant portions of my assessment and plan of care.    If you have questions, please do not hesitate to call me. I look forward to following Jaguar Lackey along with you.    Sincerely,    Félix Acevedo MD    Enclosure  CC:  No Recipients    If you would like to receive this communication electronically, please contact [email or web address] or [phone] to request more information on [employer] access.    For providers and/or their staff who would like to refer a patient to [facility], please contact us through our one-stop-shop provider referral line, [facility], at [phone].    If you feel you have received this communication in error or would no longer like to receive these types of communications, please e-mail [email or web address]

## 2019-12-26 ENCOUNTER — HOSPITAL ENCOUNTER (OUTPATIENT)
Dept: PULMONOLOGY | Facility: CLINIC | Age: 59
Discharge: HOME | End: 2019-12-26
Payer: MEDICARE

## 2019-12-26 ENCOUNTER — OFFICE VISIT (OUTPATIENT)
Dept: PULMONOLOGY | Facility: CLINIC | Age: 59
End: 2019-12-26
Payer: MEDICARE

## 2019-12-26 VITALS
HEART RATE: 62 BPM | WEIGHT: 187.63 LBS | BODY MASS INDEX: 27.79 KG/M2 | SYSTOLIC BLOOD PRESSURE: 110 MMHG | OXYGEN SATURATION: 99 % | DIASTOLIC BLOOD PRESSURE: 70 MMHG | HEIGHT: 69 IN

## 2019-12-26 DIAGNOSIS — D86.0 SARCOIDOSIS OF LUNG: ICD-10-CM

## 2019-12-26 DIAGNOSIS — R06.00 DYSPNEA, UNSPECIFIED TYPE: Primary | ICD-10-CM

## 2019-12-26 DIAGNOSIS — R06.00 DYSPNEA, UNSPECIFIED TYPE: ICD-10-CM

## 2019-12-26 PROCEDURE — 94060 PR EVAL OF BRONCHOSPASM: ICD-10-PCS | Mod: S$GLB,,, | Performed by: INTERNAL MEDICINE

## 2019-12-26 PROCEDURE — 3074F SYST BP LT 130 MM HG: CPT | Mod: CPTII,S$GLB,, | Performed by: NURSE PRACTITIONER

## 2019-12-26 PROCEDURE — 99214 OFFICE O/P EST MOD 30 MIN: CPT | Mod: S$GLB,,, | Performed by: NURSE PRACTITIONER

## 2019-12-26 PROCEDURE — 3074F PR MOST RECENT SYSTOLIC BLOOD PRESSURE < 130 MM HG: ICD-10-PCS | Mod: CPTII,S$GLB,, | Performed by: NURSE PRACTITIONER

## 2019-12-26 PROCEDURE — 3078F PR MOST RECENT DIASTOLIC BLOOD PRESSURE < 80 MM HG: ICD-10-PCS | Mod: CPTII,S$GLB,, | Performed by: NURSE PRACTITIONER

## 2019-12-26 PROCEDURE — 94729 DIFFUSING CAPACITY: CPT | Mod: S$GLB,,, | Performed by: INTERNAL MEDICINE

## 2019-12-26 PROCEDURE — 99999 PR PBB SHADOW E&M-EST. PATIENT-LVL III: CPT | Mod: PBBFAC,,, | Performed by: NURSE PRACTITIONER

## 2019-12-26 PROCEDURE — 99214 PR OFFICE/OUTPT VISIT, EST, LEVL IV, 30-39 MIN: ICD-10-PCS | Mod: S$GLB,,, | Performed by: NURSE PRACTITIONER

## 2019-12-26 PROCEDURE — 94060 EVALUATION OF WHEEZING: CPT | Mod: S$GLB,,, | Performed by: INTERNAL MEDICINE

## 2019-12-26 PROCEDURE — 3078F DIAST BP <80 MM HG: CPT | Mod: CPTII,S$GLB,, | Performed by: NURSE PRACTITIONER

## 2019-12-26 PROCEDURE — 99999 PR PBB SHADOW E&M-EST. PATIENT-LVL III: ICD-10-PCS | Mod: PBBFAC,,, | Performed by: NURSE PRACTITIONER

## 2019-12-26 PROCEDURE — 94729 PR C02/MEMBANE DIFFUSE CAPACITY: ICD-10-PCS | Mod: S$GLB,,, | Performed by: INTERNAL MEDICINE

## 2019-12-26 PROCEDURE — 3008F PR BODY MASS INDEX (BMI) DOCUMENTED: ICD-10-PCS | Mod: CPTII,S$GLB,, | Performed by: NURSE PRACTITIONER

## 2019-12-26 PROCEDURE — 3008F BODY MASS INDEX DOCD: CPT | Mod: CPTII,S$GLB,, | Performed by: NURSE PRACTITIONER

## 2019-12-26 NOTE — PROGRESS NOTES
Subjective:       Patient ID: Jaguar Lackey is a 59 y.o. male.    Chief Complaint: Sarcoidosis of lung    HPI:   Jaguar Lackey is a 59 y.o. male who presents with follow up for sarcoidosis. He has been seen by Monty Crowder and Rocky in the past but is new to me.   Used albuterol in the past but hasn't had one in many years.  No wheezing in last 18 months. Follows with Dr. Lyons for Rheumatology.   Having shortness of breath with cardio.  Does have to stop exercising when he is doing cardiovascular exercise, does squates and running inplace.  No steroids, no hospitalizations, not on methotrexate.  He isn't exercising as much as previously, due to work he has issues finding time to exercise as much.  He finds that his dyspnea is about the same as when we last saw him.    He works as an advocate at the [employer]    Review of Systems   Constitutional: Negative for fever, activity change, fatigue and night sweats.   HENT: Positive for postnasal drip (when using heat at home). Negative for rhinorrhea, trouble swallowing and congestion.    Eyes: Negative for itching.   Respiratory: Positive for cough, chest tightness (when rushing or hurried) and dyspnea on extertion. Negative for hemoptysis, sputum production, choking, shortness of breath, wheezing and use of rescue inhaler.    Cardiovascular: Negative for chest pain and palpitations.   Genitourinary: Negative for difficulty urinating.   Endocrine: Negative for cold intolerance and heat intolerance.    Musculoskeletal: Negative for arthralgias.   Skin: Negative for rash.   Gastrointestinal: Negative for nausea, vomiting and acid reflux.   Neurological: Negative for dizziness and light-headedness.   Hematological: Negative for adenopathy.   Psychiatric/Behavioral: Negative for sleep disturbance.         Social History     Tobacco Use    Smoking status: Never Smoker    Smokeless tobacco: Never Used    Tobacco comment: disability since 2013;     Substance Use Topics    Alcohol use: No       Review of patient's allergies indicates:  No Known Allergies  Past Medical History:   Diagnosis Date    Dry eyes     Glaucoma     Hyperlipidemia     Hypertension     Myalgia and myositis 2/16/2016    Sarcoidosis of lung     Uveitis      Past Surgical History:   Procedure Laterality Date    BAERVELDT GLAUCOMA IMPLANT AND CATARACT REMOVAL Right 9/24/14    OD ()    Bleb Revision Right 3/12    OD with Dr. Pratt    CATARACT EXTRACTION W/  INTRAOCULAR LENS IMPLANT Left n/a    OS (Dr. Menard)    CATARACT EXTRACTION W/  INTRAOCULAR LENS IMPLANT Right 9/24/14    OD with glaucoma sx ()    FRACTURE SURGERY      right foot    Needling Bleb Revision   3/12    OD Dr. Pratt    PARS PLANA VITRECTOMY W/ ENDOPHOTOCOAGULATION Left 2005    TRABECULECTOMY Right 2010    OD Dr. Menard     Current Outpatient Medications on File Prior to Visit   Medication Sig    amLODIPine (NORVASC) 10 MG tablet Take 1 tablet (10 mg total) by mouth once daily.    brimonidine-timolol (COMBIGAN) 0.2-0.5 % Drop Place 1 drop into both eyes 3 (three) times daily.    ciprofloxacin HCl (CIPRO) 500 MG tablet 1 pill pm before procedure ,1 pill am of procedure, 1 pill pm after procedure and 1 pill am after procedure.    dorzolamide (TRUSOPT) 2 % ophthalmic solution Place 1 drop into both eyes 3 (three) times daily.    ergocalciferol (ERGOCALCIFEROL) 50,000 unit Cap TAKE ONE CAPSULE BY MOUTH ONE TIME PER WEEK    latanoprost 0.005 % ophthalmic solution Place 1 drop into the left eye every evening.    pravastatin (PRAVACHOL) 40 MG tablet Take 1 tablet (40 mg total) by mouth once daily.     No current facility-administered medications on file prior to visit.        Objective:      Vitals:    12/26/19 1433   BP: 110/70   Pulse: 62     Physical Exam   Constitutional: He is oriented to person, place, and time. He appears well-developed and well-nourished. No distress.    HENT:   Head: Normocephalic.   Neck: Normal range of motion. Neck supple.   Cardiovascular: Normal rate and regular rhythm.   No murmur heard.  Pulmonary/Chest: Normal expansion, symmetric chest wall expansion, effort normal and breath sounds normal. No respiratory distress. He has no decreased breath sounds. He has no wheezes. He has no rhonchi. He has no rales.   Abdominal: Soft. He exhibits no distension. There is no hepatosplenomegaly. There is no tenderness.   Musculoskeletal: Normal range of motion. He exhibits no edema.   Lymphadenopathy:     He has no cervical adenopathy.   Neurological: He is alert and oriented to person, place, and time. Gait normal.   Skin: Skin is warm and dry. No cyanosis. Nails show no clubbing.   Psychiatric: He has a normal mood and affect.   Vitals reviewed.    Personal Diagnostic Review    PFTs personally reviewed and discussed with patient        Assessment:        Problem List Items Addressed This Visit        Immunology/Multi System    Sarcoidosis of lung    Overview     Off of steroids and MTX for some time.  Has been stable for many years.         Current Assessment & Plan     Continue to follow annually.  PFTs show no obstruction or restriction.  DLCO is decreased, has been on previous PFTs.             Other    Dyspnea - Primary    Relevant Orders    Spirometry with/without bronchodilator (Completed)    DLCO-Carbon Monoxide Diffusing Capacity

## 2019-12-26 NOTE — PATIENT INSTRUCTIONS
Keep exercising!    Get your breathing tests when you can, I will contact you with the results  Let us know if we can do anything for you

## 2019-12-26 NOTE — LETTER
December 27, 2019      Carloz Desir Jr., MD  [address]  [facility] [address]           [facility]  [address]  [facility] [address]  Phone: [phone]          Patient: Jaguar Lackey   MR Number: [id number]   YOB: 1960   Date of Visit: 12/26/2019       Dear Dr. Carloz Desir Jr.:    Thank you for referring Jaguar Lackey to me for evaluation. Attached you will find relevant portions of my assessment and plan of care.    If you have questions, please do not hesitate to call me. I look forward to following Jaguar Lackey along with you.    Sincerely,    Lissette Bernstein, DNP    Enclosure  CC:  No Recipients    If you would like to receive this communication electronically, please contact [email or web address] or [phone] to request more information on [employer] access.    For providers and/or their staff who would like to refer a patient to [facility], please contact us through our one-stop-shop provider referral line, [facility], at [phone].    If you feel you have received this communication in error or would no longer like to receive these types of communications, please e-mail [email or web address]

## 2019-12-27 LAB
DLCO ADJ PRE: 17.28 ML/(MIN*MMHG) (ref 21.49–35.35)
DLCO SINGLE BREATH LLN: 21.49
DLCO SINGLE BREATH PRE REF: 60.8 %
DLCO SINGLE BREATH REF: 28.42
DLCOC SBVA LLN: 2.9
DLCOC SBVA PRE REF: 97.5 %
DLCOC SBVA REF: 4.12
DLCOC SINGLE BREATH LLN: 21.49
DLCOC SINGLE BREATH PRE REF: 60.8 %
DLCOC SINGLE BREATH REF: 28.42
DLCOCSBVAULN: 5.34
DLCOCSINGLEBREATHULN: 35.35
DLCOSINGLEBREATHULN: 35.35
DLCOVA LLN: 2.9
DLCOVA PRE REF: 97.5 %
DLCOVA PRE: 4.01 ML/(MIN*MMHG*L) (ref 2.9–5.34)
DLCOVA REF: 4.12
DLCOVAULN: 5.34
DLVAADJ PRE: 4.01 ML/(MIN*MMHG*L) (ref 2.9–5.34)
FEF 25 75 LLN: 1.07
FEF 25 75 PRE REF: 64.5 %
FEF 25 75 REF: 2.54
FET100 CHG: 2.9 %
FEV05 LLN: 1.59
FEV05 REF: 2.73
FEV1 CHG: 7.4 %
FEV1 FVC LLN: 67
FEV1 FVC PRE REF: 93.6 %
FEV1 FVC REF: 78
FEV1 LLN: 2.15
FEV1 PRE REF: 83.8 %
FEV1 REF: 2.96
FEV1 VOL CHG: 0.18
FVC CHG: 3.8 %
FVC LLN: 2.81
FVC PRE REF: 89.5 %
FVC REF: 3.78
FVC VOL CHG: 0.13
IVC PRE: 3.06 L (ref 2.81–4.75)
IVC SINGLE BREATH LLN: 2.81
IVC SINGLE BREATH PRE REF: 80.9 %
IVC SINGLE BREATH REF: 3.78
IVCSINGLEBREATHULN: 4.75
PEF LLN: 5.62
PEF PRE REF: 83.4 %
PEF REF: 8.19
PHYSICIAN COMMENT: ABNORMAL
POST FEF 25 75: 1.97 L/S (ref 1.07–4.01)
POST FET 100: 6.34 SEC
POST FEV1 FVC: 75.92 % (ref 66.71–89.94)
POST FEV1: 2.66 L (ref 2.15–3.77)
POST FEV5: 2.09 L (ref 1.59–3.86)
POST FVC: 3.51 L (ref 2.81–4.75)
POST PEF: 6.54 L/S (ref 5.62–10.76)
PRE DLCO: 17.28 ML/(MIN*MMHG) (ref 21.49–35.35)
PRE FEF 25 75: 1.64 L/S (ref 1.07–4.01)
PRE FET 100: 6.17 SEC
PRE FEV05 REF: 71.5 %
PRE FEV1 FVC: 73.33 % (ref 66.71–89.94)
PRE FEV1: 2.48 L (ref 2.15–3.77)
PRE FEV5: 1.95 L (ref 1.59–3.86)
PRE FVC: 3.38 L (ref 2.81–4.75)
PRE PEF: 6.83 L/S (ref 5.62–10.76)
VA PRE: 4.31 L (ref 6.75–6.75)
VA SINGLE BREATH LLN: 6.75
VA SINGLE BREATH PRE REF: 63.8 %
VA SINGLE BREATH REF: 6.75
VASINGLEBREATHULN: 6.75

## 2019-12-27 NOTE — ASSESSMENT & PLAN NOTE
Continue to follow annually.  PFTs show no obstruction or restriction.  DLCO is decreased, has been on previous PFTs.

## 2019-12-30 ENCOUNTER — TELEPHONE (OUTPATIENT)
Dept: PULMONOLOGY | Facility: CLINIC | Age: 59
End: 2019-12-30

## 2020-01-09 ENCOUNTER — TELEPHONE (OUTPATIENT)
Dept: UROLOGY | Facility: CLINIC | Age: 60
End: 2020-01-09

## 2020-01-09 NOTE — TELEPHONE ENCOUNTER
Left message at both home and mobile numbers to return call to reschedule biopsy appointments.  zay bautista lpn

## 2020-01-29 ENCOUNTER — HOSPITAL ENCOUNTER (OUTPATIENT)
Dept: UROLOGY | Facility: HOSPITAL | Age: 60
Discharge: HOME | End: 2020-01-29
Attending: UROLOGY
Payer: MEDICARE

## 2020-01-29 VITALS
BODY MASS INDEX: 27.79 KG/M2 | DIASTOLIC BLOOD PRESSURE: 82 MMHG | RESPIRATION RATE: 18 BRPM | WEIGHT: 187.63 LBS | HEART RATE: 62 BPM | TEMPERATURE: 99 F | SYSTOLIC BLOOD PRESSURE: 155 MMHG | HEIGHT: 69 IN

## 2020-01-29 DIAGNOSIS — R97.20 ELEVATED PSA: Primary | ICD-10-CM

## 2020-01-29 PROCEDURE — 55700 PR BIOPSY OF PROSTATE,NEEDLE/PUNCH: CPT | Mod: ,,, | Performed by: UROLOGY

## 2020-01-29 PROCEDURE — 76872 US TRANSRECTAL: CPT

## 2020-01-29 PROCEDURE — 88305 TISSUE EXAM BY PATHOLOGIST: CPT | Mod: 59 | Performed by: PATHOLOGY

## 2020-01-29 PROCEDURE — 88342 IMHCHEM/IMCYTCHM 1ST ANTB: CPT | Mod: 26,,, | Performed by: PATHOLOGY

## 2020-01-29 PROCEDURE — 88342 CHG IMMUNOCYTOCHEMISTRY: ICD-10-PCS | Mod: 26,,, | Performed by: PATHOLOGY

## 2020-01-29 PROCEDURE — 55700 HC BIOPSY OF PROSTATE - NEEDLE OR PUNCH: CPT

## 2020-01-29 PROCEDURE — 88305 TISSUE EXAM BY PATHOLOGIST: CPT | Mod: 26,,, | Performed by: PATHOLOGY

## 2020-01-29 PROCEDURE — 88305 TISSUE EXAM BY PATHOLOGIST: ICD-10-PCS | Mod: 26,,, | Performed by: PATHOLOGY

## 2020-01-29 PROCEDURE — 55700 PR BIOPSY OF PROSTATE,NEEDLE/PUNCH: ICD-10-PCS | Mod: ,,, | Performed by: UROLOGY

## 2020-01-29 PROCEDURE — 76942 ECHO GUIDE FOR BIOPSY: CPT | Mod: 59

## 2020-01-29 PROCEDURE — 88341 IMHCHEM/IMCYTCHM EA ADD ANTB: CPT | Performed by: PATHOLOGY

## 2020-01-29 PROCEDURE — 88341 IMHCHEM/IMCYTCHM EA ADD ANTB: CPT | Mod: 26,,, | Performed by: PATHOLOGY

## 2020-01-29 PROCEDURE — 88342 IMHCHEM/IMCYTCHM 1ST ANTB: CPT | Performed by: PATHOLOGY

## 2020-01-29 PROCEDURE — 76872 PR US TRANSRECTAL: ICD-10-PCS | Mod: 26,,, | Performed by: UROLOGY

## 2020-01-29 PROCEDURE — 76872 US TRANSRECTAL: CPT | Mod: 26,,, | Performed by: UROLOGY

## 2020-01-29 PROCEDURE — 88341 PR IHC OR ICC EACH ADD'L SINGLE ANTIBODY  STAINPR: ICD-10-PCS | Mod: 26,,, | Performed by: PATHOLOGY

## 2020-01-29 RX ORDER — LIDOCAINE HYDROCHLORIDE 20 MG/ML
JELLY TOPICAL
Status: COMPLETED | OUTPATIENT
Start: 2020-01-29 | End: 2020-01-29

## 2020-01-29 RX ORDER — LIDOCAINE HYDROCHLORIDE 10 MG/ML
20 INJECTION INFILTRATION; PERINEURAL
Status: COMPLETED | OUTPATIENT
Start: 2020-01-29 | End: 2020-01-29

## 2020-01-29 RX ADMIN — LIDOCAINE HYDROCHLORIDE: 20 JELLY TOPICAL at 02:01

## 2020-01-29 RX ADMIN — LIDOCAINE HYDROCHLORIDE 20 ML: 10 INJECTION INFILTRATION; PERINEURAL at 01:01

## 2020-01-29 NOTE — PROCEDURES
Pre-procedure diagnosis: elevated psa  Lab Results   Component Value Date    PSA 5.8 (H) 11/25/2019    PSA 3.6 09/10/2018    PSA 2.4 02/16/2016    PSA 3.1 02/16/2015    PSA 2.18 07/12/2013    PSA 3.07 10/29/2012    PSA 2.1 04/08/2011    PSA 2.0 08/16/2010    PSA 2.3 01/04/2010    PSA 1.4 12/26/2008       Post-procedure diagnosis: same    Procedure: Transrectal ultrasound guided prostate biopsy    Complications: none    Blood loss: minimal    Surgeon: Félix Acevedo MD    Anesthesia: 10cc lidocaine jelly, 20cc 1% lidocaine for prostatic block    TRUS size: 52cc    Procedure: The risks and benefits of the procedure were explained to the patient and consent was obtained.  The patient laid in the lateral decubitus position.  10cc of lidocaine jelly was used for local analgesia in the rectum.  The ultrasound probe was advanced into the rectum. The prostate was visualized.  There was a median lobe.  20cc of 1% lidocaine without epi was used for a prostatic nerve block.  12 biopsies were then takes, 2 from the apex, mid and base from the right and left side.    The patient tolerated the procedure well and was discharged home.  We will call him in 2 weeks when the results are available for review.  He will finish the course of antibiotics.

## 2020-01-29 NOTE — PATIENT INSTRUCTIONS

## 2020-01-29 NOTE — H&P
Subjective:      Patient ID: Jaguar Lackey is a 59 y.o. male.    Chief Complaint:  Prostate biopsy    HPI  Patient is a 59 y.o. male who is new to our clinic and referred by their PCP, Dr. Desir for evaluation of an elevated psa.   He has some intermittency, urgency, weak stream, occasional straining, and nocturia 1 time at night.  No bone pain or unexpected weight loss. No fh of prostate cancer.       IPSS Questionnaire (AUA-7):  Over the past month    1)  How often have you had a sensation of not emptying your bladder completely after you finish urinating?  0 - Not at all   2)  How often have you had to urinate again less than two hours after you finished urinating? 0 - Not at all   3)  How often have you found you stopped and started again several times when you urinated?  2 - Less than half the time   4) How difficult have you found it to postpone urination?  2 - Less than half the time   5) How often have you had a weak urinary stream?  2 - Less than half the time   6) How often have you had to push or strain to begin urination?  2 - Less than half the time   7) How many times did you most typically get up to urinate from the time you went to bed until the time you got up in the morning?  1 - 1 time   Total score:  0-7 mildly symptomatic    8-19 moderately symptomatic    20-35 severely symptomatic         Lab Results   Component Value Date    PSA 5.8 (H) 11/25/2019    PSA 3.6 09/10/2018    PSA 2.4 02/16/2016    PSA 3.1 02/16/2015    PSA 2.18 07/12/2013    PSA 3.07 10/29/2012    PSA 2.1 04/08/2011    PSA 2.0 08/16/2010    PSA 2.3 01/04/2010    PSA 1.4 12/26/2008         Review of Systems   All other systems reviewed and negative except pertinent positives noted in HPI.    Objective:      Physical Exam   Nursing note and vitals reviewed.  Constitutional: He is oriented to person, place, and time. Vital signs are normal. He appears well-developed and well-nourished. He is cooperative.   HENT:   Head:  Normocephalic.   Neck: No tracheal deviation present.   Cardiovascular: Normal rate and intact distal pulses.    Pulmonary/Chest: Effort normal. No accessory muscle usage. No tachypnea. No respiratory distress.   Abdominal: Soft. Normal appearance. He exhibits no distension, no fluid wave, no ascites and no mass. There is no tenderness. There is no rebound and no CVA tenderness. No hernia. Hernia confirmed negative in the right inguinal area and confirmed negative in the left inguinal area.   Liver/spleen non-palpable.   Genitourinary: Prostate normal, testes normal and penis normal. Rectal exam shows no external hemorrhoid, no mass, no tenderness and anal tone normal. Prostate is not tender. Right testis shows no mass and no tenderness. Right testis is descended. Left testis shows no mass and no tenderness. Left testis is descended. Circumcised.   Genitourinary Comments: Scrotum showed no rashes or lesions.  Anus/perineum without lesion.  Epididymis showed no masses or tenderness. No meatal stenosis, meatus in normal location. No penile discharge/plaques. Prostate smooth, no nodules, 30 grams.  Seminal vesicles non-palpable.  Normal sphincter tone.        Musculoskeletal: Normal range of motion.   Lymphadenopathy: No inguinal adenopathy noted on the right or left side.        Right: No inguinal adenopathy present.        Left: No inguinal adenopathy present.   Neurological: He is alert and oriented to person, place, and time. He has normal strength.   Skin: No bruising and no rash noted.     Psychiatric: He has a normal mood and affect. His speech is normal and behavior is normal. Thought content normal.       Assessment:       1. Elevated PSA        Plan:     1. Elevated PSA        Orders Placed This Encounter   Procedures    Transrectal Ultrasound w/ Biopsy     Standing Status:   Standing     Number of Occurrences:   1       1. Elevated PSA:   - I have explained the indication, risks, benefits, and alternatives  of the procedure in detail.  The patient voices understanding and all questions have been answered.  The patient agrees to proceed as planned with TRUS biopsy.

## 2020-02-11 LAB
COMMENT: NORMAL
FINAL PATHOLOGIC DIAGNOSIS: NORMAL
GROSS: NORMAL
Lab: NORMAL
MICROSCOPIC EXAM: NORMAL

## 2020-02-12 ENCOUNTER — TELEPHONE (OUTPATIENT)
Dept: UROLOGY | Facility: HOSPITAL | Age: 60
End: 2020-02-12

## 2020-02-12 NOTE — TELEPHONE ENCOUNTER
Attempted to call patient---no answer and mailbox is full.  Needs an appt to discuss the results.  30 minute appointment.

## 2020-02-12 NOTE — TELEPHONE ENCOUNTER
----- Message from Aleksandar Villalpando LPN sent at 2/12/2020  8:58 AM CST -----  Contact: Patient      ----- Message -----  From: Mari Hoguh  Sent: 2/12/2020   8:15 AM CST  To: Curtis Heard Staff    Need Advice:      Reason: Patient would like to get his results       Communication Preference: [phone]

## 2020-02-17 ENCOUNTER — OFFICE VISIT (OUTPATIENT)
Dept: OPHTHALMOLOGY | Facility: CLINIC | Age: 60
End: 2020-02-17
Payer: COMMERCIAL

## 2020-02-17 ENCOUNTER — OFFICE VISIT (OUTPATIENT)
Dept: RHEUMATOLOGY | Facility: CLINIC | Age: 60
End: 2020-02-17
Payer: MEDICARE

## 2020-02-17 ENCOUNTER — CLINICAL SUPPORT (OUTPATIENT)
Dept: OPHTHALMOLOGY | Facility: CLINIC | Age: 60
End: 2020-02-17
Payer: COMMERCIAL

## 2020-02-17 VITALS
DIASTOLIC BLOOD PRESSURE: 78 MMHG | HEART RATE: 59 BPM | WEIGHT: 190.06 LBS | SYSTOLIC BLOOD PRESSURE: 126 MMHG | HEIGHT: 69 IN | BODY MASS INDEX: 28.15 KG/M2

## 2020-02-17 DIAGNOSIS — H54.8 LEGAL BLINDNESS: ICD-10-CM

## 2020-02-17 DIAGNOSIS — H40.043 STEROID RESPONDERS BORDERLINE GLAUCOMA, BILATERAL: ICD-10-CM

## 2020-02-17 DIAGNOSIS — H18.422 BAND KERATOPATHY, LEFT: Primary | ICD-10-CM

## 2020-02-17 DIAGNOSIS — D86.83 IRIDOCYCLITIS DUE TO SARCOIDOSIS: ICD-10-CM

## 2020-02-17 DIAGNOSIS — H20.9 UVEITIC GLAUCOMA, BILATERAL, SEVERE STAGE: ICD-10-CM

## 2020-02-17 DIAGNOSIS — H26.491 POSTERIOR CAPSULAR OPACIFICATION VISUALLY SIGNIFICANT, RIGHT EYE: ICD-10-CM

## 2020-02-17 DIAGNOSIS — H47.292 POSTINFLAMMATORY OPTIC ATROPHY, LEFT: ICD-10-CM

## 2020-02-17 DIAGNOSIS — H21.523 PAS (PERIPH ANTERIOR SYNECHIAE), BILATERAL: ICD-10-CM

## 2020-02-17 DIAGNOSIS — H57.09 RELATIVE AFFERENT PUPILLARY DEFECT: ICD-10-CM

## 2020-02-17 DIAGNOSIS — D86.9 SARCOIDOSIS: Primary | ICD-10-CM

## 2020-02-17 DIAGNOSIS — H40.43X3 UVEITIC GLAUCOMA, BILATERAL, SEVERE STAGE: ICD-10-CM

## 2020-02-17 DIAGNOSIS — H26.491 PCO (POSTERIOR CAPSULAR OPACIFICATION), RIGHT: ICD-10-CM

## 2020-02-17 DIAGNOSIS — Z96.1 PSEUDOPHAKIA OF BOTH EYES: ICD-10-CM

## 2020-02-17 DIAGNOSIS — D86.0 SARCOIDOSIS OF LUNG: ICD-10-CM

## 2020-02-17 PROCEDURE — 3078F PR MOST RECENT DIASTOLIC BLOOD PRESSURE < 80 MM HG: ICD-10-PCS | Mod: CPTII,S$GLB,, | Performed by: INTERNAL MEDICINE

## 2020-02-17 PROCEDURE — 3074F SYST BP LT 130 MM HG: CPT | Mod: CPTII,S$GLB,, | Performed by: INTERNAL MEDICINE

## 2020-02-17 PROCEDURE — 99999 PR PBB SHADOW E&M-EST. PATIENT-LVL II: CPT | Mod: PBBFAC,,, | Performed by: INTERNAL MEDICINE

## 2020-02-17 PROCEDURE — 92250 FUNDUS PHOTOGRAPHY W/I&R: CPT | Mod: S$GLB,,, | Performed by: OPHTHALMOLOGY

## 2020-02-17 PROCEDURE — 99499 RISK ADDL DX/OHS AUDIT: ICD-10-PCS | Mod: S$GLB,,, | Performed by: INTERNAL MEDICINE

## 2020-02-17 PROCEDURE — 99499 UNLISTED E&M SERVICE: CPT | Mod: S$GLB,,, | Performed by: INTERNAL MEDICINE

## 2020-02-17 PROCEDURE — 3008F PR BODY MASS INDEX (BMI) DOCUMENTED: ICD-10-PCS | Mod: CPTII,S$GLB,, | Performed by: INTERNAL MEDICINE

## 2020-02-17 PROCEDURE — 92083 EXTENDED VISUAL FIELD XM: CPT | Mod: S$GLB,,, | Performed by: OPHTHALMOLOGY

## 2020-02-17 PROCEDURE — 99999 PR PBB SHADOW E&M-EST. PATIENT-LVL II: ICD-10-PCS | Mod: PBBFAC,,, | Performed by: INTERNAL MEDICINE

## 2020-02-17 PROCEDURE — 99999 PR PBB SHADOW E&M-EST. PATIENT-LVL II: ICD-10-PCS | Mod: PBBFAC,,, | Performed by: OPHTHALMOLOGY

## 2020-02-17 PROCEDURE — 99214 PR OFFICE/OUTPT VISIT, EST, LEVL IV, 30-39 MIN: ICD-10-PCS | Mod: S$GLB,,, | Performed by: INTERNAL MEDICINE

## 2020-02-17 PROCEDURE — 92014 PR EYE EXAM, EST PATIENT,COMPREHESV: ICD-10-PCS | Mod: S$GLB,,, | Performed by: OPHTHALMOLOGY

## 2020-02-17 PROCEDURE — 3074F PR MOST RECENT SYSTOLIC BLOOD PRESSURE < 130 MM HG: ICD-10-PCS | Mod: CPTII,S$GLB,, | Performed by: INTERNAL MEDICINE

## 2020-02-17 PROCEDURE — 92083 HUMPHREY VISUAL FIELD - OU - BOTH EYES: ICD-10-PCS | Mod: S$GLB,,, | Performed by: OPHTHALMOLOGY

## 2020-02-17 PROCEDURE — 99999 PR PBB SHADOW E&M-EST. PATIENT-LVL II: CPT | Mod: PBBFAC,,, | Performed by: OPHTHALMOLOGY

## 2020-02-17 PROCEDURE — 92014 COMPRE OPH EXAM EST PT 1/>: CPT | Mod: S$GLB,,, | Performed by: OPHTHALMOLOGY

## 2020-02-17 PROCEDURE — 3078F DIAST BP <80 MM HG: CPT | Mod: CPTII,S$GLB,, | Performed by: INTERNAL MEDICINE

## 2020-02-17 PROCEDURE — 92250 COLOR FUNDUS PHOTOGRAPHY - OU - BOTH EYES: ICD-10-PCS | Mod: S$GLB,,, | Performed by: OPHTHALMOLOGY

## 2020-02-17 PROCEDURE — 3008F BODY MASS INDEX DOCD: CPT | Mod: CPTII,S$GLB,, | Performed by: INTERNAL MEDICINE

## 2020-02-17 PROCEDURE — 99214 OFFICE O/P EST MOD 30 MIN: CPT | Mod: S$GLB,,, | Performed by: INTERNAL MEDICINE

## 2020-02-17 NOTE — PROGRESS NOTES
HVF done;rel/fix/OD good OS Poor coop. Good/chart checked for latex allergy.-[facility]

## 2020-02-17 NOTE — PROGRESS NOTES
"Subjective:       Patient ID: Jaguar Lackey is a 60 y.o. male.    Chief Complaint: Sarcoidosis    HPI:  Jaguar Lackey is a 60 y.o. 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    He was started on MTX as steroid sparing agent and tolerated it well but decided to stop it in April or May 2018.  Currently still off 4 tabs MTX and folic acid 1 mg daily      Patient saw ophthamologist in November 2019 and there was no inflammation per patient report.  He saw pulmonary recently and felt to do well.          Review of Systems   Constitutional: Negative for fatigue, fever and unexpected weight change.   HENT: Negative.    Eyes:        Dry eyes   Respiratory: Negative for shortness of breath.    Cardiovascular: Negative.    Gastrointestinal: Negative.    Endocrine: Negative.    Genitourinary: Negative.    Musculoskeletal: Negative.    Skin: Negative.    Allergic/Immunologic: Negative.    Neurological: Negative.    Hematological: Negative.    Psychiatric/Behavioral: Negative.          Objective:   /78 (BP Location: Left arm, Patient Position: Sitting, BP Method: Medium (Automatic))   Pulse (!) 59   Ht 5' 9" (1.753 m)   Wt 86.2 kg (190 lb 0.6 oz)   BMI 28.06 kg/m²      Physical Exam   Constitutional: He is oriented to person, place, and time and well-developed, well-nourished, and in no distress.   HENT:   Head: Normocephalic and atraumatic.   Eyes: Conjunctivae and EOM are normal.   Neck: Neck supple.   Cardiovascular: Normal rate, regular rhythm and normal heart sounds.    Pulmonary/Chest: Effort normal and breath sounds normal.   Abdominal: Soft. Bowel sounds are normal.   Neurological: He is alert and oriented to person, place, and time. Gait normal.   Skin: Skin is warm and dry.     Psychiatric: Mood and affect normal.   Musculoskeletal:   28 joint count: 0 swollen and 0 tender            LABS    Component      " Latest Ref Rng & Units 11/25/2019   Sodium      136 - 145 mmol/L 140   Potassium      3.5 - 5.1 mmol/L 4.3   Chloride      95 - 110 mmol/L 105   CO2      23 - 29 mmol/L 28   Glucose      70 - 110 mg/dL 85   BUN, Bld      6 - 20 mg/dL 12   Creatinine      0.5 - 1.4 mg/dL 0.9   Calcium      8.7 - 10.5 mg/dL 10.0   PROTEIN TOTAL      6.0 - 8.4 g/dL 7.8   Albumin      3.5 - 5.2 g/dL 4.2   BILIRUBIN TOTAL      0.1 - 1.0 mg/dL 0.6   Alkaline Phosphatase      55 - 135 U/L 66   AST      10 - 40 U/L 21   ALT      10 - 44 U/L 33   Anion Gap      8 - 16 mmol/L 7 (L)   eGFR if African American      >60 mL/min/1.73 m:2 >60   eGFR if non African American      >60 mL/min/1.73 m:2 >60   Cholesterol      120 - 199 mg/dL 186   Triglycerides      30 - 150 mg/dL 90   HDL      40 - 75 mg/dL 45   LDL Cholesterol External      63.0 - 159.0 mg/dL 123.0   Hdl/Cholesterol Ratio      20.0 - 50.0 % 24.2   Total Cholesterol/HDL Ratio      2.0 - 5.0 4.1   Non-HDL Cholesterol      mg/dL 141   Vit D, 25-Hydroxy      30 - 96 ng/mL 34   PSA, SCREEN      0.00 - 4.00 ng/mL 5.8 (H)       Assessment:       1. Sarcoidosis involving the lungs and the eyes. Most prominent is eye involvement.   No skin or joint involvement at this time. Possible skin involvement in the past.   Still off MTX as immunosuppression since 2018 that was used to help with eye involvement. Humira denied by insurance.   2. HTN. On medication   3. History of bilateral knee injections in the past.  4. Vitamin D deficiency. Managed by primary doctor.   5. Left shoulder pain. Improved some.  6. SOB with exercise.   7. Elevated PSA.  Had TURP that showed prostate was a little enlarged and biopsy was done per patient.  Plan:       1. Hold MTX per patient request.  2. Consider restarting MTX in the future if Dr. Pratt eye specialist felt it was necessary.  3. Humira denied by insurance.   4. Restart exercise     RTO 12 months/prn

## 2020-02-17 NOTE — PROGRESS NOTES
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 (History)            Assessment /Plan     For exam results, see Encounter Report.    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 Uveitic glaucoma - Both Eyes OD>OS  2/2 sarcoidosis  Old pt of Dr. Wagner and Dr. Derian Avila HVF 2012  First photos 2011     Family history   neg  Glaucoma meds   Off all drops and diamox 2013 to 2014  (pt lost insurance)(( had +++ progression off gtts)                              back on gtts combigan/azopt OU and latanoprost OS  H/O adverse rxn to glaucoma drops  None  LASERS   yag cap od 4/4/2019 - did not help  "  GLAUCOMA SURGERIES   Trab OD (3/20/ 2010 -[facility]), Bleb Revision OD  3/21/2012 [facility]// complex phaco/Baerveldt od 9/24/2014  OTHER EYE SURGERIES   PPV/Endo O5, CE OS (04)  CDR  0.95/0.5  Tbase  ?    Tmax  36/22 on meds      Ttarget   16/20       HVF  10-2 stim III OD (3/5/12)-sup arc,inf alt, tiny residual island OD; 24-2 OS w/ marked general depression             3/4/2013 ---HVF 10-2 stim III OD - SAD / Inf Alt los s            OD - 7 test 10-2 stim V - 2014 to 2020  - Ext - + prog             OS - 6 test 6604-8702   24-2 - stim V OS  // Inf Alt loss/SAD  - stable     Gonio -  PAS OD - 360 // OS -  f    CCT  525/531  OCT  6 test 2011 to 2019   - dec. S/I/N od // Dec S/N/I   HRT  8 test 2012 to 2019  - MR -  Dec. S/T, bord I/N od // xx os /// CDR 9.78 od // xx os  Disc photos  2011, 2013, 2014, 2015, 2018, 2020   - OIS     Ttoday 11/13   ((down from 23/16 - now off steroid gtts od ))  Test done today  HVF 10-2 stim V od and 24-2 stim V os // DFE // photos      2. Secondary iritis - Both Eyes      3. 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 Postinflammatory optic atrophy - Left Eye   -Sarcoid, + APD, uncertain etiology     5. Relative afferent pupillary defect OS     6. Steroid responders borderline glaucoma - Both Eyes -increased IOP w/ Pred Acetate. Does better w/ Vexol     7. Legal blindness - Both EyeS OD based on VF loss and OS based on VA.     8. Post-operative state - Both Eyes   -s/p bleb revision OD (3/21/12) had extensive subconj fibrosis, but eventually has done well.     9 Pseudophakic OD  S/P complex phaco/IOL / baerveldt od 9/24/2014   PC IOL os - done years ago - ? [facility] - ? IOL sewn in        Plan:  Uveitic glaucoma  Angle closure - PAS ou   Lost to F/U for 10 months from 9/2013 to 7/2014  - VA od went  from 20/40 to LP  Was already CF at 3 ft os- 2/2 ON pallor from sarcoid  IOP OD was 64 - off drops since 9/2013 - when re-presented     Pt is "legally blind" - he may want to apply " for disability.    Pt previously had issues with insurance, but now has new insurance.  Discussed with patient that the vision loss od is irreversible 2/2 glaucoma and the importance of compliance with gtt/meds.      Developed  anterior chamber fibrin OD  Post GDD  - it has has resolved - but prone to iritis with any intervention     Pt has seen the pulmonologist  Has seen an internist   Has  seen rheumatology     Was  on MTX for sarcoid and iritis - off - stopped on his own - seems to be doing ok (since 5/30/2018)  No increase in inflammationn od on 7/30/2018 // still ok off steroid gtts 11/12/2018    - Cont combigan  bid ou - increase to tid   - cont  dorzolamide OU -  Tid   - cont latanoprost - - OS only     Complains of blurry vision OS that is worsening --? Band keratopathy? Can refer for removal w/ cornea   - seen by cristine 5/2/2017 - not felt to be VS     CSM - glaucoma gtts   Stay off steroid gtts for now      yag cap od - 4/4/2019 - did NOT improve the vision     Dry eyes   Gel gtts q hs os and ointment q hs os     F/U 3 months with   Gonio     Pt is getting training (first step) at the [employer] - says it is going well.- uses a mobility cane

## 2020-02-17 NOTE — LETTER
February 17, 2020      Carloz Desir Jr., MD  [address]  [place] [address]           [facility] - Rheumatology  [address]  [place] [address]  Phone: [phone]  Fax: [phone]          Patient: Jaguar Lackey   MR Number: [id number]   YOB: 1960   Date of Visit: 2/17/2020       Dear Dr. Carloz Desir Jr.:    Thank you for referring Jaguar Lackey to me for evaluation. Attached you will find relevant portions of my assessment and plan of care.    If you have questions, please do not hesitate to call me. I look forward to following Jaguar Lackey along with you.    Sincerely,    Chhaya Desouza MD    Enclosure  CC:  No Recipients    If you would like to receive this communication electronically, please contact [email or web address] or [phone] to request more information on [employer] Link access.    For providers and/or their staff who would like to refer a patient to [facility], please contact us through our one-stop-shop provider referral line, [facility], at [phone].    If you feel you have received this communication in error or would no longer like to receive these types of communications, please e-mail [email or web address]

## 2020-03-03 ENCOUNTER — OFFICE VISIT (OUTPATIENT)
Dept: UROLOGY | Facility: CLINIC | Age: 60
End: 2020-03-03
Payer: COMMERCIAL

## 2020-03-03 VITALS — HEART RATE: 63 BPM | DIASTOLIC BLOOD PRESSURE: 84 MMHG | SYSTOLIC BLOOD PRESSURE: 139 MMHG

## 2020-03-03 DIAGNOSIS — C61 PROSTATE CANCER: Primary | ICD-10-CM

## 2020-03-03 PROCEDURE — 99999 PR PBB SHADOW E&M-EST. PATIENT-LVL II: ICD-10-PCS | Mod: PBBFAC,,, | Performed by: UROLOGY

## 2020-03-03 PROCEDURE — 99214 PR OFFICE/OUTPT VISIT, EST, LEVL IV, 30-39 MIN: ICD-10-PCS | Mod: S$GLB,,, | Performed by: UROLOGY

## 2020-03-03 PROCEDURE — 3079F PR MOST RECENT DIASTOLIC BLOOD PRESSURE 80-89 MM HG: ICD-10-PCS | Mod: CPTII,S$GLB,, | Performed by: UROLOGY

## 2020-03-03 PROCEDURE — 99499 UNLISTED E&M SERVICE: CPT | Mod: S$GLB,,, | Performed by: UROLOGY

## 2020-03-03 PROCEDURE — 3079F DIAST BP 80-89 MM HG: CPT | Mod: CPTII,S$GLB,, | Performed by: UROLOGY

## 2020-03-03 PROCEDURE — 99499 RISK ADDL DX/OHS AUDIT: ICD-10-PCS | Mod: S$GLB,,, | Performed by: UROLOGY

## 2020-03-03 PROCEDURE — 99999 PR PBB SHADOW E&M-EST. PATIENT-LVL II: CPT | Mod: PBBFAC,,, | Performed by: UROLOGY

## 2020-03-03 PROCEDURE — 99214 OFFICE O/P EST MOD 30 MIN: CPT | Mod: S$GLB,,, | Performed by: UROLOGY

## 2020-03-03 PROCEDURE — 3075F SYST BP GE 130 - 139MM HG: CPT | Mod: CPTII,S$GLB,, | Performed by: UROLOGY

## 2020-03-03 PROCEDURE — 3075F PR MOST RECENT SYSTOLIC BLOOD PRESS GE 130-139MM HG: ICD-10-PCS | Mod: CPTII,S$GLB,, | Performed by: UROLOGY

## 2020-03-03 NOTE — PROGRESS NOTES
Subjective:      Patient ID: Jaguar Lackey is a 60 y.o. male.    Chief Complaint: biopsy results.    HPI  Patient is a 60 y.o. male who is established to our clinic and referred by their PCP, Dr. Desir for evaluation of an elevated psa.   He underwent a TRUS biopsy on 1/29/20---no post-biopsy problems.     TRUS Volume: 52cc    PATHOLOGY:  Final Pathologic Diagnosis 1.  Prostate, left apex (biopsy):   Benign prostatic glands and stroma     2.  Prostate, left middle (biopsy):   Benign prostatic glands and stroma     3.  Prostate, left base (biopsy):   Benign seminal vesicles and prostatic glands and stroma     4.  Prostate, right apex (biopsy):   Atypical small acinar proliferation     5.  Prostate, right middle (biopsy):   Benign prostatic glands and stroma     6.  Prostate, right base (biopsy):   Prostatic adenocarcinoma, Baldomero score 3+ 3 = 6   Tumor involves 1 of 2 tissue cores   Tumor comprises less than 5% of submitted tissue            Lab Results   Component Value Date    PSA 5.8 (H) 11/25/2019    PSA 3.6 09/10/2018    PSA 2.4 02/16/2016    PSA 3.1 02/16/2015    PSA 2.18 07/12/2013    PSA 3.07 10/29/2012    PSA 2.1 04/08/2011    PSA 2.0 08/16/2010    PSA 2.3 01/04/2010    PSA 1.4 12/26/2008         Review of Systems   All other systems reviewed and negative except pertinent positives noted in HPI.    Objective:      Physical Exam   Constitutional: He is oriented to person, place, and time. He appears well-developed and well-nourished. No distress.   HENT:   Head: Normocephalic and atraumatic.   Eyes: No scleral icterus.   Neck: No tracheal deviation present.   Pulmonary/Chest: Effort normal. No respiratory distress.   Neurological: He is alert and oriented to person, place, and time.   Psychiatric: He has a normal mood and affect. His behavior is normal. Judgment and thought content normal.       Assessment:       1. Prostate cancer        Plan:     1. Prostate cancer        No orders of the defined types  were placed in this encounter.      1. Prostate cancer:  -discussed pathology  -discussed treatment options, primarily XRT, surgery, or active surveillance.    -given very low risk prostate CA, recommended active surveillance.   Patient will follow-up with a PSA in 6 months.    I spent 25 minutes with the patient of which more than half was spent in direct consultation with the patient in regards to our treatment and plan.

## 2020-03-28 ENCOUNTER — PATIENT MESSAGE (OUTPATIENT)
Dept: PULMONOLOGY | Facility: CLINIC | Age: 60
End: 2020-03-28

## 2020-03-30 ENCOUNTER — TELEPHONE (OUTPATIENT)
Dept: PULMONOLOGY | Facility: CLINIC | Age: 60
End: 2020-03-30

## 2020-03-30 NOTE — TELEPHONE ENCOUNTER
Spoke with patient on this morning, informed Mr. Lackey that I have received his message through his portal, was calling to discuss. Patient has informed me that he went out to the COVID 19 testing center on Saturday, patient told that it will be 3-5 days for results. Patient states that he has has a dry cough every now & again not often, often, also wheezing he says every now & again, on yesterday Sunday he had a temp of 101.0, had not check it on this morning but placed me on a brief hold so that he can check, patient temp on this morning was 98.9 on had water on this morning so that shouldn't affect temp reading. On last night around 7pm a Tylenol was taking . Patient says that he feel ok, but knows he has some other underlying conditions. I've informed Mr. Lackey that to be on the safe side he have already been tested for the COVID 19 and that he should wait on results. Patient informed to contact us at the office if he should have any other symptoms and questions or concerns. Patient have verbalized that he understand.

## 2020-04-09 ENCOUNTER — TELEPHONE (OUTPATIENT)
Dept: INTERNAL MEDICINE | Facility: CLINIC | Age: 60
End: 2020-04-09

## 2020-04-09 DIAGNOSIS — E78.5 HYPERLIPIDEMIA, UNSPECIFIED HYPERLIPIDEMIA TYPE: ICD-10-CM

## 2020-04-09 DIAGNOSIS — U07.1 COVID-19 VIRUS INFECTION: Primary | ICD-10-CM

## 2020-04-09 NOTE — TELEPHONE ENCOUNTER
----- Message from Aaliyah Wang sent at 4/9/2020 12:11 PM CDT -----  Contact: [facility] [phone]   Patient tested positive 18 days ago and needs to know next steps.    Presently patient has no symptoms.

## 2020-04-11 NOTE — TELEPHONE ENCOUNTER
Spoke with pt.  He is doing well.  Let's get him some blood work this week and set me up a virtual visit with him

## 2020-04-15 ENCOUNTER — LAB VISIT (OUTPATIENT)
Dept: LAB | Facility: HOSPITAL | Age: 60
End: 2020-04-15
Attending: INTERNAL MEDICINE
Payer: MEDICARE

## 2020-04-15 ENCOUNTER — PATIENT MESSAGE (OUTPATIENT)
Dept: INTERNAL MEDICINE | Facility: CLINIC | Age: 60
End: 2020-04-15

## 2020-04-15 DIAGNOSIS — E78.5 HYPERLIPIDEMIA, UNSPECIFIED HYPERLIPIDEMIA TYPE: ICD-10-CM

## 2020-04-15 DIAGNOSIS — U07.1 COVID-19 VIRUS INFECTION: ICD-10-CM

## 2020-04-15 LAB
ALBUMIN SERPL BCP-MCNC: 3.9 G/DL (ref 3.5–5.2)
ALP SERPL-CCNC: 64 U/L (ref 55–135)
ALT SERPL W/O P-5'-P-CCNC: 27 U/L (ref 10–44)
ANION GAP SERPL CALC-SCNC: 7 MMOL/L (ref 8–16)
AST SERPL-CCNC: 19 U/L (ref 10–40)
BASOPHILS # BLD AUTO: 0.05 K/UL (ref 0–0.2)
BASOPHILS NFR BLD: 0.8 % (ref 0–1.9)
BILIRUB SERPL-MCNC: 0.5 MG/DL (ref 0.1–1)
BUN SERPL-MCNC: 12 MG/DL (ref 6–20)
CALCIUM SERPL-MCNC: 9.8 MG/DL (ref 8.7–10.5)
CHLORIDE SERPL-SCNC: 106 MMOL/L (ref 95–110)
CHOLEST SERPL-MCNC: 176 MG/DL (ref 120–199)
CHOLEST/HDLC SERPL: 4 {RATIO} (ref 2–5)
CO2 SERPL-SCNC: 28 MMOL/L (ref 23–29)
CREAT SERPL-MCNC: 0.9 MG/DL (ref 0.5–1.4)
DIFFERENTIAL METHOD: ABNORMAL
EOSINOPHIL # BLD AUTO: 0.2 K/UL (ref 0–0.5)
EOSINOPHIL NFR BLD: 2.9 % (ref 0–8)
ERYTHROCYTE [DISTWIDTH] IN BLOOD BY AUTOMATED COUNT: 13.9 % (ref 11.5–14.5)
EST. GFR  (AFRICAN AMERICAN): >60 ML/MIN/1.73 M^2
EST. GFR  (NON AFRICAN AMERICAN): >60 ML/MIN/1.73 M^2
GLUCOSE SERPL-MCNC: 95 MG/DL (ref 70–110)
HCT VFR BLD AUTO: 47.3 % (ref 40–54)
HDLC SERPL-MCNC: 44 MG/DL (ref 40–75)
HDLC SERPL: 25 % (ref 20–50)
HGB BLD-MCNC: 14.8 G/DL (ref 14–18)
IMM GRANULOCYTES # BLD AUTO: 0.03 K/UL (ref 0–0.04)
IMM GRANULOCYTES NFR BLD AUTO: 0.5 % (ref 0–0.5)
LDLC SERPL CALC-MCNC: 112.4 MG/DL (ref 63–159)
LYMPHOCYTES # BLD AUTO: 3.2 K/UL (ref 1–4.8)
LYMPHOCYTES NFR BLD: 48.6 % (ref 18–48)
MCH RBC QN AUTO: 26.9 PG (ref 27–31)
MCHC RBC AUTO-ENTMCNC: 31.3 G/DL (ref 32–36)
MCV RBC AUTO: 86 FL (ref 82–98)
MONOCYTES # BLD AUTO: 0.7 K/UL (ref 0.3–1)
MONOCYTES NFR BLD: 11.2 % (ref 4–15)
NEUTROPHILS # BLD AUTO: 2.4 K/UL (ref 1.8–7.7)
NEUTROPHILS NFR BLD: 36 % (ref 38–73)
NONHDLC SERPL-MCNC: 132 MG/DL
NRBC BLD-RTO: 0 /100 WBC
PLATELET # BLD AUTO: 360 K/UL (ref 150–350)
PMV BLD AUTO: 9.3 FL (ref 9.2–12.9)
POTASSIUM SERPL-SCNC: 4.3 MMOL/L (ref 3.5–5.1)
PROT SERPL-MCNC: 7.9 G/DL (ref 6–8.4)
RBC # BLD AUTO: 5.51 M/UL (ref 4.6–6.2)
SODIUM SERPL-SCNC: 141 MMOL/L (ref 136–145)
TRIGL SERPL-MCNC: 98 MG/DL (ref 30–150)
WBC # BLD AUTO: 6.54 K/UL (ref 3.9–12.7)

## 2020-04-15 PROCEDURE — 80061 LIPID PANEL: CPT

## 2020-04-15 PROCEDURE — 80053 COMPREHEN METABOLIC PANEL: CPT

## 2020-04-15 PROCEDURE — 36415 COLL VENOUS BLD VENIPUNCTURE: CPT

## 2020-04-15 PROCEDURE — 85025 COMPLETE CBC W/AUTO DIFF WBC: CPT

## 2020-04-17 ENCOUNTER — OFFICE VISIT (OUTPATIENT)
Dept: INTERNAL MEDICINE | Facility: CLINIC | Age: 60
End: 2020-04-17
Payer: MEDICARE

## 2020-04-17 DIAGNOSIS — E55.9 VITAMIN D DEFICIENCY DISEASE: ICD-10-CM

## 2020-04-17 DIAGNOSIS — E78.5 HYPERLIPIDEMIA, UNSPECIFIED HYPERLIPIDEMIA TYPE: Primary | ICD-10-CM

## 2020-04-17 DIAGNOSIS — D86.0 SARCOIDOSIS OF LUNG: ICD-10-CM

## 2020-04-17 DIAGNOSIS — U07.1 COVID-19: ICD-10-CM

## 2020-04-17 DIAGNOSIS — D86.9 SARCOIDOSIS: ICD-10-CM

## 2020-04-17 DIAGNOSIS — I10 HYPERTENSION, UNSPECIFIED TYPE: ICD-10-CM

## 2020-04-17 PROCEDURE — 99214 PR OFFICE/OUTPT VISIT, EST, LEVL IV, 30-39 MIN: ICD-10-PCS | Mod: 95,,, | Performed by: INTERNAL MEDICINE

## 2020-04-17 PROCEDURE — 99214 OFFICE O/P EST MOD 30 MIN: CPT | Mod: 95,,, | Performed by: INTERNAL MEDICINE

## 2020-04-17 PROCEDURE — 99499 UNLISTED E&M SERVICE: CPT | Mod: 95,,, | Performed by: INTERNAL MEDICINE

## 2020-04-17 PROCEDURE — 99499 RISK ADDL DX/OHS AUDIT: ICD-10-PCS | Mod: 95,,, | Performed by: INTERNAL MEDICINE

## 2020-04-17 NOTE — PROGRESS NOTES
The patient location is: home  The chief complaint leading to consultation is: follow up   Visit type: audiovisual  Total time spent with patient: 18 minutes   Each patient to whom he or she provides medical services by telemedicine is:  (1) informed of the relationship between the physician and patient and the respective role of any other health care provider with respect to management of the patient; and (2) notified that he or she may decline to receive medical services by telemedicine and may withdraw from such care at any time.    Notes:    Mr. Lackey is a 60 -year-old gentleman coming   in today to follow up on his ongoing medical problems to include:      He was diagnosed with COVID back in March.  Diagnosis on 28th.  David says he is doing well.  He is breathing well.  He has been walking 1 or 2 miles a day without any difficulty.  He denies any wheeze or shortness of breath or GI complaints.  He denies any smell or taste changes.     1. Hypertension. He is on Norvasc 10 mg a day. He has had hypertension   for several years.He is not checking his bp at home.l  . no med change. He denies any   chest pain, short-windedness, palpitations, nausea or vomiting.        2. He has hyperlipidemia. He is on pravastatin 40 mg a day. In Feb his chol was 176, HDL 44, .4          3. He has vitamin D deficiency with history of sarcoidosis. We put him on   vitamin D. He is on a 2 x monthly vitamin D. In Nov 2019  his Vit was 33-  he continues on his vitamin-D.- and he is taking one a week-        4. sarcoidosis. 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    He was started on MTX as steroid sparing agent and is tolerating it well.  Currently off 4 tabs MTX and folic acid 1 mg daily He denies any respiratory symptoms including wheezing or exercise intolerance. He went to  [facility] ([facility]) . I reviewedthose notes.   Other problems include   glaucoma. He is seeing Ophthalmology .  for glaucoma and he is on  multiple drops for this.        He is tolerating the pandemic relatively well as far as shelter and support.    Unfortunately had a niece passed away with COVID.            ROS : Gen - no fatigue or significant weight change  Eyes - no eye pain or visual changes  ENT - no hoarseness or sore throat  CV - No chest pain NO palpitations.   Pulm - no cough or wheezing-- otherwise as above.   GI - no N/V/D   no dysuria or incontinence  MS - no joint pain or muscle pain  Skin - no rash, or c/o of skin lesions  Neuro - no HA, dizziness--- memory is doing well.   Heme - no abnormal bleeding or bruising  Endo - no polydipsia, or temperature changes  Psych - no anxiety or depression         PHYSICAL EXAMINATION:     There were no vitals taken for this visit.          GENERAL: He is a well-appearing 59-year-old gentleman in no acute   distress.  Poor vision- Legally blind  Ear examination was unremarkable.   Eyes- sclera is injected but normal for him- no ptosis.   NECK: Supple. He has no JVD. Thyroid is not enlarged. Chest- CTA bilaterally.   CARDIOVASCULAR: S1, S2. Regular rate and rhythm.   LUNGS: Clear bilaterally- no wheeze.   ABDOMEN: Soft, nontender, no hepatosplenomegaly, no guarding, rebound or   tenderness. Slight distended.   LOWER EXTREMITIES: No edema. He also complains of some tinnitus today. NO hair on legs.       ASSESSMENT:      1. Hypertension, -continue the  Amlodipine  10 mg a day   2. Hyperlipidemia Continue the pravastatin   3. Sarcoidosis, Reviewed notes from [facility],--He is set up to follow with [facility] and Optho, but has not seen Pulm since 2018- so will make referral   4. Vitamin D deficiency.- low   continue every 1 weeks.    5.  COVID infection.  Resolving.  He wants to go back to work April 27th and request a note for this.  I will send him a note for this.

## 2020-04-20 PROBLEM — U07.1 COVID-19: Status: ACTIVE | Noted: 2020-04-20

## 2020-04-23 ENCOUNTER — PATIENT MESSAGE (OUTPATIENT)
Dept: INTERNAL MEDICINE | Facility: CLINIC | Age: 60
End: 2020-04-23

## 2020-04-24 ENCOUNTER — PATIENT MESSAGE (OUTPATIENT)
Dept: INTERNAL MEDICINE | Facility: CLINIC | Age: 60
End: 2020-04-24

## 2020-05-28 NOTE — PROGRESS NOTES
HPI     DLS: 02/13/2020    Pt here for gonio. Pt reports no sig changes in [facility] since last visit   however seems to be getting more cloudy. No other ocular complaints.     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 (History)              Assessment /Plan     For exam results, see Encounter Report.    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 Uveitic glaucoma - Both Eyes OD>OS  2/2 sarcoidosis  Old pt of Dr. Wagner and Dr. Menard  First HVF 2012  First photos 2011     Family history   neg  Glaucoma meds   Off all drops and diamox 2013 to 2014  (pt lost insurance)(( had +++ progression off gtts)                              back on gtts combigan/azopt OU and latanoprost OS  H/O adverse rxn to glaucoma drops  None  LASERS   yag cap od 4/4/2019 - did not help   GLAUCOMA SURGERIES   Trab OD (3/20/ 2010 -Derian), Bleb Revision OD  3/21/2012 [facility]// complex phaco/Baerveldt od 9/24/2014  OTHER EYE SURGERIES   PPV/Endo O5, CE OS (04)  CDR  0.95/0.5  Tbase  ?    Tmax  36/22 on meds      Ttarget   16/20       HVF  10-2  "stim III OD (3/5/12)-sup arc,inf alt, tiny residual island OD; 24-2 OS w/ marked general depression             3/4/2013 ---HVF 10-2 stim III OD - SAD / Inf Alt los s            OD - 7 test 10-2 stim V - 2014 to 2020  - Ext - + prog             OS - 6 test 9172-4295   24-2 - stim V OS  // Inf Alt loss/SAD  - stable     Gonio -  PAS OD - 360 // OS -  f    CCT  525/531  OCT  6 test 2011 to 2019   - dec. S/I/N od // Dec S/N/I   HRT  8 test 2012 to 2019  - MR -  Dec. S/T, bord I/N od // xx os /// CDR 9.78 od // xx os  Disc photos  2011, 2013, 2014, 2015, 2018, 2020   - OIS     Ttoday 16//18  ((down from 23/16 - now off steroid gtts od ))  Test done today IOP and AC check     2. Secondary iritis - Both Eyes      3. 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 Postinflammatory optic atrophy - Left Eye   -Sarcoid, + APD, uncertain etiology     5. Relative afferent pupillary defect OS     6. Steroid responders borderline glaucoma - Both Eyes -increased IOP w/ Pred Acetate. Does better w/ Vexol     7. Legal blindness - Both EyeS OD based on VF loss and OS based on VA.     8. Post-operative state - Both Eyes   -s/p bleb revision OD (3/21/12) had extensive subconj fibrosis, but eventually has done well.     9 Pseudophakic OD  S/P complex phaco/IOL / baerveldt od 9/24/2014   PC IOL os - done years ago - ? [facility] - ? IOL sewn in        Plan:  Uveitic glaucoma  Angle closure - PAS ou   Lost to F/U for 10 months from 9/2013 to 7/2014  - [facility] od went  from 20/40 to LP  Was already CF at 3 ft os- 2/2 ON pallor from sarcoid  IOP OD was 64 - off drops since 9/2013 - when re-presented     Pt is "legally blind" - he may want to apply for disability.    Pt previously had issues with insurance, but now has new insurance.  Discussed with patient that the vision loss od is irreversible 2/2 glaucoma and the importance of compliance with gtt/meds.      Developed  anterior chamber fibrin OD  Post GDD  - it has has resolved - but " prone to iritis with any intervention     Pt has seen the pulmonologist  Has seen an internist   Has  seen rheumatology     Was  on MTX for sarcoid and iritis - off - stopped on his own - seems to be doing ok (since 5/30/2018)  No increase in inflammationn od on 7/30/2018 // still ok off steroid gtts 11/12/2018    - Cont combigan  bid ou - increase to tid   - cont  dorzolamide OU -  Tid   - cont latanoprost - - OS only     Complains of blurry vision OS that is worsening --? Band keratopathy? Can refer for removal w/ cornea   - seen by cristine 5/2/2017 - not felt to be VS     CSM - glaucoma gtts   Stay off steroid gtts for now      yag cap od - 4/4/2019 - did NOT improve the vision     Dry eyes   Gel gtts q hs os and ointment q hs os     F/U 3 months with DFE OU // OCT     Pt is getting training (first step) at [facility] [employer] [facility] - says it is going well.- uses a mobility cane

## 2020-06-01 ENCOUNTER — OFFICE VISIT (OUTPATIENT)
Dept: OPHTHALMOLOGY | Facility: CLINIC | Age: 60
End: 2020-06-01
Payer: COMMERCIAL

## 2020-06-01 DIAGNOSIS — H40.43X3 UVEITIC GLAUCOMA, BILATERAL, SEVERE STAGE: Primary | ICD-10-CM

## 2020-06-01 DIAGNOSIS — H21.523 PAS (PERIPH ANTERIOR SYNECHIAE), BILATERAL: ICD-10-CM

## 2020-06-01 DIAGNOSIS — H54.8 LEGAL BLINDNESS: ICD-10-CM

## 2020-06-01 DIAGNOSIS — H18.422 BAND KERATOPATHY, LEFT: ICD-10-CM

## 2020-06-01 DIAGNOSIS — Z96.1 PSEUDOPHAKIA OF BOTH EYES: ICD-10-CM

## 2020-06-01 DIAGNOSIS — D86.83 IRIDOCYCLITIS DUE TO SARCOIDOSIS: ICD-10-CM

## 2020-06-01 DIAGNOSIS — H47.292 POSTINFLAMMATORY OPTIC ATROPHY, LEFT: ICD-10-CM

## 2020-06-01 DIAGNOSIS — H20.9 UVEITIC GLAUCOMA, BILATERAL, SEVERE STAGE: Primary | ICD-10-CM

## 2020-06-01 DIAGNOSIS — H40.043 STEROID RESPONDERS BORDERLINE GLAUCOMA, BILATERAL: ICD-10-CM

## 2020-06-01 PROCEDURE — 99999 PR PBB SHADOW E&M-EST. PATIENT-LVL II: ICD-10-PCS | Mod: PBBFAC,,, | Performed by: OPHTHALMOLOGY

## 2020-06-01 PROCEDURE — 92012 INTRM OPH EXAM EST PATIENT: CPT | Mod: S$GLB,,, | Performed by: OPHTHALMOLOGY

## 2020-06-01 PROCEDURE — 92012 PR EYE EXAM, EST PATIENT,INTERMED: ICD-10-PCS | Mod: S$GLB,,, | Performed by: OPHTHALMOLOGY

## 2020-06-01 PROCEDURE — 99999 PR PBB SHADOW E&M-EST. PATIENT-LVL II: CPT | Mod: PBBFAC,,, | Performed by: OPHTHALMOLOGY

## 2020-06-01 PROCEDURE — 92020 GONIOSCOPY: CPT | Mod: S$GLB,,, | Performed by: OPHTHALMOLOGY

## 2020-06-01 PROCEDURE — 92020 PR SPECIAL EYE EVAL,GONIOSCOPY: ICD-10-PCS | Mod: S$GLB,,, | Performed by: OPHTHALMOLOGY

## 2020-06-01 NOTE — LETTER
June 1, 2020      [facility] Ophthal[facility]  [address]  [facility] [address]  Phone: [phone]  Fax: [phone]       Patient: Jaguar Lackey   YOB: 1960  Date of Visit: 06/01/2020    To Whom It May Concern:    Sherita Lackey  was at [facility] on 06/01/2020. He may return to work/school on 06/01/2020 with no restrictions. If you have any questions or concerns, or if I can be of further assistance, please do not hesitate to contact me.    Sincerely,    Honey Marroquin

## 2020-07-30 ENCOUNTER — PATIENT MESSAGE (OUTPATIENT)
Dept: INTERNAL MEDICINE | Facility: CLINIC | Age: 60
End: 2020-07-30

## 2020-08-03 ENCOUNTER — OFFICE VISIT (OUTPATIENT)
Dept: PULMONOLOGY | Facility: CLINIC | Age: 60
End: 2020-08-03
Payer: COMMERCIAL

## 2020-08-03 VITALS
BODY MASS INDEX: 27.13 KG/M2 | OXYGEN SATURATION: 98 % | WEIGHT: 183.19 LBS | HEART RATE: 58 BPM | SYSTOLIC BLOOD PRESSURE: 124 MMHG | HEIGHT: 69 IN | DIASTOLIC BLOOD PRESSURE: 68 MMHG

## 2020-08-03 DIAGNOSIS — D86.0 SARCOIDOSIS OF LUNG: ICD-10-CM

## 2020-08-03 DIAGNOSIS — R06.02 SHORTNESS OF BREATH: Primary | ICD-10-CM

## 2020-08-03 PROCEDURE — 99214 PR OFFICE/OUTPT VISIT, EST, LEVL IV, 30-39 MIN: ICD-10-PCS | Mod: S$GLB,,, | Performed by: NURSE PRACTITIONER

## 2020-08-03 PROCEDURE — 99214 OFFICE O/P EST MOD 30 MIN: CPT | Mod: S$GLB,,, | Performed by: NURSE PRACTITIONER

## 2020-08-03 PROCEDURE — 99999 PR PBB SHADOW E&M-EST. PATIENT-LVL III: CPT | Mod: PBBFAC,,, | Performed by: NURSE PRACTITIONER

## 2020-08-03 PROCEDURE — 3078F DIAST BP <80 MM HG: CPT | Mod: CPTII,S$GLB,, | Performed by: NURSE PRACTITIONER

## 2020-08-03 PROCEDURE — 3008F BODY MASS INDEX DOCD: CPT | Mod: CPTII,S$GLB,, | Performed by: NURSE PRACTITIONER

## 2020-08-03 PROCEDURE — 3074F PR MOST RECENT SYSTOLIC BLOOD PRESSURE < 130 MM HG: ICD-10-PCS | Mod: CPTII,S$GLB,, | Performed by: NURSE PRACTITIONER

## 2020-08-03 PROCEDURE — 99999 PR PBB SHADOW E&M-EST. PATIENT-LVL III: ICD-10-PCS | Mod: PBBFAC,,, | Performed by: NURSE PRACTITIONER

## 2020-08-03 PROCEDURE — 3008F PR BODY MASS INDEX (BMI) DOCUMENTED: ICD-10-PCS | Mod: CPTII,S$GLB,, | Performed by: NURSE PRACTITIONER

## 2020-08-03 PROCEDURE — 3078F PR MOST RECENT DIASTOLIC BLOOD PRESSURE < 80 MM HG: ICD-10-PCS | Mod: CPTII,S$GLB,, | Performed by: NURSE PRACTITIONER

## 2020-08-03 PROCEDURE — 3074F SYST BP LT 130 MM HG: CPT | Mod: CPTII,S$GLB,, | Performed by: NURSE PRACTITIONER

## 2020-08-03 NOTE — PROGRESS NOTES
Subjective:       Patient ID: Jaguar Lackey is a 60 y.o. male.    Chief Complaint: Wheezing and Shortness of Breath (exertion only)    HPI:   Jaguar Lackey is a 60 y.o. male who presents with follow up for shortness of breath.  His wife reports he has been wheezing in his sleep  Will have a heaviness in his midsternal area 20-30 minutes   Had a + COVID test in mid-March.  Had a low grade temp but through the course of his recovery it has remained low grade.  Never lost taste or smell, did have soaking night sweats.  He had increased sputum production but no coughing.  Still walks, unable to get into gym.  Not back to regularly doing push ups or squats as he had been  Does have times where he feels like after work he just gets wiped out a bit   Started mid July    Review of Systems   Constitutional: Positive for fatigue. Negative for fever, chills, activity change and night sweats.   HENT: Positive for congestion (unilateral in AM). Negative for postnasal drip, rhinorrhea and trouble swallowing.    Eyes: Negative for itching.   Respiratory: Positive for wheezing, dyspnea on extertion (with stair climbing) and somnolence. Negative for cough, hemoptysis, sputum production, choking, chest tightness, shortness of breath and use of rescue inhaler.    Cardiovascular: Negative for chest pain and palpitations.   Genitourinary: Negative for difficulty urinating.   Endocrine: Negative for cold intolerance and heat intolerance.    Musculoskeletal: Negative for arthralgias.   Skin: Negative for rash.   Gastrointestinal: Negative for nausea, vomiting and acid reflux.   Neurological: Negative for dizziness and light-headedness.   Hematological: Negative for adenopathy.   Psychiatric/Behavioral: Positive for sleep disturbance.         Social History     Tobacco Use    Smoking status: Never Smoker    Smokeless tobacco: Never Used    Tobacco comment: disability since 2013;    Substance Use Topics    Alcohol use: No        Review of patient's allergies indicates:  No Known Allergies  Past Medical History:   Diagnosis Date    Dry eyes     Glaucoma     Hyperlipidemia     Hypertension     Myalgia and myositis 2/16/2016    Sarcoidosis of lung     Uveitis      Past Surgical History:   Procedure Laterality Date    BAERVELDT GLAUCOMA IMPLANT AND CATARACT REMOVAL Right 9/24/14    OD ()    Bleb Revision Right 3/12    OD with Dr. Pratt    CATARACT EXTRACTION W/  INTRAOCULAR LENS IMPLANT Left n/a    Dr. Menard    CATARACT EXTRACTION W/  INTRAOCULAR LENS IMPLANT Right 09/24/2014    WITH JUAN ANTONIOLDT ()    FRACTURE SURGERY      right foot    Needling Bleb Revision   3/12    OD Dr. Pratt    PARS PLANA VITRECTOMY W/ ENDOPHOTOCOAGULATION Left 2005    TRABECULECTOMY Right 2010    OD Dr. Menard     Current Outpatient Medications on File Prior to Visit   Medication Sig    amLODIPine (NORVASC) 10 MG tablet Take 1 tablet (10 mg total) by mouth once daily.    brimonidine-timolol (COMBIGAN) 0.2-0.5 % Drop Place 1 drop into both eyes 3 (three) times daily.    dorzolamide (TRUSOPT) 2 % ophthalmic solution Place 1 drop into both eyes 3 (three) times daily.    ergocalciferol (ERGOCALCIFEROL) 50,000 unit Cap TAKE ONE CAPSULE BY MOUTH ONE TIME PER WEEK    latanoprost 0.005 % ophthalmic solution Place 1 drop into the left eye every evening.    pravastatin (PRAVACHOL) 40 MG tablet TAKE 1 TABLET BY MOUTH EVERY DAY     No current facility-administered medications on file prior to visit.        Objective:      Vitals:    08/03/20 1332   BP: 124/68   Pulse: (!) 58     Physical Exam   Constitutional: He is oriented to person, place, and time. He appears well-developed and well-nourished. No distress.   HENT:   Head: Normocephalic.   Neck: Normal range of motion. Neck supple.   Cardiovascular: Normal rate and regular rhythm.   No murmur heard.  Pulmonary/Chest: Normal expansion, symmetric chest wall expansion, effort  normal and breath sounds normal. No respiratory distress. He has no decreased breath sounds. He has no wheezes. He has no rhonchi. He has no rales.   Abdominal: Soft. He exhibits no distension. There is no hepatosplenomegaly. There is no abdominal tenderness.   Musculoskeletal: Normal range of motion.         General: No edema.   Lymphadenopathy:     He has no cervical adenopathy.   Neurological: He is alert and oriented to person, place, and time. Gait normal.   Skin: Skin is warm and dry. No cyanosis. Nails show no clubbing.   Psychiatric: He has a normal mood and affect.   Vitals reviewed.    Personal Diagnostic Review    PFTs personally reviewed and discussed with patient      Pulmonary Function Tests 8/3/2020   SpO2 98   Height 69   Weight 2931.24   BMI (Calculated) 27   Some recent data might be hidden         Assessment:     Problem List Items Addressed This Visit        Immunology/Multi System    Sarcoidosis of lung    Overview     Off of steroids and MTX for some time.  Has been stable for many years.    Await CT and walking test to assess for changes from baseline         Current Assessment & Plan     Hold off for PFTs for now.  Await results.  Monitor. Wheezing may be sequelae of COVID-19, may need a sleep study.            Other    Dyspnea - Primary    Relevant Orders    CT Chest Without Contrast    Stress test, pulmonary

## 2020-08-06 NOTE — ASSESSMENT & PLAN NOTE
Hold off for PFTs for now.  Await results.  Monitor. Wheezing may be sequelae of COVID-19, may need a sleep study.

## 2020-08-07 NOTE — TELEPHONE ENCOUNTER
Looks like his lung doctor just ordered him a CT of his chest but I do not see that he has scheduled this yet.  Also he has a PSA ordered by his urologist that looks like it should be done around later this month.  Can you call help him set these up.

## 2020-09-08 ENCOUNTER — TELEPHONE (OUTPATIENT)
Dept: INTERNAL MEDICINE | Facility: CLINIC | Age: 60
End: 2020-09-08

## 2020-09-08 ENCOUNTER — LAB VISIT (OUTPATIENT)
Dept: LAB | Facility: HOSPITAL | Age: 60
End: 2020-09-08
Attending: UROLOGY
Payer: COMMERCIAL

## 2020-09-08 DIAGNOSIS — C61 PROSTATE CANCER: ICD-10-CM

## 2020-09-08 LAB — COMPLEXED PSA SERPL-MCNC: 5.4 NG/ML (ref 0–4)

## 2020-09-08 PROCEDURE — 84153 ASSAY OF PSA TOTAL: CPT

## 2020-09-08 PROCEDURE — 36415 COLL VENOUS BLD VENIPUNCTURE: CPT

## 2020-09-08 NOTE — TELEPHONE ENCOUNTER
----- Message from Devika Fields sent at 9/8/2020  8:21 AM CDT -----  Contact: patient [phone]  Patient is calling regarding a message sent to you by the pulmonary doctor requesting a ct-scan. Please call patient to discuss.

## 2020-09-08 NOTE — TELEPHONE ENCOUNTER
Called him.  NO answer.  Please see my message from 7/30/2020-- nothing has changed.  Please help daryl get the CT ordered by his pulmonologist  Set up.

## 2020-09-09 ENCOUNTER — PATIENT MESSAGE (OUTPATIENT)
Dept: INTERNAL MEDICINE | Facility: CLINIC | Age: 60
End: 2020-09-09

## 2020-09-09 ENCOUNTER — TELEPHONE (OUTPATIENT)
Dept: PULMONOLOGY | Facility: CLINIC | Age: 60
End: 2020-09-09

## 2020-09-09 DIAGNOSIS — C61 PROSTATE CANCER: Primary | ICD-10-CM

## 2020-09-09 NOTE — TELEPHONE ENCOUNTER
Left patient a message on his voicemail, informing him that I'm contacting him in regards to scheduling him a 6 min walk as well as a CT Scan per AMBER Mclaughlin. I advised patient that if he wants to schedule appointment or if he has nay questions or concerns, he may contact the office. Office number has been provided.

## 2020-09-11 ENCOUNTER — HOSPITAL ENCOUNTER (OUTPATIENT)
Dept: RADIOLOGY | Facility: HOSPITAL | Age: 60
Discharge: HOME | End: 2020-09-11
Attending: NURSE PRACTITIONER
Payer: COMMERCIAL

## 2020-09-11 ENCOUNTER — HOSPITAL ENCOUNTER (OUTPATIENT)
Dept: PULMONOLOGY | Facility: CLINIC | Age: 60
Discharge: HOME | End: 2020-09-11
Payer: COMMERCIAL

## 2020-09-11 VITALS — WEIGHT: 184 LBS | HEIGHT: 69 IN | BODY MASS INDEX: 27.25 KG/M2

## 2020-09-11 DIAGNOSIS — R06.02 SHORTNESS OF BREATH: ICD-10-CM

## 2020-09-11 DIAGNOSIS — H40.43X3 UVEITIC GLAUCOMA, BILATERAL, SEVERE STAGE: Primary | ICD-10-CM

## 2020-09-11 DIAGNOSIS — H20.9 UVEITIC GLAUCOMA, BILATERAL, SEVERE STAGE: Primary | ICD-10-CM

## 2020-09-11 PROCEDURE — 71250 CT THORAX DX C-: CPT | Mod: 26,,, | Performed by: RADIOLOGY

## 2020-09-11 PROCEDURE — 94618 PULMONARY STRESS TESTING: CPT | Mod: S$GLB,,, | Performed by: INTERNAL MEDICINE

## 2020-09-11 PROCEDURE — 71250 CT CHEST WITHOUT CONTRAST: ICD-10-PCS | Mod: 26,,, | Performed by: RADIOLOGY

## 2020-09-11 PROCEDURE — 71250 CT THORAX DX C-: CPT | Mod: TC

## 2020-09-11 PROCEDURE — 94618 PULMONARY STRESS TESTING: ICD-10-PCS | Mod: S$GLB,,, | Performed by: INTERNAL MEDICINE

## 2020-09-11 RX ORDER — BRIMONIDINE TARTRATE, TIMOLOL MALEATE 2; 5 MG/ML; MG/ML
1 SOLUTION/ DROPS OPHTHALMIC 3 TIMES DAILY
Qty: 30 ML | Refills: 3 | Status: SHIPPED | OUTPATIENT
Start: 2020-09-11 | End: 2021-12-15

## 2020-09-11 RX ORDER — LATANOPROST 50 UG/ML
1 SOLUTION/ DROPS OPHTHALMIC NIGHTLY
Qty: 3 BOTTLE | Refills: 3 | Status: SHIPPED | OUTPATIENT
Start: 2020-09-11 | End: 2021-06-24

## 2020-09-11 RX ORDER — DORZOLAMIDE HCL 20 MG/ML
1 SOLUTION/ DROPS OPHTHALMIC 3 TIMES DAILY
Qty: 30 ML | Refills: 3 | Status: SHIPPED | OUTPATIENT
Start: 2020-09-11 | End: 2021-12-16 | Stop reason: SDUPTHER

## 2020-09-14 ENCOUNTER — TELEPHONE (OUTPATIENT)
Dept: PULMONOLOGY | Facility: CLINIC | Age: 60
End: 2020-09-14

## 2020-09-14 RX ORDER — FLUTICASONE FUROATE AND VILANTEROL TRIFENATATE 100; 25 UG/1; UG/1
1 POWDER RESPIRATORY (INHALATION) DAILY
Qty: 60 EACH | Refills: 11 | Status: SHIPPED | OUTPATIENT
Start: 2020-09-14 | End: 2024-03-18

## 2020-09-14 NOTE — PROCEDURES
Jaguar Lackey is a 60 y.o.  male patient, who presents for a 6 minute walk test ordered by AMBER Bernstein.  The diagnosis is Shortness of Breath, Sarcoidosis; S/P COVID pneumonia.  The patient's BMI is 27.2 kg/m2.  Predicted distance (lower limit of normal) is 418.01 meters.      Test Results:    The test was completed without stopping.  The total time walked was 360 seconds.  During walking, the patient reported:  Chest pain.  The patient used no assistive devices during testing.     09/11/2020---------Distance: 389.23 meters (1277 feet)     O2 Sat % Supplemental Oxygen Heart Rate Blood Pressure Nanci Scale   Pre-exercise  (Resting) 97 % Room Air 56 bpm 132/78 mmHg 0   During Exercise 98 % Room Air 76 bpm 135/73 mmHg 2   Post-exercise  (Recovery) 98 % Room Air  57 bpm       Recovery Time: 65 seconds    Performing nurse/tech: Sacha MOCK      PREVIOUS STUDY:   01/22/2015---------Distance: 548.64 meters (1800 feet)       O2 Sat % Supplemental Oxygen Heart Rate Blood Pressure Nanci Scale   Pre-exercise  (Resting) 98 % Room Air 66 bpm 150/90 mmHg 0   During Exercise   91 % Room Air 106 bpm 166/94 mmHg 4   Post-exercise  (Recovery) 98 % Room Air  79 bpm         CLINICAL INTERPRETATION:  Six minute walk distance is 389.23 meters (1277 feet) with light dyspnea.  During exercise, there was no desaturation while breathing room air.  Blood pressure remained stable and Heart rate increased significantly with walking.  Bradycardia was present prior to exercise.  The patient reported non-pulmonary symptoms during exercise.  Since the previous study in January 2015, exercise capacity is significantly worse.  Based upon age and body mass index, exercise capacity is less than predicted.

## 2020-10-14 ENCOUNTER — PATIENT MESSAGE (OUTPATIENT)
Dept: ADMINISTRATIVE | Facility: HOSPITAL | Age: 60
End: 2020-10-14

## 2020-10-14 ENCOUNTER — PATIENT OUTREACH (OUTPATIENT)
Dept: ADMINISTRATIVE | Facility: HOSPITAL | Age: 60
End: 2020-10-14

## 2020-10-14 NOTE — PROGRESS NOTES
Health Maintenance Due   Topic Date Due    Shingles Vaccine (1 of 2) 02/13/2010     I called pt and was unable to LM because the VM box was full.  I sent pt a portal message asking him to check his BP and call the clinic with the new BP reading to update his chart or schedule a nurse visit for BP check.  Chart review completed.

## 2021-01-15 ENCOUNTER — OFFICE VISIT (OUTPATIENT)
Dept: INTERNAL MEDICINE | Facility: CLINIC | Age: 61
End: 2021-01-15
Payer: COMMERCIAL

## 2021-01-15 VITALS
SYSTOLIC BLOOD PRESSURE: 136 MMHG | DIASTOLIC BLOOD PRESSURE: 74 MMHG | HEART RATE: 73 BPM | OXYGEN SATURATION: 96 % | WEIGHT: 196 LBS | BODY MASS INDEX: 29.03 KG/M2 | HEIGHT: 69 IN

## 2021-01-15 DIAGNOSIS — D86.0 SARCOIDOSIS OF LUNG: ICD-10-CM

## 2021-01-15 DIAGNOSIS — C61 PROSTATE CANCER: ICD-10-CM

## 2021-01-15 DIAGNOSIS — H54.8 LEGAL BLINDNESS: ICD-10-CM

## 2021-01-15 DIAGNOSIS — E78.5 HYPERLIPIDEMIA, UNSPECIFIED HYPERLIPIDEMIA TYPE: ICD-10-CM

## 2021-01-15 DIAGNOSIS — E55.9 VITAMIN D DEFICIENCY DISEASE: Primary | ICD-10-CM

## 2021-01-15 DIAGNOSIS — I10 HYPERTENSION, UNSPECIFIED TYPE: ICD-10-CM

## 2021-01-15 PROCEDURE — 99214 PR OFFICE/OUTPT VISIT, EST, LEVL IV, 30-39 MIN: ICD-10-PCS | Mod: S$GLB,,, | Performed by: INTERNAL MEDICINE

## 2021-01-15 PROCEDURE — 3078F PR MOST RECENT DIASTOLIC BLOOD PRESSURE < 80 MM HG: ICD-10-PCS | Mod: CPTII,S$GLB,, | Performed by: INTERNAL MEDICINE

## 2021-01-15 PROCEDURE — 99999 PR PBB SHADOW E&M-EST. PATIENT-LVL III: CPT | Mod: PBBFAC,,, | Performed by: INTERNAL MEDICINE

## 2021-01-15 PROCEDURE — 3075F PR MOST RECENT SYSTOLIC BLOOD PRESS GE 130-139MM HG: ICD-10-PCS | Mod: CPTII,S$GLB,, | Performed by: INTERNAL MEDICINE

## 2021-01-15 PROCEDURE — 99214 OFFICE O/P EST MOD 30 MIN: CPT | Mod: S$GLB,,, | Performed by: INTERNAL MEDICINE

## 2021-01-15 PROCEDURE — 3075F SYST BP GE 130 - 139MM HG: CPT | Mod: CPTII,S$GLB,, | Performed by: INTERNAL MEDICINE

## 2021-01-15 PROCEDURE — 1126F AMNT PAIN NOTED NONE PRSNT: CPT | Mod: S$GLB,,, | Performed by: INTERNAL MEDICINE

## 2021-01-15 PROCEDURE — 99999 PR PBB SHADOW E&M-EST. PATIENT-LVL III: ICD-10-PCS | Mod: PBBFAC,,, | Performed by: INTERNAL MEDICINE

## 2021-01-15 PROCEDURE — 1126F PR PAIN SEVERITY QUANTIFIED, NO PAIN PRESENT: ICD-10-PCS | Mod: S$GLB,,, | Performed by: INTERNAL MEDICINE

## 2021-01-15 PROCEDURE — 3008F PR BODY MASS INDEX (BMI) DOCUMENTED: ICD-10-PCS | Mod: CPTII,S$GLB,, | Performed by: INTERNAL MEDICINE

## 2021-01-15 PROCEDURE — 3008F BODY MASS INDEX DOCD: CPT | Mod: CPTII,S$GLB,, | Performed by: INTERNAL MEDICINE

## 2021-01-15 PROCEDURE — 3078F DIAST BP <80 MM HG: CPT | Mod: CPTII,S$GLB,, | Performed by: INTERNAL MEDICINE

## 2021-02-22 ENCOUNTER — TELEPHONE (OUTPATIENT)
Dept: OPHTHALMOLOGY | Facility: CLINIC | Age: 61
End: 2021-02-22

## 2021-02-25 ENCOUNTER — TELEPHONE (OUTPATIENT)
Dept: OPHTHALMOLOGY | Facility: CLINIC | Age: 61
End: 2021-02-25

## 2021-03-02 ENCOUNTER — TELEPHONE (OUTPATIENT)
Dept: OPHTHALMOLOGY | Facility: CLINIC | Age: 61
End: 2021-03-02

## 2021-03-05 ENCOUNTER — LAB VISIT (OUTPATIENT)
Dept: LAB | Facility: HOSPITAL | Age: 61
End: 2021-03-05
Attending: UROLOGY
Payer: COMMERCIAL

## 2021-03-05 DIAGNOSIS — C61 PROSTATE CANCER: ICD-10-CM

## 2021-03-05 LAB — COMPLEXED PSA SERPL-MCNC: 6.1 NG/ML (ref 0–4)

## 2021-03-05 PROCEDURE — 36415 COLL VENOUS BLD VENIPUNCTURE: CPT | Performed by: UROLOGY

## 2021-03-05 PROCEDURE — 84153 ASSAY OF PSA TOTAL: CPT | Performed by: UROLOGY

## 2021-03-15 ENCOUNTER — OFFICE VISIT (OUTPATIENT)
Dept: UROLOGY | Facility: CLINIC | Age: 61
End: 2021-03-15
Payer: COMMERCIAL

## 2021-03-15 VITALS
SYSTOLIC BLOOD PRESSURE: 137 MMHG | HEIGHT: 69 IN | DIASTOLIC BLOOD PRESSURE: 81 MMHG | HEART RATE: 81 BPM | BODY MASS INDEX: 28.14 KG/M2 | WEIGHT: 190 LBS

## 2021-03-15 DIAGNOSIS — C61 PROSTATE CANCER: Primary | ICD-10-CM

## 2021-03-15 PROCEDURE — 99999 PR PBB SHADOW E&M-EST. PATIENT-LVL III: ICD-10-PCS | Mod: PBBFAC,,, | Performed by: UROLOGY

## 2021-03-15 PROCEDURE — 99214 PR OFFICE/OUTPT VISIT, EST, LEVL IV, 30-39 MIN: ICD-10-PCS | Mod: S$GLB,,, | Performed by: UROLOGY

## 2021-03-15 PROCEDURE — 99999 PR PBB SHADOW E&M-EST. PATIENT-LVL III: CPT | Mod: PBBFAC,,, | Performed by: UROLOGY

## 2021-03-15 PROCEDURE — 3075F SYST BP GE 130 - 139MM HG: CPT | Mod: CPTII,S$GLB,, | Performed by: UROLOGY

## 2021-03-15 PROCEDURE — 3075F PR MOST RECENT SYSTOLIC BLOOD PRESS GE 130-139MM HG: ICD-10-PCS | Mod: CPTII,S$GLB,, | Performed by: UROLOGY

## 2021-03-15 PROCEDURE — 3008F PR BODY MASS INDEX (BMI) DOCUMENTED: ICD-10-PCS | Mod: CPTII,S$GLB,, | Performed by: UROLOGY

## 2021-03-15 PROCEDURE — 1126F PR PAIN SEVERITY QUANTIFIED, NO PAIN PRESENT: ICD-10-PCS | Mod: S$GLB,,, | Performed by: UROLOGY

## 2021-03-15 PROCEDURE — 99214 OFFICE O/P EST MOD 30 MIN: CPT | Mod: S$GLB,,, | Performed by: UROLOGY

## 2021-03-15 PROCEDURE — 3079F PR MOST RECENT DIASTOLIC BLOOD PRESSURE 80-89 MM HG: ICD-10-PCS | Mod: CPTII,S$GLB,, | Performed by: UROLOGY

## 2021-03-15 PROCEDURE — 3008F BODY MASS INDEX DOCD: CPT | Mod: CPTII,S$GLB,, | Performed by: UROLOGY

## 2021-03-15 PROCEDURE — 3079F DIAST BP 80-89 MM HG: CPT | Mod: CPTII,S$GLB,, | Performed by: UROLOGY

## 2021-03-15 PROCEDURE — 1126F AMNT PAIN NOTED NONE PRSNT: CPT | Mod: S$GLB,,, | Performed by: UROLOGY

## 2021-03-22 ENCOUNTER — PATIENT MESSAGE (OUTPATIENT)
Dept: OPHTHALMOLOGY | Facility: CLINIC | Age: 61
End: 2021-03-22

## 2021-04-26 ENCOUNTER — OFFICE VISIT (OUTPATIENT)
Dept: OPHTHALMOLOGY | Facility: CLINIC | Age: 61
End: 2021-04-26
Payer: COMMERCIAL

## 2021-04-26 DIAGNOSIS — H26.491 POSTERIOR CAPSULAR OPACIFICATION VISUALLY SIGNIFICANT, RIGHT EYE: ICD-10-CM

## 2021-04-26 DIAGNOSIS — H40.43X3 UVEITIC GLAUCOMA, BILATERAL, SEVERE STAGE: Primary | ICD-10-CM

## 2021-04-26 DIAGNOSIS — H40.043 STEROID RESPONDERS BORDERLINE GLAUCOMA, BILATERAL: ICD-10-CM

## 2021-04-26 DIAGNOSIS — H47.292 POSTINFLAMMATORY OPTIC ATROPHY, LEFT: ICD-10-CM

## 2021-04-26 DIAGNOSIS — H21.523 PAS (PERIPH ANTERIOR SYNECHIAE), BILATERAL: ICD-10-CM

## 2021-04-26 DIAGNOSIS — D86.83 IRIDOCYCLITIS DUE TO SARCOIDOSIS: ICD-10-CM

## 2021-04-26 DIAGNOSIS — H18.422 BAND KERATOPATHY, LEFT: ICD-10-CM

## 2021-04-26 DIAGNOSIS — H57.09 RELATIVE AFFERENT PUPILLARY DEFECT: ICD-10-CM

## 2021-04-26 DIAGNOSIS — H54.8 LEGAL BLINDNESS: ICD-10-CM

## 2021-04-26 DIAGNOSIS — H20.9 UVEITIC GLAUCOMA, BILATERAL, SEVERE STAGE: Primary | ICD-10-CM

## 2021-04-26 DIAGNOSIS — Z96.1 PSEUDOPHAKIA OF BOTH EYES: ICD-10-CM

## 2021-04-26 PROCEDURE — 92012 PR EYE EXAM, EST PATIENT,INTERMED: ICD-10-PCS | Mod: S$GLB,,, | Performed by: OPHTHALMOLOGY

## 2021-04-26 PROCEDURE — 92133 CPTRZD OPH DX IMG PST SGM ON: CPT | Mod: S$GLB,,, | Performed by: OPHTHALMOLOGY

## 2021-04-26 PROCEDURE — 99999 PR PBB SHADOW E&M-EST. PATIENT-LVL II: CPT | Mod: PBBFAC,,, | Performed by: OPHTHALMOLOGY

## 2021-04-26 PROCEDURE — 1125F AMNT PAIN NOTED PAIN PRSNT: CPT | Mod: S$GLB,,, | Performed by: OPHTHALMOLOGY

## 2021-04-26 PROCEDURE — 1125F PR PAIN SEVERITY QUANTIFIED, PAIN PRESENT: ICD-10-PCS | Mod: S$GLB,,, | Performed by: OPHTHALMOLOGY

## 2021-04-26 PROCEDURE — 99999 PR PBB SHADOW E&M-EST. PATIENT-LVL II: ICD-10-PCS | Mod: PBBFAC,,, | Performed by: OPHTHALMOLOGY

## 2021-04-26 PROCEDURE — 92012 INTRM OPH EXAM EST PATIENT: CPT | Mod: S$GLB,,, | Performed by: OPHTHALMOLOGY

## 2021-04-26 PROCEDURE — 92133 POSTERIOR SEGMENT OCT OPTIC NERVE(OCULAR COHERENCE TOMOGRAPHY) - OU - BOTH EYES: ICD-10-PCS | Mod: S$GLB,,, | Performed by: OPHTHALMOLOGY

## 2021-07-19 ENCOUNTER — PATIENT OUTREACH (OUTPATIENT)
Dept: ADMINISTRATIVE | Facility: OTHER | Age: 61
End: 2021-07-19

## 2021-07-20 ENCOUNTER — OFFICE VISIT (OUTPATIENT)
Dept: OPHTHALMOLOGY | Facility: CLINIC | Age: 61
End: 2021-07-20
Payer: COMMERCIAL

## 2021-07-20 DIAGNOSIS — H26.491 PCO (POSTERIOR CAPSULAR OPACIFICATION), RIGHT: ICD-10-CM

## 2021-07-20 DIAGNOSIS — H40.43X3 UVEITIC GLAUCOMA, BILATERAL, SEVERE STAGE: Primary | ICD-10-CM

## 2021-07-20 DIAGNOSIS — H57.09 RELATIVE AFFERENT PUPILLARY DEFECT: ICD-10-CM

## 2021-07-20 DIAGNOSIS — H18.422 BAND KERATOPATHY, LEFT: ICD-10-CM

## 2021-07-20 DIAGNOSIS — H47.292 POSTINFLAMMATORY OPTIC ATROPHY, LEFT: ICD-10-CM

## 2021-07-20 DIAGNOSIS — H20.9 UVEITIC GLAUCOMA, BILATERAL, SEVERE STAGE: Primary | ICD-10-CM

## 2021-07-20 DIAGNOSIS — H54.8 LEGAL BLINDNESS: ICD-10-CM

## 2021-07-20 DIAGNOSIS — H40.043 STEROID RESPONDERS BORDERLINE GLAUCOMA, BILATERAL: ICD-10-CM

## 2021-07-20 DIAGNOSIS — Z96.1 PSEUDOPHAKIA OF BOTH EYES: ICD-10-CM

## 2021-07-20 DIAGNOSIS — D86.83 IRIDOCYCLITIS DUE TO SARCOIDOSIS: ICD-10-CM

## 2021-07-20 DIAGNOSIS — H21.523 PAS (PERIPH ANTERIOR SYNECHIAE), BILATERAL: ICD-10-CM

## 2021-07-20 PROCEDURE — 1126F AMNT PAIN NOTED NONE PRSNT: CPT | Mod: CPTII,S$GLB,, | Performed by: OPHTHALMOLOGY

## 2021-07-20 PROCEDURE — 1126F PR PAIN SEVERITY QUANTIFIED, NO PAIN PRESENT: ICD-10-PCS | Mod: CPTII,S$GLB,, | Performed by: OPHTHALMOLOGY

## 2021-07-20 PROCEDURE — 92020 PR SPECIAL EYE EVAL,GONIOSCOPY: ICD-10-PCS | Mod: S$GLB,,, | Performed by: OPHTHALMOLOGY

## 2021-07-20 PROCEDURE — 92012 PR EYE EXAM, EST PATIENT,INTERMED: ICD-10-PCS | Mod: S$GLB,,, | Performed by: OPHTHALMOLOGY

## 2021-07-20 PROCEDURE — 99999 PR PBB SHADOW E&M-EST. PATIENT-LVL II: ICD-10-PCS | Mod: PBBFAC,,, | Performed by: OPHTHALMOLOGY

## 2021-07-20 PROCEDURE — 92020 GONIOSCOPY: CPT | Mod: S$GLB,,, | Performed by: OPHTHALMOLOGY

## 2021-07-20 PROCEDURE — 92012 INTRM OPH EXAM EST PATIENT: CPT | Mod: S$GLB,,, | Performed by: OPHTHALMOLOGY

## 2021-07-20 PROCEDURE — 99999 PR PBB SHADOW E&M-EST. PATIENT-LVL II: CPT | Mod: PBBFAC,,, | Performed by: OPHTHALMOLOGY

## 2021-08-26 ENCOUNTER — PATIENT OUTREACH (OUTPATIENT)
Dept: ADMINISTRATIVE | Facility: OTHER | Age: 61
End: 2021-08-26

## 2021-08-27 ENCOUNTER — OFFICE VISIT (OUTPATIENT)
Dept: OPHTHALMOLOGY | Facility: CLINIC | Age: 61
End: 2021-08-27
Payer: COMMERCIAL

## 2021-08-27 DIAGNOSIS — H18.422 BAND KERATOPATHY, LEFT: Primary | ICD-10-CM

## 2021-08-27 DIAGNOSIS — H54.8 LEGAL BLINDNESS: ICD-10-CM

## 2021-08-27 DIAGNOSIS — H40.43X3 UVEITIC GLAUCOMA, BILATERAL, SEVERE STAGE: ICD-10-CM

## 2021-08-27 DIAGNOSIS — H20.9 UVEITIC GLAUCOMA, BILATERAL, SEVERE STAGE: ICD-10-CM

## 2021-08-27 PROCEDURE — 1126F PR PAIN SEVERITY QUANTIFIED, NO PAIN PRESENT: ICD-10-PCS | Mod: CPTII,S$GLB,, | Performed by: OPHTHALMOLOGY

## 2021-08-27 PROCEDURE — 1159F MED LIST DOCD IN RCRD: CPT | Mod: CPTII,S$GLB,, | Performed by: OPHTHALMOLOGY

## 2021-08-27 PROCEDURE — 1126F AMNT PAIN NOTED NONE PRSNT: CPT | Mod: CPTII,S$GLB,, | Performed by: OPHTHALMOLOGY

## 2021-08-27 PROCEDURE — 99214 OFFICE O/P EST MOD 30 MIN: CPT | Mod: S$GLB,,, | Performed by: OPHTHALMOLOGY

## 2021-08-27 PROCEDURE — 99999 PR PBB SHADOW E&M-EST. PATIENT-LVL II: ICD-10-PCS | Mod: PBBFAC,,, | Performed by: OPHTHALMOLOGY

## 2021-08-27 PROCEDURE — 1159F PR MEDICATION LIST DOCUMENTED IN MEDICAL RECORD: ICD-10-PCS | Mod: CPTII,S$GLB,, | Performed by: OPHTHALMOLOGY

## 2021-08-27 PROCEDURE — 99999 PR PBB SHADOW E&M-EST. PATIENT-LVL II: CPT | Mod: PBBFAC,,, | Performed by: OPHTHALMOLOGY

## 2021-08-27 PROCEDURE — 99214 PR OFFICE/OUTPT VISIT, EST, LEVL IV, 30-39 MIN: ICD-10-PCS | Mod: S$GLB,,, | Performed by: OPHTHALMOLOGY

## 2021-09-09 ENCOUNTER — LAB VISIT (OUTPATIENT)
Dept: LAB | Facility: HOSPITAL | Age: 61
End: 2021-09-09
Attending: UROLOGY
Payer: COMMERCIAL

## 2021-09-09 DIAGNOSIS — C61 PROSTATE CANCER: ICD-10-CM

## 2021-09-09 LAB — COMPLEXED PSA SERPL-MCNC: 6.5 NG/ML (ref 0–4)

## 2021-09-09 PROCEDURE — 84153 ASSAY OF PSA TOTAL: CPT | Performed by: UROLOGY

## 2021-09-09 PROCEDURE — 36415 COLL VENOUS BLD VENIPUNCTURE: CPT | Performed by: UROLOGY

## 2021-09-10 DIAGNOSIS — C61 PROSTATE CANCER: Primary | ICD-10-CM

## 2021-09-15 ENCOUNTER — TELEPHONE (OUTPATIENT)
Dept: UROLOGY | Facility: CLINIC | Age: 61
End: 2021-09-15

## 2021-09-24 ENCOUNTER — PROCEDURE VISIT (OUTPATIENT)
Dept: OPHTHALMOLOGY | Facility: CLINIC | Age: 61
End: 2021-09-24
Payer: COMMERCIAL

## 2021-09-24 DIAGNOSIS — H18.422 BAND KERATOPATHY, LEFT: Primary | ICD-10-CM

## 2021-09-24 PROCEDURE — 65436 CURETTE/TREAT CORNEA: CPT | Mod: S$GLB,,, | Performed by: OPHTHALMOLOGY

## 2021-09-24 PROCEDURE — 99499 NO LOS: ICD-10-PCS | Mod: S$GLB,,, | Performed by: OPHTHALMOLOGY

## 2021-09-24 PROCEDURE — 99499 UNLISTED E&M SERVICE: CPT | Mod: S$GLB,,, | Performed by: OPHTHALMOLOGY

## 2021-09-24 PROCEDURE — 65436 PR CURETTE/TREAT CORNEA,APPLY CHELATE: ICD-10-PCS | Mod: S$GLB,,, | Performed by: OPHTHALMOLOGY

## 2021-09-24 RX ORDER — OXYCODONE AND ACETAMINOPHEN 7.5; 325 MG/1; MG/1
1 TABLET ORAL EVERY 6 HOURS PRN
Qty: 10 TABLET | Refills: 0 | Status: SHIPPED | OUTPATIENT
Start: 2021-09-24 | End: 2022-01-21

## 2021-09-24 RX ORDER — OXYCODONE AND ACETAMINOPHEN 7.5; 325 MG/1; MG/1
1 TABLET ORAL EVERY 6 HOURS PRN
Qty: 10 TABLET | Refills: 0 | Status: SHIPPED | OUTPATIENT
Start: 2021-09-24 | End: 2021-09-24 | Stop reason: SDUPTHER

## 2021-10-05 ENCOUNTER — OFFICE VISIT (OUTPATIENT)
Dept: OPHTHALMOLOGY | Facility: CLINIC | Age: 61
End: 2021-10-05
Payer: COMMERCIAL

## 2021-10-05 DIAGNOSIS — H18.422 BAND KERATOPATHY, LEFT: Primary | ICD-10-CM

## 2021-10-05 PROCEDURE — 99999 PR PBB SHADOW E&M-EST. PATIENT-LVL I: CPT | Mod: PBBFAC,,, | Performed by: OPHTHALMOLOGY

## 2021-10-05 PROCEDURE — 1159F MED LIST DOCD IN RCRD: CPT | Mod: CPTII,S$GLB,, | Performed by: OPHTHALMOLOGY

## 2021-10-05 PROCEDURE — 99999 PR PBB SHADOW E&M-EST. PATIENT-LVL I: ICD-10-PCS | Mod: PBBFAC,,, | Performed by: OPHTHALMOLOGY

## 2021-10-05 PROCEDURE — 1159F PR MEDICATION LIST DOCUMENTED IN MEDICAL RECORD: ICD-10-PCS | Mod: CPTII,S$GLB,, | Performed by: OPHTHALMOLOGY

## 2021-10-05 PROCEDURE — 99024 POSTOP FOLLOW-UP VISIT: CPT | Mod: S$GLB,,, | Performed by: OPHTHALMOLOGY

## 2021-10-05 PROCEDURE — 99024 PR POST-OP FOLLOW-UP VISIT: ICD-10-PCS | Mod: S$GLB,,, | Performed by: OPHTHALMOLOGY

## 2021-11-04 ENCOUNTER — TELEPHONE (OUTPATIENT)
Dept: INTERNAL MEDICINE | Facility: CLINIC | Age: 61
End: 2021-11-04
Payer: COMMERCIAL

## 2021-11-05 ENCOUNTER — OFFICE VISIT (OUTPATIENT)
Dept: INTERNAL MEDICINE | Facility: CLINIC | Age: 61
End: 2021-11-05
Payer: COMMERCIAL

## 2021-11-05 ENCOUNTER — LAB VISIT (OUTPATIENT)
Dept: LAB | Facility: HOSPITAL | Age: 61
End: 2021-11-05
Payer: COMMERCIAL

## 2021-11-05 ENCOUNTER — IMMUNIZATION (OUTPATIENT)
Dept: INTERNAL MEDICINE | Facility: CLINIC | Age: 61
End: 2021-11-05
Payer: COMMERCIAL

## 2021-11-05 VITALS
HEIGHT: 69 IN | DIASTOLIC BLOOD PRESSURE: 82 MMHG | WEIGHT: 189.38 LBS | SYSTOLIC BLOOD PRESSURE: 128 MMHG | HEART RATE: 60 BPM | BODY MASS INDEX: 28.05 KG/M2 | OXYGEN SATURATION: 99 %

## 2021-11-05 DIAGNOSIS — J84.10 CALCIFIED GRANULOMA OF LUNG: ICD-10-CM

## 2021-11-05 DIAGNOSIS — E78.5 HYPERLIPIDEMIA, UNSPECIFIED HYPERLIPIDEMIA TYPE: ICD-10-CM

## 2021-11-05 DIAGNOSIS — E55.9 VITAMIN D DEFICIENCY DISEASE: ICD-10-CM

## 2021-11-05 DIAGNOSIS — Z23 NEED FOR VACCINATION: Primary | ICD-10-CM

## 2021-11-05 DIAGNOSIS — I10 HYPERTENSION, UNSPECIFIED TYPE: ICD-10-CM

## 2021-11-05 DIAGNOSIS — D86.0 SARCOIDOSIS OF LUNG: ICD-10-CM

## 2021-11-05 DIAGNOSIS — H54.8 LEGAL BLINDNESS: ICD-10-CM

## 2021-11-05 DIAGNOSIS — C61 PROSTATE CANCER: ICD-10-CM

## 2021-11-05 DIAGNOSIS — Z00.00 ENCOUNTER FOR PREVENTIVE HEALTH EXAMINATION: Primary | ICD-10-CM

## 2021-11-05 LAB
CHOLEST SERPL-MCNC: 184 MG/DL (ref 120–199)
CHOLEST/HDLC SERPL: 4.1 {RATIO} (ref 2–5)
HDLC SERPL-MCNC: 45 MG/DL (ref 40–75)
HDLC SERPL: 24.5 % (ref 20–50)
LDLC SERPL CALC-MCNC: 123.8 MG/DL (ref 63–159)
NONHDLC SERPL-MCNC: 139 MG/DL
TRIGL SERPL-MCNC: 76 MG/DL (ref 30–150)

## 2021-11-05 PROCEDURE — 1160F PR REVIEW ALL MEDS BY PRESCRIBER/CLIN PHARMACIST DOCUMENTED: ICD-10-PCS | Mod: CPTII,S$GLB,, | Performed by: NURSE PRACTITIONER

## 2021-11-05 PROCEDURE — 99499 UNLISTED E&M SERVICE: CPT | Mod: S$GLB,,, | Performed by: NURSE PRACTITIONER

## 2021-11-05 PROCEDURE — 3074F PR MOST RECENT SYSTOLIC BLOOD PRESSURE < 130 MM HG: ICD-10-PCS | Mod: CPTII,S$GLB,, | Performed by: NURSE PRACTITIONER

## 2021-11-05 PROCEDURE — 36415 COLL VENOUS BLD VENIPUNCTURE: CPT | Performed by: NURSE PRACTITIONER

## 2021-11-05 PROCEDURE — 99999 PR PBB SHADOW E&M-EST. PATIENT-LVL III: CPT | Mod: PBBFAC,,, | Performed by: NURSE PRACTITIONER

## 2021-11-05 PROCEDURE — 3008F BODY MASS INDEX DOCD: CPT | Mod: CPTII,S$GLB,, | Performed by: NURSE PRACTITIONER

## 2021-11-05 PROCEDURE — 99999 PR PBB SHADOW E&M-EST. PATIENT-LVL III: ICD-10-PCS | Mod: PBBFAC,,, | Performed by: NURSE PRACTITIONER

## 2021-11-05 PROCEDURE — 3079F PR MOST RECENT DIASTOLIC BLOOD PRESSURE 80-89 MM HG: ICD-10-PCS | Mod: CPTII,S$GLB,, | Performed by: NURSE PRACTITIONER

## 2021-11-05 PROCEDURE — 1159F PR MEDICATION LIST DOCUMENTED IN MEDICAL RECORD: ICD-10-PCS | Mod: CPTII,S$GLB,, | Performed by: NURSE PRACTITIONER

## 2021-11-05 PROCEDURE — 1160F RVW MEDS BY RX/DR IN RCRD: CPT | Mod: CPTII,S$GLB,, | Performed by: NURSE PRACTITIONER

## 2021-11-05 PROCEDURE — G0439 PPPS, SUBSEQ VISIT: HCPCS | Mod: S$GLB,,, | Performed by: NURSE PRACTITIONER

## 2021-11-05 PROCEDURE — 3079F DIAST BP 80-89 MM HG: CPT | Mod: CPTII,S$GLB,, | Performed by: NURSE PRACTITIONER

## 2021-11-05 PROCEDURE — 1159F MED LIST DOCD IN RCRD: CPT | Mod: CPTII,S$GLB,, | Performed by: NURSE PRACTITIONER

## 2021-11-05 PROCEDURE — 80061 LIPID PANEL: CPT | Performed by: NURSE PRACTITIONER

## 2021-11-05 PROCEDURE — 0064A COVID-19, MRNA, LNP-S, PF, 100 MCG/0.25 ML DOSE VACCINE (MODERNA BOOSTER): CPT | Mod: CV19,PBBFAC | Performed by: INTERNAL MEDICINE

## 2021-11-05 PROCEDURE — 3074F SYST BP LT 130 MM HG: CPT | Mod: CPTII,S$GLB,, | Performed by: NURSE PRACTITIONER

## 2021-11-05 PROCEDURE — G0439 PR MEDICARE ANNUAL WELLNESS SUBSEQUENT VISIT: ICD-10-PCS | Mod: S$GLB,,, | Performed by: NURSE PRACTITIONER

## 2021-11-05 PROCEDURE — 3008F PR BODY MASS INDEX (BMI) DOCUMENTED: ICD-10-PCS | Mod: CPTII,S$GLB,, | Performed by: NURSE PRACTITIONER

## 2021-11-05 PROCEDURE — 99499 RISK ADDL DX/OHS AUDIT: ICD-10-PCS | Mod: S$GLB,,, | Performed by: NURSE PRACTITIONER

## 2021-12-16 ENCOUNTER — OFFICE VISIT (OUTPATIENT)
Dept: OPHTHALMOLOGY | Facility: CLINIC | Age: 61
End: 2021-12-16
Payer: COMMERCIAL

## 2021-12-16 DIAGNOSIS — H40.043 STEROID RESPONDERS BORDERLINE GLAUCOMA, BILATERAL: ICD-10-CM

## 2021-12-16 DIAGNOSIS — H21.523 PAS (PERIPH ANTERIOR SYNECHIAE), BILATERAL: ICD-10-CM

## 2021-12-16 DIAGNOSIS — H54.8 LEGAL BLINDNESS: ICD-10-CM

## 2021-12-16 DIAGNOSIS — Z96.1 PSEUDOPHAKIA OF BOTH EYES: ICD-10-CM

## 2021-12-16 DIAGNOSIS — H47.292 POSTINFLAMMATORY OPTIC ATROPHY, LEFT: ICD-10-CM

## 2021-12-16 DIAGNOSIS — H40.43X3 UVEITIC GLAUCOMA, BILATERAL, SEVERE STAGE: Primary | ICD-10-CM

## 2021-12-16 DIAGNOSIS — H18.422 BAND KERATOPATHY, LEFT: ICD-10-CM

## 2021-12-16 DIAGNOSIS — H20.9 UVEITIC GLAUCOMA, BILATERAL, SEVERE STAGE: Primary | ICD-10-CM

## 2021-12-16 DIAGNOSIS — D86.83 IRIDOCYCLITIS DUE TO SARCOIDOSIS: ICD-10-CM

## 2021-12-16 PROCEDURE — 99999 PR PBB SHADOW E&M-EST. PATIENT-LVL II: ICD-10-PCS | Mod: PBBFAC,,, | Performed by: OPHTHALMOLOGY

## 2021-12-16 PROCEDURE — 2023F PR DILATED RETINAL EXAM W/O EVID OF RETINOPATHY: ICD-10-PCS | Mod: CPTII,S$GLB,, | Performed by: OPHTHALMOLOGY

## 2021-12-16 PROCEDURE — 92133 CPTRZD OPH DX IMG PST SGM ON: CPT | Mod: S$GLB,,, | Performed by: OPHTHALMOLOGY

## 2021-12-16 PROCEDURE — 92133 POSTERIOR SEGMENT OCT OPTIC NERVE(OCULAR COHERENCE TOMOGRAPHY) - OU - BOTH EYES: ICD-10-PCS | Mod: S$GLB,,, | Performed by: OPHTHALMOLOGY

## 2021-12-16 PROCEDURE — 92083 HUMPHREY VISUAL FIELD - OU - BOTH EYES: ICD-10-PCS | Mod: S$GLB,,, | Performed by: OPHTHALMOLOGY

## 2021-12-16 PROCEDURE — 99999 PR PBB SHADOW E&M-EST. PATIENT-LVL II: CPT | Mod: PBBFAC,,, | Performed by: OPHTHALMOLOGY

## 2021-12-16 PROCEDURE — 2023F DILAT RTA XM W/O RTNOPTHY: CPT | Mod: CPTII,S$GLB,, | Performed by: OPHTHALMOLOGY

## 2021-12-16 PROCEDURE — 99024 POSTOP FOLLOW-UP VISIT: CPT | Mod: S$GLB,,, | Performed by: OPHTHALMOLOGY

## 2021-12-16 PROCEDURE — 92083 EXTENDED VISUAL FIELD XM: CPT | Mod: S$GLB,,, | Performed by: OPHTHALMOLOGY

## 2021-12-16 PROCEDURE — 99024 PR POST-OP FOLLOW-UP VISIT: ICD-10-PCS | Mod: S$GLB,,, | Performed by: OPHTHALMOLOGY

## 2021-12-16 RX ORDER — DORZOLAMIDE HCL 20 MG/ML
1 SOLUTION/ DROPS OPHTHALMIC 3 TIMES DAILY
Qty: 30 ML | Refills: 3 | Status: SHIPPED | OUTPATIENT
Start: 2021-12-16 | End: 2022-08-04

## 2021-12-30 ENCOUNTER — PATIENT MESSAGE (OUTPATIENT)
Dept: OPHTHALMOLOGY | Facility: CLINIC | Age: 61
End: 2021-12-30
Payer: COMMERCIAL

## 2022-01-21 ENCOUNTER — OFFICE VISIT (OUTPATIENT)
Dept: INTERNAL MEDICINE | Facility: CLINIC | Age: 62
End: 2022-01-21
Payer: COMMERCIAL

## 2022-01-21 ENCOUNTER — PATIENT MESSAGE (OUTPATIENT)
Dept: PHARMACY | Facility: CLINIC | Age: 62
End: 2022-01-21
Payer: COMMERCIAL

## 2022-01-21 VITALS
SYSTOLIC BLOOD PRESSURE: 130 MMHG | OXYGEN SATURATION: 97 % | WEIGHT: 191.81 LBS | HEIGHT: 69 IN | HEART RATE: 58 BPM | BODY MASS INDEX: 28.41 KG/M2 | DIASTOLIC BLOOD PRESSURE: 72 MMHG

## 2022-01-21 DIAGNOSIS — H54.8 LEGAL BLINDNESS: ICD-10-CM

## 2022-01-21 DIAGNOSIS — I10 BENIGN ESSENTIAL HTN: Primary | ICD-10-CM

## 2022-01-21 DIAGNOSIS — E78.5 HYPERLIPIDEMIA, UNSPECIFIED HYPERLIPIDEMIA TYPE: ICD-10-CM

## 2022-01-21 DIAGNOSIS — I10 HYPERTENSION, UNSPECIFIED TYPE: ICD-10-CM

## 2022-01-21 DIAGNOSIS — D86.0 SARCOIDOSIS OF LUNG: ICD-10-CM

## 2022-01-21 DIAGNOSIS — Z23 NEED FOR SHINGLES VACCINE: ICD-10-CM

## 2022-01-21 DIAGNOSIS — Z12.11 ENCOUNTER FOR SCREENING COLONOSCOPY: ICD-10-CM

## 2022-01-21 DIAGNOSIS — D86.9 SARCOIDOSIS: ICD-10-CM

## 2022-01-21 DIAGNOSIS — C61 PROSTATE CANCER: ICD-10-CM

## 2022-01-21 PROCEDURE — 99999 PR PBB SHADOW E&M-EST. PATIENT-LVL IV: ICD-10-PCS | Mod: PBBFAC,,, | Performed by: INTERNAL MEDICINE

## 2022-01-21 PROCEDURE — 3008F BODY MASS INDEX DOCD: CPT | Mod: CPTII,S$GLB,, | Performed by: INTERNAL MEDICINE

## 2022-01-21 PROCEDURE — 3078F DIAST BP <80 MM HG: CPT | Mod: CPTII,S$GLB,, | Performed by: INTERNAL MEDICINE

## 2022-01-21 PROCEDURE — 99214 OFFICE O/P EST MOD 30 MIN: CPT | Mod: S$GLB,,, | Performed by: INTERNAL MEDICINE

## 2022-01-21 PROCEDURE — 1159F MED LIST DOCD IN RCRD: CPT | Mod: CPTII,S$GLB,, | Performed by: INTERNAL MEDICINE

## 2022-01-21 PROCEDURE — 1159F PR MEDICATION LIST DOCUMENTED IN MEDICAL RECORD: ICD-10-PCS | Mod: CPTII,S$GLB,, | Performed by: INTERNAL MEDICINE

## 2022-01-21 PROCEDURE — 3078F PR MOST RECENT DIASTOLIC BLOOD PRESSURE < 80 MM HG: ICD-10-PCS | Mod: CPTII,S$GLB,, | Performed by: INTERNAL MEDICINE

## 2022-01-21 PROCEDURE — 3008F PR BODY MASS INDEX (BMI) DOCUMENTED: ICD-10-PCS | Mod: CPTII,S$GLB,, | Performed by: INTERNAL MEDICINE

## 2022-01-21 PROCEDURE — 3075F PR MOST RECENT SYSTOLIC BLOOD PRESS GE 130-139MM HG: ICD-10-PCS | Mod: CPTII,S$GLB,, | Performed by: INTERNAL MEDICINE

## 2022-01-21 PROCEDURE — 99214 PR OFFICE/OUTPT VISIT, EST, LEVL IV, 30-39 MIN: ICD-10-PCS | Mod: S$GLB,,, | Performed by: INTERNAL MEDICINE

## 2022-01-21 PROCEDURE — 99999 PR PBB SHADOW E&M-EST. PATIENT-LVL IV: CPT | Mod: PBBFAC,,, | Performed by: INTERNAL MEDICINE

## 2022-01-21 PROCEDURE — 3075F SYST BP GE 130 - 139MM HG: CPT | Mod: CPTII,S$GLB,, | Performed by: INTERNAL MEDICINE

## 2022-01-21 RX ORDER — ERGOCALCIFEROL 1.25 MG/1
CAPSULE ORAL
Qty: 12 CAPSULE | Refills: 8 | Status: SHIPPED | OUTPATIENT
Start: 2022-01-21 | End: 2023-02-20

## 2022-01-21 RX ORDER — HYDROCHLOROTHIAZIDE 25 MG/1
25 TABLET ORAL DAILY
Qty: 30 TABLET | Refills: 11 | Status: SHIPPED | OUTPATIENT
Start: 2022-01-21 | End: 2022-01-21

## 2022-01-21 RX ORDER — PRAVASTATIN SODIUM 40 MG/1
40 TABLET ORAL DAILY
Qty: 90 TABLET | Refills: 1 | Status: SHIPPED | OUTPATIENT
Start: 2022-01-21 | End: 2022-11-04

## 2022-01-21 RX ORDER — AMLODIPINE BESYLATE 10 MG/1
10 TABLET ORAL DAILY
Qty: 90 TABLET | Refills: 3 | Status: SHIPPED | OUTPATIENT
Start: 2022-01-21 | End: 2023-02-09

## 2022-01-21 NOTE — PROGRESS NOTES
Clinic Note  1/21/2022      Subjective:       Patient ID:  Jaugar is a 61 y.o. male being seen for an established visit.    Chief Complaint: Follow-up    HPI     61 y.o M w hx of sarcoid, legal blindness, uveitis, HTN, HLD, prostate cancer dx (3/2020) presenting for annual visit.  Patient not recording blood pressure at home, but does takes Amlodipine 10 mg daily. Patient with well controlled lipid levels, takes pravastatin. Also history of vitamin D deficiency for which he takes ergocalciferol.   Patient with no real complaints at this time, lives with his wife.  Patient notes he has not followed up with rheumatology for his sarcoidosis, he usually sees Dr. Lyons, last saw her Nov 2020. Has been awhile since follow up with pulmonary.  Not on any treatment for sarcoid, just surveillance.  Patient has been seen by urology for prostate cancer, prostate was not removed, not on active treatment, will follow up with urologist this March. 6.5 on last PSA.    Diet: patient does not eat a lot of fast food. Eats high salt food diet. Never smoker, says he was subject to a lot of second hand smoke. Glass of wine every now and then.  Patient is sexually active. Not exercising anymore, has not in the past year.    Patient notes he has got all his COVID vaccines including booster. Also got the flu shot. Has not had the shingles vaccine.    Patient had colonoscopy back in 2012 and was suggest 10 year follow up colonoscopy in report.        Review of Systems   Constitutional: Negative for chills, fever and weight loss.   HENT: Negative for sore throat.    Eyes: Negative for pain.   Respiratory: Positive for cough (tickle in throat, had to get glass of water, resolved). Negative for shortness of breath.    Cardiovascular: Positive for leg swelling (sometimes). Negative for chest pain and palpitations.   Gastrointestinal: Negative for constipation, diarrhea, heartburn, nausea and vomiting.   Genitourinary: Negative for dysuria.    Musculoskeletal: Positive for falls (mechanical fall recently).   Neurological: Negative for headaches.   Psychiatric/Behavioral: Negative for depression. The patient is not nervous/anxious.        Past Medical History:   Diagnosis Date    Dry eyes     Glaucoma     Hyperlipidemia     Hypertension     Myalgia and myositis 2/16/2016    Prostate cancer 1/15/2021    Sarcoidosis of lung     Uveitis        Family History   Problem Relation Age of Onset    Heart disease Father     No Known Problems Mother     No Known Problems Brother     Blindness Maternal Uncle     Glaucoma Paternal Aunt     No Known Problems Sister     No Known Problems Maternal Aunt     No Known Problems Paternal Uncle     No Known Problems Maternal Grandmother     No Known Problems Maternal Grandfather     No Known Problems Paternal Grandmother     No Known Problems Paternal Grandfather     No Known Problems Daughter     No Known Problems Son     No Known Problems Sister     Hypertension Sister     No Known Problems Brother     No Known Problems Brother     Hypertension Brother     Cancer Neg Hx     Macular degeneration Neg Hx     Retinal detachment Neg Hx     Rheum arthritis Neg Hx     Lupus Neg Hx     Inflammatory bowel disease Neg Hx     Psoriasis Neg Hx     Amblyopia Neg Hx     Cataracts Neg Hx     Strabismus Neg Hx     Stroke Neg Hx     Thyroid disease Neg Hx         reports that he has never smoked. He has never used smokeless tobacco. He reports that he does not drink alcohol and does not use drugs.    Medication List with Changes/Refills   New Medications    VARICELLA-ZOSTER GE-AS01B, PF, (SHINGRIX, PF,) 50 MCG/0.5 ML INJECTION    Inject 0.5 mLs into the muscle once. for 1 dose   Current Medications    COMBIGAN 0.2-0.5 % DROP    INSTILL 1 DROP INTO BOTH EYES 3 TIMES A DAY    DORZOLAMIDE (TRUSOPT) 2 % OPHTHALMIC SOLUTION    Place 1 drop into both eyes 3 (three) times daily.    FLUTICASONE  "FUROATE-VILANTEROL (BREO ELLIPTA) 100-25 MCG/DOSE DISKUS INHALER    Inhale 1 puff into the lungs once daily. Controller    LATANOPROST 0.005 % OPHTHALMIC SOLUTION    PLACE 1 DROP INTO THE LEFT EYE EVERY EVENING.    MULTIVITAMIN CAPSULE    Take 1 capsule by mouth once daily.   Changed and/or Refilled Medications    Modified Medication Previous Medication    AMLODIPINE (NORVASC) 10 MG TABLET amLODIPine (NORVASC) 10 MG tablet       Take 1 tablet (10 mg total) by mouth once daily.    TAKE 1 TABLET BY MOUTH EVERY DAY    ERGOCALCIFEROL (ERGOCALCIFEROL) 50,000 UNIT CAP ergocalciferol (ERGOCALCIFEROL) 50,000 unit Cap       1 po every week    TAKE 1 CAPSULE BY MOUTH ONE TIME PER WEEK    PRAVASTATIN (PRAVACHOL) 40 MG TABLET pravastatin (PRAVACHOL) 40 MG tablet       Take 1 tablet (40 mg total) by mouth once daily.    TAKE 1 TABLET BY MOUTH EVERY DAY   Discontinued Medications    OXYCODONE-ACETAMINOPHEN (PERCOCET) 7.5-325 MG PER TABLET    Take 1 tablet by mouth every 6 (six) hours as needed for Pain.     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    Weight as of this encounter: 87 kg (191 lb 12.8 oz).  Physical Exam  Vitals reviewed.   Constitutional:       Appearance: He is normal " weight.   HENT:      Head: Normocephalic.      Right Ear: External ear normal.      Left Ear: External ear normal.   Cardiovascular:      Rate and Rhythm: Regular rhythm. Bradycardia present.      Pulses: Normal pulses.      Heart sounds: Normal heart sounds. No murmur heard.      Pulmonary:      Effort: Pulmonary effort is normal. No respiratory distress.      Breath sounds: Normal breath sounds. No wheezing or rales.   Abdominal:      General: Abdomen is flat. Bowel sounds are normal. There is no distension.      Palpations: Abdomen is soft.   Musculoskeletal:         General: No swelling or tenderness. Normal range of motion.      Cervical back: No rigidity.   Skin:     General: Skin is warm and dry.      Coloration: Skin is not jaundiced.   Neurological:      Mental Status: He is alert and oriented to person, place, and time.   Psychiatric:         Mood and Affect: Mood normal.         Behavior: Behavior normal.           Assessment and Plan:         Jaguar was seen today for follow-up.    Diagnoses and all orders for this visit:    Benign essential HTN  -     Comprehensive Metabolic Panel; Future  -Blood pressure at goal of 130/72 on repeat BP check. Can consider adding HCTZ diuretic if elevated in future, will hold off for now.  -Continue Amlodipine 10 mg daily.  -patient not monitoring BP at home    Encounter for screening colonoscopy  -     Case Request Endoscopy: COLONOSCOPY  -Patient with colonoscopy in 2012, recommended 10 year follow up colonscopy    Hyperlipidemia, unspecified hyperlipidemia type  -Patient with good control of lipids and cholesterol  -Continue Pravastatin 40 mg daily    Sarcoidosis  -F/u with Rheumatology--referral placed    Need for shingles vaccine  -patient agreeable to shingles vaccine, will plan for today to get first dose.    Prostate cancer  -urology follow-up already scheduled for f/u PSA check  Sarcoidosis of lung  -     Ambulatory referral/consult to Rheumatology;  Future    Vitamin D deficiency  -continue Ergocalciferol    Legal blindness - Both Eyes  -f/u with ophthalmology scheduled    Other orders  -     Discontinue: hydroCHLOROthiazide (HYDRODIURIL) 25 MG tablet; Take 1 tablet (25 mg total) by mouth once daily.  -     amLODIPine (NORVASC) 10 MG tablet; Take 1 tablet (10 mg total) by mouth once daily.  -     ergocalciferol (ERGOCALCIFEROL) 50,000 unit Cap; 1 po every week  -     pravastatin (PRAVACHOL) 40 MG tablet; Take 1 tablet (40 mg total) by mouth once daily.    Follow up with Dr. Desir in 6 months.    No follow-ups on file.    Other Orders Placed This Visit:  Orders Placed This Encounter   Procedures    Comprehensive Metabolic Panel    Ambulatory referral/consult to Rheumatology         James Lara MD  Internal Medicine PGY-1

## 2022-01-24 NOTE — PROGRESS NOTES
"  I have personally taken the history and examined this patient and agree with the resident's note as stated above with the following thoughts:  /72   Pulse (!) 58   Ht 5' 9" (1.753 m)   Wt 87 kg (191 lb 12.8 oz)   SpO2 97%   BMI 28.32 kg/m²     Discussed getting vaccines up to date.  Discussed importance of low fat diet and exercise.  Healthcare gaps closed.  Vision poor- can see outlines only.  He is in good spirits today.  Labs form November reviewed.  I will see him back in 6 months   "

## 2022-02-02 ENCOUNTER — OFFICE VISIT (OUTPATIENT)
Dept: RHEUMATOLOGY | Facility: CLINIC | Age: 62
End: 2022-02-02
Payer: COMMERCIAL

## 2022-02-02 VITALS
SYSTOLIC BLOOD PRESSURE: 152 MMHG | DIASTOLIC BLOOD PRESSURE: 84 MMHG | BODY MASS INDEX: 28.71 KG/M2 | WEIGHT: 194.44 LBS | HEART RATE: 69 BPM

## 2022-02-02 DIAGNOSIS — D86.0 SARCOIDOSIS OF LUNG: ICD-10-CM

## 2022-02-02 DIAGNOSIS — H54.8 LEGAL BLINDNESS: ICD-10-CM

## 2022-02-02 DIAGNOSIS — D86.9 SARCOIDOSIS: Primary | ICD-10-CM

## 2022-02-02 DIAGNOSIS — C61 PROSTATE CANCER: ICD-10-CM

## 2022-02-02 PROCEDURE — 99999 PR PBB SHADOW E&M-EST. PATIENT-LVL IV: CPT | Mod: PBBFAC,,, | Performed by: INTERNAL MEDICINE

## 2022-02-02 PROCEDURE — 1159F MED LIST DOCD IN RCRD: CPT | Mod: CPTII,S$GLB,, | Performed by: INTERNAL MEDICINE

## 2022-02-02 PROCEDURE — 3077F SYST BP >= 140 MM HG: CPT | Mod: CPTII,S$GLB,, | Performed by: INTERNAL MEDICINE

## 2022-02-02 PROCEDURE — 1160F PR REVIEW ALL MEDS BY PRESCRIBER/CLIN PHARMACIST DOCUMENTED: ICD-10-PCS | Mod: CPTII,S$GLB,, | Performed by: INTERNAL MEDICINE

## 2022-02-02 PROCEDURE — 99215 PR OFFICE/OUTPT VISIT, EST, LEVL V, 40-54 MIN: ICD-10-PCS | Mod: S$GLB,,, | Performed by: INTERNAL MEDICINE

## 2022-02-02 PROCEDURE — 99999 PR PBB SHADOW E&M-EST. PATIENT-LVL IV: ICD-10-PCS | Mod: PBBFAC,,, | Performed by: INTERNAL MEDICINE

## 2022-02-02 PROCEDURE — 1159F PR MEDICATION LIST DOCUMENTED IN MEDICAL RECORD: ICD-10-PCS | Mod: CPTII,S$GLB,, | Performed by: INTERNAL MEDICINE

## 2022-02-02 PROCEDURE — 99215 OFFICE O/P EST HI 40 MIN: CPT | Mod: S$GLB,,, | Performed by: INTERNAL MEDICINE

## 2022-02-02 PROCEDURE — 3077F PR MOST RECENT SYSTOLIC BLOOD PRESSURE >= 140 MM HG: ICD-10-PCS | Mod: CPTII,S$GLB,, | Performed by: INTERNAL MEDICINE

## 2022-02-02 PROCEDURE — 3079F DIAST BP 80-89 MM HG: CPT | Mod: CPTII,S$GLB,, | Performed by: INTERNAL MEDICINE

## 2022-02-02 PROCEDURE — 1160F RVW MEDS BY RX/DR IN RCRD: CPT | Mod: CPTII,S$GLB,, | Performed by: INTERNAL MEDICINE

## 2022-02-02 PROCEDURE — 3008F BODY MASS INDEX DOCD: CPT | Mod: CPTII,S$GLB,, | Performed by: INTERNAL MEDICINE

## 2022-02-02 PROCEDURE — 3079F PR MOST RECENT DIASTOLIC BLOOD PRESSURE 80-89 MM HG: ICD-10-PCS | Mod: CPTII,S$GLB,, | Performed by: INTERNAL MEDICINE

## 2022-02-02 PROCEDURE — 3008F PR BODY MASS INDEX (BMI) DOCUMENTED: ICD-10-PCS | Mod: CPTII,S$GLB,, | Performed by: INTERNAL MEDICINE

## 2022-02-04 NOTE — PROGRESS NOTES
History of present illness:  61-year-old gentleman with sarcoidosis involving the eye and lung.  He normally follows with Dr. Lyons.  He has not been seen for 2 years.  He was told he needs a rheumatology appointment and the appointment was made with me.  He was previously treated with prednisone and methotrexate but has been on no treatment recently.    He was 1st diagnosed with sarcoid in the 1990s.  He presented at that time with uveitis.  He had just been evaluated by Ophthalmology and has had no active uveitis.  He has had no recent eye redness or pain.  He does have foggy vision.  He is on medication for secondary glaucoma but not for active inflammation.    He apparently had a recent episode of COVID.  His breathing has been doing worse since then.  He has dyspnea on exertion after about 1 flight.  He has had no cough or sputum production.    He has had no unexplained fevers.  He denies any headache.  He has had no rash.  He has had no sinus problems.  He has had no swallowing difficulty.  He has no history of abdominal pain.  He has had no liver problems.  He has had no arthritis or joint swelling.  He has had no numbness or tingling.    He has a history of prostate cancer.  He is on observation only.  He has an upcoming appointment.    Physical examination:  Skin:  No rashes  ENT:  Adequate tears and saliva.  No conjunctivitis or oral ulcers  Chest:  Clear to auscultation and percussion  Cardiac:  No murmurs, gallops, rubs  Abdomen:  No organomegaly or masses.  No tenderness to palpation  Extremities:  No pedal edema  Musculoskeletal:  Full range of motion of all joints.  No synovitis.  No tender areas to palpation.    Assessment:  Sarcoidosis by history without evidence of active disease    Plans:  1. Laboratory studies obtained  2. I have ordered a CT scan of his lungs  3. I have scheduled him for pulmonary function test  4. If workup is negative, he should be followed up in 12 months.

## 2022-03-08 ENCOUNTER — HOSPITAL ENCOUNTER (OUTPATIENT)
Dept: PULMONOLOGY | Facility: CLINIC | Age: 62
Discharge: HOME | End: 2022-03-08
Payer: COMMERCIAL

## 2022-03-08 ENCOUNTER — OFFICE VISIT (OUTPATIENT)
Dept: UROLOGY | Facility: CLINIC | Age: 62
End: 2022-03-08
Payer: COMMERCIAL

## 2022-03-08 ENCOUNTER — LAB VISIT (OUTPATIENT)
Dept: LAB | Facility: HOSPITAL | Age: 62
End: 2022-03-08
Attending: UROLOGY
Payer: COMMERCIAL

## 2022-03-08 VITALS — WEIGHT: 187.63 LBS | BODY MASS INDEX: 27.79 KG/M2 | HEIGHT: 69 IN

## 2022-03-08 DIAGNOSIS — Z01.812 PRE-PROCEDURE LAB EXAM: Primary | ICD-10-CM

## 2022-03-08 DIAGNOSIS — D86.0 SARCOIDOSIS OF LUNG: ICD-10-CM

## 2022-03-08 DIAGNOSIS — N40.1 BENIGN PROSTATIC HYPERPLASIA WITH URINARY OBSTRUCTION: Primary | ICD-10-CM

## 2022-03-08 DIAGNOSIS — C61 PROSTATE CANCER: ICD-10-CM

## 2022-03-08 DIAGNOSIS — N13.8 BENIGN PROSTATIC HYPERPLASIA WITH URINARY OBSTRUCTION: Primary | ICD-10-CM

## 2022-03-08 DIAGNOSIS — I10 BENIGN ESSENTIAL HTN: ICD-10-CM

## 2022-03-08 PROBLEM — N40.0 BPH (BENIGN PROSTATIC HYPERPLASIA): Status: ACTIVE | Noted: 2022-03-08

## 2022-03-08 LAB
ALBUMIN SERPL BCP-MCNC: 4 G/DL (ref 3.5–5.2)
ALP SERPL-CCNC: 63 U/L (ref 55–135)
ALT SERPL W/O P-5'-P-CCNC: 33 U/L (ref 10–44)
ANION GAP SERPL CALC-SCNC: 10 MMOL/L (ref 8–16)
AST SERPL-CCNC: 22 U/L (ref 10–40)
BASOPHILS # BLD AUTO: 0.07 K/UL (ref 0–0.2)
BASOPHILS NFR BLD: 1.1 % (ref 0–1.9)
BILIRUB SERPL-MCNC: 0.5 MG/DL (ref 0.1–1)
BUN SERPL-MCNC: 19 MG/DL (ref 8–23)
CALCIUM SERPL-MCNC: 9.5 MG/DL (ref 8.7–10.5)
CHLORIDE SERPL-SCNC: 106 MMOL/L (ref 95–110)
CO2 SERPL-SCNC: 26 MMOL/L (ref 23–29)
COMPLEXED PSA SERPL-MCNC: 6.5 NG/ML (ref 0–4)
CREAT SERPL-MCNC: 0.9 MG/DL (ref 0.5–1.4)
CRP SERPL-MCNC: 2.8 MG/L (ref 0–8.2)
CTP QC/QA: YES
DIFFERENTIAL METHOD: NORMAL
EOSINOPHIL # BLD AUTO: 0.2 K/UL (ref 0–0.5)
EOSINOPHIL NFR BLD: 2.9 % (ref 0–8)
ERYTHROCYTE [DISTWIDTH] IN BLOOD BY AUTOMATED COUNT: 14 % (ref 11.5–14.5)
ERYTHROCYTE [SEDIMENTATION RATE] IN BLOOD BY WESTERGREN METHOD: 4 MM/HR (ref 0–23)
EST. GFR  (AFRICAN AMERICAN): >60 ML/MIN/1.73 M^2
EST. GFR  (NON AFRICAN AMERICAN): >60 ML/MIN/1.73 M^2
GLUCOSE SERPL-MCNC: 101 MG/DL (ref 70–110)
HCT VFR BLD AUTO: 47.6 % (ref 40–54)
HGB BLD-MCNC: 15.4 G/DL (ref 14–18)
IMM GRANULOCYTES # BLD AUTO: 0.03 K/UL (ref 0–0.04)
IMM GRANULOCYTES NFR BLD AUTO: 0.5 % (ref 0–0.5)
LYMPHOCYTES # BLD AUTO: 2.3 K/UL (ref 1–4.8)
LYMPHOCYTES NFR BLD: 36.9 % (ref 18–48)
MCH RBC QN AUTO: 27.9 PG (ref 27–31)
MCHC RBC AUTO-ENTMCNC: 32.4 G/DL (ref 32–36)
MCV RBC AUTO: 86 FL (ref 82–98)
MONOCYTES # BLD AUTO: 0.7 K/UL (ref 0.3–1)
MONOCYTES NFR BLD: 11.2 % (ref 4–15)
NEUTROPHILS # BLD AUTO: 3 K/UL (ref 1.8–7.7)
NEUTROPHILS NFR BLD: 47.4 % (ref 38–73)
NRBC BLD-RTO: 0 /100 WBC
PLATELET # BLD AUTO: 312 K/UL (ref 150–450)
PMV BLD AUTO: 10 FL (ref 9.2–12.9)
POTASSIUM SERPL-SCNC: 3.9 MMOL/L (ref 3.5–5.1)
PROT SERPL-MCNC: 7.6 G/DL (ref 6–8.4)
RBC # BLD AUTO: 5.52 M/UL (ref 4.6–6.2)
SARS-COV-2 AG RESP QL IA.RAPID: NEGATIVE
SODIUM SERPL-SCNC: 142 MMOL/L (ref 136–145)
WBC # BLD AUTO: 6.23 K/UL (ref 3.9–12.7)

## 2022-03-08 PROCEDURE — 36415 COLL VENOUS BLD VENIPUNCTURE: CPT | Performed by: INTERNAL MEDICINE

## 2022-03-08 PROCEDURE — 1159F MED LIST DOCD IN RCRD: CPT | Mod: CPTII,S$GLB,, | Performed by: UROLOGY

## 2022-03-08 PROCEDURE — 85652 RBC SED RATE AUTOMATED: CPT | Performed by: INTERNAL MEDICINE

## 2022-03-08 PROCEDURE — 99499 RISK ADDL DX/OHS AUDIT: ICD-10-PCS | Mod: S$GLB,,, | Performed by: UROLOGY

## 2022-03-08 PROCEDURE — 94727 PR PULM FUNCTION TEST BY GAS: ICD-10-PCS | Mod: S$GLB,,, | Performed by: INTERNAL MEDICINE

## 2022-03-08 PROCEDURE — 87811 SARS CORONAVIRUS 2 ANTIGEN POCT, MANUAL READ: ICD-10-PCS | Mod: S$GLB,,, | Performed by: INTERNAL MEDICINE

## 2022-03-08 PROCEDURE — 99999 PR PBB SHADOW E&M-EST. PATIENT-LVL III: CPT | Mod: PBBFAC,,, | Performed by: UROLOGY

## 2022-03-08 PROCEDURE — 1160F RVW MEDS BY RX/DR IN RCRD: CPT | Mod: CPTII,S$GLB,, | Performed by: UROLOGY

## 2022-03-08 PROCEDURE — 86140 C-REACTIVE PROTEIN: CPT | Performed by: INTERNAL MEDICINE

## 2022-03-08 PROCEDURE — 94729 DIFFUSING CAPACITY: CPT | Mod: S$GLB,,, | Performed by: INTERNAL MEDICINE

## 2022-03-08 PROCEDURE — 1160F PR REVIEW ALL MEDS BY PRESCRIBER/CLIN PHARMACIST DOCUMENTED: ICD-10-PCS | Mod: CPTII,S$GLB,, | Performed by: UROLOGY

## 2022-03-08 PROCEDURE — 1159F PR MEDICATION LIST DOCUMENTED IN MEDICAL RECORD: ICD-10-PCS | Mod: CPTII,S$GLB,, | Performed by: UROLOGY

## 2022-03-08 PROCEDURE — 94729 PR C02/MEMBANE DIFFUSE CAPACITY: ICD-10-PCS | Mod: S$GLB,,, | Performed by: INTERNAL MEDICINE

## 2022-03-08 PROCEDURE — 94618 PULMONARY STRESS TESTING: ICD-10-PCS | Mod: S$GLB,,, | Performed by: INTERNAL MEDICINE

## 2022-03-08 PROCEDURE — 85025 COMPLETE CBC W/AUTO DIFF WBC: CPT | Performed by: INTERNAL MEDICINE

## 2022-03-08 PROCEDURE — 94010 BREATHING CAPACITY TEST: CPT | Mod: S$GLB,,, | Performed by: INTERNAL MEDICINE

## 2022-03-08 PROCEDURE — 87811 SARS-COV-2 COVID19 W/OPTIC: CPT | Mod: S$GLB,,, | Performed by: INTERNAL MEDICINE

## 2022-03-08 PROCEDURE — 99499 UNLISTED E&M SERVICE: CPT | Mod: S$GLB,,, | Performed by: UROLOGY

## 2022-03-08 PROCEDURE — 99999 PR PBB SHADOW E&M-EST. PATIENT-LVL III: ICD-10-PCS | Mod: PBBFAC,,, | Performed by: UROLOGY

## 2022-03-08 PROCEDURE — 80053 COMPREHEN METABOLIC PANEL: CPT | Performed by: INTERNAL MEDICINE

## 2022-03-08 PROCEDURE — 94618 PULMONARY STRESS TESTING: CPT | Mod: S$GLB,,, | Performed by: INTERNAL MEDICINE

## 2022-03-08 PROCEDURE — 82164 ANGIOTENSIN I ENZYME TEST: CPT | Performed by: INTERNAL MEDICINE

## 2022-03-08 PROCEDURE — 84153 ASSAY OF PSA TOTAL: CPT | Performed by: UROLOGY

## 2022-03-08 PROCEDURE — 94010 BREATHING CAPACITY TEST: ICD-10-PCS | Mod: S$GLB,,, | Performed by: INTERNAL MEDICINE

## 2022-03-08 PROCEDURE — 99214 PR OFFICE/OUTPT VISIT, EST, LEVL IV, 30-39 MIN: ICD-10-PCS | Mod: S$GLB,,, | Performed by: UROLOGY

## 2022-03-08 PROCEDURE — 94727 GAS DIL/WSHOT DETER LNG VOL: CPT | Mod: S$GLB,,, | Performed by: INTERNAL MEDICINE

## 2022-03-08 PROCEDURE — 99214 OFFICE O/P EST MOD 30 MIN: CPT | Mod: S$GLB,,, | Performed by: UROLOGY

## 2022-03-08 RX ORDER — SILODOSIN 4 MG/1
4 CAPSULE ORAL DAILY
Qty: 30 CAPSULE | Refills: 11 | Status: SHIPPED | OUTPATIENT
Start: 2022-03-08 | End: 2023-03-16

## 2022-03-08 NOTE — PROGRESS NOTES
Subjective:      Patient ID: Jaguar Lackey is a 62 y.o. male.    Chief Complaint: prostate cancer/active surveillance.    HPI  Patient is a 62 y.o. male who is established to our clinic and referred by their PCP, Dr. Desir for evaluation of an elevated psa.   He underwent a TRUS biopsy on 1/29/20---no post-biopsy problems.    Showed Baldomero 3+3 prostate cancer as shown below.     TRUS Volume: 52cc    PATHOLOGY:  Final Pathologic Diagnosis 1.  Prostate, left apex (biopsy):   Benign prostatic glands and stroma     2.  Prostate, left middle (biopsy):   Benign prostatic glands and stroma     3.  Prostate, left base (biopsy):   Benign seminal vesicles and prostatic glands and stroma     4.  Prostate, right apex (biopsy):   Atypical small acinar proliferation     5.  Prostate, right middle (biopsy):   Benign prostatic glands and stroma     6.  Prostate, right base (biopsy):   Prostatic adenocarcinoma, Baldomero score 3+ 3 = 6   Tumor involves 1 of 2 tissue cores   Tumor comprises less than 5% of submitted tissue            Lab Results   Component Value Date    PSA 5.8 (H) 11/25/2019    PSA 3.6 09/10/2018    PSA 2.4 02/16/2016    PSA 3.1 02/16/2015    PSA 2.18 07/12/2013    PSA 3.07 10/29/2012    PSA 2.1 04/08/2011    PSA 2.0 08/16/2010    PSA 2.3 01/04/2010    PSA 1.4 12/26/2008    PSADIAG 6.5 (H) 03/08/2022    PSADIAG 6.5 (H) 09/09/2021    PSADIAG 6.1 (H) 03/05/2021    PSADIAG 5.4 (H) 09/08/2020         Review of Systems   All other systems reviewed and negative except pertinent positives noted in HPI.    Objective:      Physical Exam  Constitutional:       General: He is not in acute distress.     Appearance: He is well-developed.   HENT:      Head: Normocephalic and atraumatic.   Eyes:      General: No scleral icterus.  Neck:      Trachea: No tracheal deviation.   Pulmonary:      Effort: Pulmonary effort is normal. No respiratory distress.   Genitourinary:     Comments: KAREN negative for nodules. ~50grams.    Neurological:      Mental Status: He is alert and oriented to person, place, and time.   Psychiatric:         Behavior: Behavior normal.         Thought Content: Thought content normal.         Judgment: Judgment normal.         Assessment:       1. Benign prostatic hyperplasia with urinary obstruction    2. Prostate cancer        Plan:     1. Benign prostatic hyperplasia with urinary obstruction    2. Prostate cancer        Orders Placed This Encounter   Procedures    MRI Prostate W W/O Contrast     Standing Status:   Future     Standing Expiration Date:   3/8/2023     Order Specific Question:   Does the patient have a pacemaker or a defibrilator (Note: Some facilities may not be able to schedule an MRI for patients with pacemakers and defibrillators. You should contact your local radiology department to determine if this is the case.)?     Answer:   No     Order Specific Question:   Does the patient have an aneurysm or surgical clip, pump, nerve/brain stimulator, middle/inner ear prosthesis, or other metal implant or foreign object (bullet, shrapnel)? If they have a card related to their implant, ask them to bring it. Issues related to the implant may cause the MRI to be delayed.     Answer:   No     Order Specific Question:   Is the patient claustrophobic?     Answer:   No     Order Specific Question:   Will the patient require sedation?     Answer:   No     Order Specific Question:   Does the patient have any of the following conditions? Diabetes, History of Renal Disease or Hypertension requiring medical therapy?     Answer:   Yes     Order Specific Question:   May the Radiologist modify the order per protocol to meet the clinical needs of the patient?     Answer:   Yes     Order Specific Question:   Is this part of a Research Study?     Answer:   No     Order Specific Question:   Does the patient have on a skin patch for medication with aluminized backing?     Answer:   No    Bladder scan       1. Prostate  cancer:  -again discussed pathology  -discussed treatment options, primarily XRT, surgery, or active surveillance. Discussed that his psa has gone up slightly, but not in concerning/trending manner.   - PSA today is stable.   -given very low risk prostate CA, recommended continued active surveillance.   Patient would like to have a prostate MRI---ordered. Will notify of results.  If abnormal, will re-biopsy, but if normal, will continue psa surveillance.       2. BPH:  -patient interested in medical therapy.  -will prescribe silodosin 4mg daily.

## 2022-03-09 LAB — ACE SERPL-CCNC: 32 U/L (ref 16–85)

## 2022-03-09 NOTE — PROCEDURES
Jaguar Lackey is a 62 y.o.  male patient, who presents for a 6 minute walk test ordered by MD Giovanni.  The diagnosis is Sarcoidosis.  The patient's BMI is 27.7 kg/m2.  Predicted distance (lower limit of normal) is 401.32 meters.      Test Results:    The test was completed without stopping.   The total time walked was 360 seconds.  During walking, the patient reported:  No complaints. The patient used no assistive devices  during testing.     03/08/2022---------Distance: 335.28 meters (1100 feet)     O2 Sat % Supplemental Oxygen Heart Rate Blood Pressure Nanci Scale   Pre-exercise  (Resting) 98 % Room Air 55 bpm 136/77 mmHg 0   During Exercise 95 % Room Air 71 bpm 145/77 mmHg 0.5   Post-exercise  (Recovery) 98 % Room Air  63 bpm   mmHg       Recovery Time: 105 seconds    Performing nurse/tech: Toño MOCK      PREVIOUS STUDY:   The patient had a previous study.     09/11/2020---------Distance: 389.23 meters (1277 feet)       O2 Sat % Supplemental Oxygen Heart Rate Blood Pressure Nanci Scale   Pre-exercise  (Resting) 97 % Room Air 56 bpm 132/78 mmHg 0   During Exercise 98 % Room Air 76 bpm 135/73 mmHg 2   Post-exercise  (Recovery) 98 % Room Air  57 bpm              CLINICAL INTERPRETATION:  Six minute walk distance is 335.28 meters (1100 feet) with very, very light dyspnea.  During exercise, there was significant desaturation while breathing room air.  Both blood pressure and heart rate remained stable with walking.  The patient did not report non-pulmonary symptoms during exercise.  Since the previous study in September 2020, exercise capacity is unchanged.  Based upon age and body mass index, exercise capacity is less than predicted.

## 2022-04-04 ENCOUNTER — TELEPHONE (OUTPATIENT)
Dept: OPHTHALMOLOGY | Facility: CLINIC | Age: 62
End: 2022-04-04

## 2022-04-04 ENCOUNTER — PATIENT OUTREACH (OUTPATIENT)
Dept: ADMINISTRATIVE | Facility: OTHER | Age: 62
End: 2022-04-04
Payer: COMMERCIAL

## 2022-04-04 ENCOUNTER — OFFICE VISIT (OUTPATIENT)
Dept: OPHTHALMOLOGY | Facility: CLINIC | Age: 62
End: 2022-04-04
Payer: COMMERCIAL

## 2022-04-04 DIAGNOSIS — Z96.1 PSEUDOPHAKIA OF BOTH EYES: ICD-10-CM

## 2022-04-04 DIAGNOSIS — H20.9 UVEITIC GLAUCOMA, BILATERAL, SEVERE STAGE: Primary | ICD-10-CM

## 2022-04-04 DIAGNOSIS — H40.43X3 UVEITIC GLAUCOMA, BILATERAL, SEVERE STAGE: Primary | ICD-10-CM

## 2022-04-04 DIAGNOSIS — H57.09 RELATIVE AFFERENT PUPILLARY DEFECT: ICD-10-CM

## 2022-04-04 DIAGNOSIS — D86.83 IRIDOCYCLITIS DUE TO SARCOIDOSIS: ICD-10-CM

## 2022-04-04 DIAGNOSIS — H21.523 PAS (PERIPH ANTERIOR SYNECHIAE), BILATERAL: ICD-10-CM

## 2022-04-04 DIAGNOSIS — H40.043 STEROID RESPONDERS BORDERLINE GLAUCOMA, BILATERAL: ICD-10-CM

## 2022-04-04 DIAGNOSIS — H18.422 BAND KERATOPATHY, LEFT: ICD-10-CM

## 2022-04-04 DIAGNOSIS — H54.8 LEGAL BLINDNESS: ICD-10-CM

## 2022-04-04 DIAGNOSIS — H47.292 POSTINFLAMMATORY OPTIC ATROPHY, LEFT: ICD-10-CM

## 2022-04-04 PROCEDURE — 99999 PR PBB SHADOW E&M-EST. PATIENT-LVL III: CPT | Mod: PBBFAC,,, | Performed by: OPHTHALMOLOGY

## 2022-04-04 PROCEDURE — 1159F PR MEDICATION LIST DOCUMENTED IN MEDICAL RECORD: ICD-10-PCS | Mod: CPTII,S$GLB,, | Performed by: OPHTHALMOLOGY

## 2022-04-04 PROCEDURE — 99999 PR PBB SHADOW E&M-EST. PATIENT-LVL III: ICD-10-PCS | Mod: PBBFAC,,, | Performed by: OPHTHALMOLOGY

## 2022-04-04 PROCEDURE — 92012 INTRM OPH EXAM EST PATIENT: CPT | Mod: S$GLB,,, | Performed by: OPHTHALMOLOGY

## 2022-04-04 PROCEDURE — 92012 PR EYE EXAM, EST PATIENT,INTERMED: ICD-10-PCS | Mod: S$GLB,,, | Performed by: OPHTHALMOLOGY

## 2022-04-04 PROCEDURE — 1159F MED LIST DOCD IN RCRD: CPT | Mod: CPTII,S$GLB,, | Performed by: OPHTHALMOLOGY

## 2022-04-04 NOTE — TELEPHONE ENCOUNTER
----- Message from Wandy Fay sent at 4/4/2022  2:42 PM CDT -----  Contact: Jaguar @ [phone]  Pt needs a call back regarding his visit

## 2022-04-04 NOTE — PROGRESS NOTES
HPI     DLS: 12/16/2021    Pt here for 2 Month Check;  Pt states no eye pain or discomfort.     Meds;  Combigan TID OU   Dorzolamide TID OU   Latanoprost QHS OS     1) Severe Uveitic Glaucoma OD>OS   2) Secondary Iritis OU   3) Iridocyclitis due to Sarcoidosis OU   4) Angle Closure - PAS OU   5) APD OS   6) Steroid Responder   7) Optic Atrophy OS   8) PCIOL OU   9) Lega lly Blind   S/p edta chelation OS 9/24/21   10) Band Keratopathy OS     Last edited by Candace Fan on 4/4/2022  1:36 PM. (History)            Assessment /Plan     For exam results, see Encounter Report.    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 Uveitic glaucoma - Both Eyes OD>OS  2/2 sarcoidosis  Old pt of Dr. Wagner and Dr. Menard  First HVF 2012  First photos 2011     Family history   neg  Glaucoma meds   Off all drops and diamox 2013 to 2014  (pt lost insurance)(( had +++ progression off gtts)                              back on gtts combigan/azopt OU and latanoprost OS  H/O adverse rxn to glaucoma drops  None  LASERS   yag cap od 4/4/2019 - did not help   GLAUCOMA SURGERIES   Trab OD (3/20/ 2010 -[facility]), Bleb Revision OD  3/21/2012 Loft// complex phaco/Baerveldt od 9/24/2014  OTHER EYE SURGERIES   PPV/Endo O5, CE OS (04)  CDR  0.95/0.5  Tbase  ?    Tmax  36/22 on meds      Ttarget   16/20       HVF  10-2 stim III OD (3/5/12)-sup arc,inf alt, tiny residual island OD; 24-2 OS w/ marked general depression             3/4/2013 ---HVF 10-2 stim III OD - SAD / Inf Alt los s            OD - 7 test 10-2  "stim V - 2014 to 2020  - Ext - + prog (NO MORE TESTING OD)             OS - 7 test [id number]   24-2 - stim V OS  // Inf Alt loss/SAD  - stable     Gonio -  PAS OD - 360 // OS -  f    CCT  525/531  OCT  7 test 2011 to 2021   - dec. S/I/N od // Dec S/N/I   HRT  8 test 2012 to 2019  - MR -  Dec. S/T, bord I/N od // xx os /// CDR 9.78 od // xx os  Disc photos  2011, 2013, 2014, 2015, 2018, 2020   - OIS     Ttoday 12/12   ((down from 23/16 - now off steroid gtts od ))  Test done today IOP // Iritis check     2. Secondary iritis - Both Eyes      3. 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 Postinflammatory optic atrophy - Left Eye   -Sarcoid, + APD, uncertain etiology     5. Relative afferent pupillary defect OS     6. Steroid responders borderline glaucoma - Both Eyes -increased IOP w/ Pred Acetate. Does better w/ Vexol     7. Legal blindness - Both EyeS OD based on VF loss and OS based on [facility].     8. Band keratopathy os   - chelation with [facility] 9/24/2021     9 Pseudophakic OD  S/P complex phaco/IOL / baerveldt od 9/24/2014   PC IOL os - done years ago - ? [facility] - ? IOL sewn in        Plan:  Uveitic glaucoma  Angle closure - PAS ou   Lost to F/U for 10 months from 9/2013 to 7/2014  - [facility] od went  from 20/40 to LP  Was already CF at 3 ft OS- 2/2 ON pallor from sarcoid  IOP OD was 64 - off drops since 9/2013 - when re-presented     Pt is "legally blind" - he may want to apply for disability.    Pt previously had issues with insurance, but now has new insurance.  Discussed with patient that the vision loss od is irreversible 2/2 glaucoma and the importance of compliance with gtt/meds.      Developed  anterior chamber fibrin OD  Post GDD  - it has has resolved - but prone to iritis with any intervention     Pt has seen the pulmonologist  Has seen an internist   Has  seen rheumatology     Was  on MTX for sarcoid and iritis - off - stopped on his own - seems to be doing ok (since 5/30/2018)  No increase in " inflammationn od on 7/30/2018 // still ok off steroid gtts 11/12/2018    - Cont combigan tid OU   - cont  dorzolamide OU -  Tid   - cont latanoprost - - OS only     CSM - glaucoma gtts   Stay off steroid gtts for now      yag cap od - 4/4/2019 - did NOT improve the vision     Dry eyes   Gel gtts q hs os and ointment q hs os     Photo file updated 12/16/2021 4/4/22. Loose suture OD- removed- moxi placed. Use TID for 3 days OD.     F/U 4 months with  IOP check and  Iritis check    I  Pt is getting training (first step) at [facility] - says it is going well.- uses a mobility cane

## 2022-04-05 ENCOUNTER — IMMUNIZATION (OUTPATIENT)
Dept: PHARMACY | Facility: CLINIC | Age: 62
End: 2022-04-05
Payer: COMMERCIAL

## 2022-04-12 ENCOUNTER — PATIENT OUTREACH (OUTPATIENT)
Dept: ADMINISTRATIVE | Facility: HOSPITAL | Age: 62
End: 2022-04-12
Payer: COMMERCIAL

## 2022-04-12 ENCOUNTER — PATIENT MESSAGE (OUTPATIENT)
Dept: ADMINISTRATIVE | Facility: HOSPITAL | Age: 62
End: 2022-04-12
Payer: COMMERCIAL

## 2022-04-12 NOTE — PROGRESS NOTES
There are no preventive care reminders to display for this patient.    Triggered LINKS. Updated Care Everywhere. Portal message has been sent asking pt to schedule colonoscopy. Chart review completed as part of PHN attestation.

## 2022-05-31 ENCOUNTER — PES CALL (OUTPATIENT)
Dept: ADMINISTRATIVE | Facility: CLINIC | Age: 62
End: 2022-05-31
Payer: COMMERCIAL

## 2022-06-08 ENCOUNTER — TELEPHONE (OUTPATIENT)
Dept: ADMINISTRATIVE | Facility: CLINIC | Age: 62
End: 2022-06-08
Payer: COMMERCIAL

## 2022-06-09 ENCOUNTER — OFFICE VISIT (OUTPATIENT)
Dept: INTERNAL MEDICINE | Facility: CLINIC | Age: 62
End: 2022-06-09
Payer: COMMERCIAL

## 2022-06-09 VITALS
HEART RATE: 58 BPM | SYSTOLIC BLOOD PRESSURE: 130 MMHG | WEIGHT: 171.19 LBS | HEIGHT: 69 IN | BODY MASS INDEX: 25.36 KG/M2 | OXYGEN SATURATION: 98 % | DIASTOLIC BLOOD PRESSURE: 70 MMHG

## 2022-06-09 DIAGNOSIS — E55.9 VITAMIN D DEFICIENCY DISEASE: ICD-10-CM

## 2022-06-09 DIAGNOSIS — N40.1 BENIGN PROSTATIC HYPERPLASIA WITH URINARY OBSTRUCTION: ICD-10-CM

## 2022-06-09 DIAGNOSIS — H54.8 LEGAL BLINDNESS: ICD-10-CM

## 2022-06-09 DIAGNOSIS — H20.9 UVEITIC GLAUCOMA, UNSPECIFIED GLAUCOMA STAGE, UNSPECIFIED LATERALITY: ICD-10-CM

## 2022-06-09 DIAGNOSIS — I10 HYPERTENSION, UNSPECIFIED TYPE: ICD-10-CM

## 2022-06-09 DIAGNOSIS — C61 PROSTATE CANCER: ICD-10-CM

## 2022-06-09 DIAGNOSIS — D86.0 SARCOIDOSIS OF LUNG: ICD-10-CM

## 2022-06-09 DIAGNOSIS — N13.8 BENIGN PROSTATIC HYPERPLASIA WITH URINARY OBSTRUCTION: ICD-10-CM

## 2022-06-09 DIAGNOSIS — E78.5 HYPERLIPIDEMIA, UNSPECIFIED HYPERLIPIDEMIA TYPE: ICD-10-CM

## 2022-06-09 DIAGNOSIS — H40.40X0 UVEITIC GLAUCOMA, UNSPECIFIED GLAUCOMA STAGE, UNSPECIFIED LATERALITY: ICD-10-CM

## 2022-06-09 DIAGNOSIS — Z00.00 ENCOUNTER FOR PREVENTIVE HEALTH EXAMINATION: Primary | ICD-10-CM

## 2022-06-09 DIAGNOSIS — H40.049: ICD-10-CM

## 2022-06-09 PROCEDURE — 3078F DIAST BP <80 MM HG: CPT | Mod: CPTII,S$GLB,, | Performed by: PHYSICIAN ASSISTANT

## 2022-06-09 PROCEDURE — 3078F PR MOST RECENT DIASTOLIC BLOOD PRESSURE < 80 MM HG: ICD-10-PCS | Mod: CPTII,S$GLB,, | Performed by: PHYSICIAN ASSISTANT

## 2022-06-09 PROCEDURE — G0439 PPPS, SUBSEQ VISIT: HCPCS | Mod: S$GLB,,, | Performed by: PHYSICIAN ASSISTANT

## 2022-06-09 PROCEDURE — 99499 RISK ADDL DX/OHS AUDIT: ICD-10-PCS | Mod: S$GLB,,, | Performed by: PHYSICIAN ASSISTANT

## 2022-06-09 PROCEDURE — 3008F PR BODY MASS INDEX (BMI) DOCUMENTED: ICD-10-PCS | Mod: CPTII,S$GLB,, | Performed by: PHYSICIAN ASSISTANT

## 2022-06-09 PROCEDURE — 99499 UNLISTED E&M SERVICE: CPT | Mod: S$GLB,,, | Performed by: PHYSICIAN ASSISTANT

## 2022-06-09 PROCEDURE — 99999 PR PBB SHADOW E&M-EST. PATIENT-LVL IV: CPT | Mod: PBBFAC,,, | Performed by: PHYSICIAN ASSISTANT

## 2022-06-09 PROCEDURE — 3075F SYST BP GE 130 - 139MM HG: CPT | Mod: CPTII,S$GLB,, | Performed by: PHYSICIAN ASSISTANT

## 2022-06-09 PROCEDURE — 99999 PR PBB SHADOW E&M-EST. PATIENT-LVL IV: ICD-10-PCS | Mod: PBBFAC,,, | Performed by: PHYSICIAN ASSISTANT

## 2022-06-09 PROCEDURE — 3008F BODY MASS INDEX DOCD: CPT | Mod: CPTII,S$GLB,, | Performed by: PHYSICIAN ASSISTANT

## 2022-06-09 PROCEDURE — 1159F PR MEDICATION LIST DOCUMENTED IN MEDICAL RECORD: ICD-10-PCS | Mod: CPTII,S$GLB,, | Performed by: PHYSICIAN ASSISTANT

## 2022-06-09 PROCEDURE — 1160F RVW MEDS BY RX/DR IN RCRD: CPT | Mod: CPTII,S$GLB,, | Performed by: PHYSICIAN ASSISTANT

## 2022-06-09 PROCEDURE — 3075F PR MOST RECENT SYSTOLIC BLOOD PRESS GE 130-139MM HG: ICD-10-PCS | Mod: CPTII,S$GLB,, | Performed by: PHYSICIAN ASSISTANT

## 2022-06-09 PROCEDURE — G0439 PR MEDICARE ANNUAL WELLNESS SUBSEQUENT VISIT: ICD-10-PCS | Mod: S$GLB,,, | Performed by: PHYSICIAN ASSISTANT

## 2022-06-09 PROCEDURE — 1159F MED LIST DOCD IN RCRD: CPT | Mod: CPTII,S$GLB,, | Performed by: PHYSICIAN ASSISTANT

## 2022-06-09 PROCEDURE — 1160F PR REVIEW ALL MEDS BY PRESCRIBER/CLIN PHARMACIST DOCUMENTED: ICD-10-PCS | Mod: CPTII,S$GLB,, | Performed by: PHYSICIAN ASSISTANT

## 2022-06-09 NOTE — PROGRESS NOTES
"  Jaguar Lackey presented for a  Medicare AWV and comprehensive Health Risk Assessment today. The following components were reviewed and updated:    · Medical history  · Family History  · Social history  · Allergies and Current Medications  · Health Risk Assessment  · Health Maintenance  · Care Team         ** See Completed Assessments for Annual Wellness Visit within the encounter summary.**         The following assessments were completed:  · Living Situation  · CAGE  · Depression Screening  · Timed Get Up and Go  · Whisper Test  · Cognitive Function Screening  · Nutrition Screening  · ADL Screening  · PAQ Screening    3/3 immediate and delayed word recall  Unable to draw clock due to blindness but able to spell WORLD backwards    Vitals:    06/09/22 0740   BP: 130/70   Pulse: (!) 58   SpO2: 98%   Weight: 77.7 kg (171 lb 3 oz)   Height: 5' 9" (1.753 m)     Body mass index is 25.28 kg/m².  Physical Exam  Vitals and nursing note reviewed.   Constitutional:       General: He is not in acute distress.     Appearance: He is well-developed. He is not diaphoretic.      Comments: Legally blind   HENT:      Head: Normocephalic and atraumatic.      Right Ear: External ear normal.      Left Ear: External ear normal.   Eyes:      Pupils: Pupils are equal, round, and reactive to light.   Cardiovascular:      Rate and Rhythm: Normal rate and regular rhythm.      Heart sounds: Normal heart sounds. No murmur heard.    No friction rub. No gallop.   Pulmonary:      Effort: Pulmonary effort is normal. No respiratory distress.      Breath sounds: Normal breath sounds.   Abdominal:      Palpations: Abdomen is soft.      Tenderness: There is no abdominal tenderness.   Musculoskeletal:      Cervical back: Neck supple.   Skin:     General: Skin is warm and dry.   Neurological:      Mental Status: He is alert.             Works at [employer] x 4 years  Takes bus to get places     Diagnoses and health risks identified today and " associated recommendations/orders:    1. Encounter for preventive health examination  Assessments completed.  Preventative health recommendations reviewed.      2. Uveitic glaucoma, unspecified glaucoma stage, unspecified laterality  Stable, controlled on current medical therapy, followed by PCP and ophthamology.      3. Borderline steroid-induced glaucoma, unspecified laterality  Stable, controlled on current medical therapy, followed by PCP and ophthalmology.      4. Legal blindness - Both Eyes  Stable, controlled on current medical therapy, followed by PCP and ophthamology.      5. Sarcoidosis of lung  Stable, controlled on current medical therapy, followed by PCP, pulmonary, and rheum.      6. Hyperlipidemia, unspecified hyperlipidemia type  Stable, controlled on current medical therapy, followed by PCP.      7. Hypertension, unspecified type  Stable, controlled on current medical therapy, followed by PCP.      8. Benign prostatic hyperplasia with urinary obstruction  Stable, controlled on current medical therapy, followed by PCP and urology.      9. Prostate cancer  Stable, controlled on current medical therapy, followed by PCP and urology.      10. Vitamin D deficiency disease  Stable, controlled on current medical therapy, followed by PCP.        Provided Jaguar with a 5-10 year written screening schedule and personal prevention plan. Recommendations were developed using the USPSTF age appropriate recommendations. Education, counseling, and referrals were provided as needed. After Visit Summary printed and given to patient which includes a list of additional screenings\tests needed.    No follow-ups on file.    Taniya Woodward PA-C  I offered to discuss advanced care planning, including how to pick a person who would make decisions for you if you were unable to make them for yourself, called a health care power of , and what kind of decisions you might make such as use of life sustaining treatments  such as ventilators and tube feeding when faced with a life limiting illness recorded on a living will that they will need to know. (How you want to be cared for as you near the end of your natural life)     X Patient is interested in learning more about how to make advanced directives.  I provided them paperwork and offered to discuss this with them.

## 2022-06-09 NOTE — PATIENT INSTRUCTIONS
Counseling and Referral of Other Preventative  (Italic type indicates deductible and co-insurance are waived)    Patient Name: Jaguar Lackey  Today's Date: 6/9/2022    Health Maintenance       Date Due Completion Date    COVID-19 Vaccine (4 - Booster for Moderna series) 03/05/2022 11/5/2021    Lipid Panel 11/05/2022 11/5/2021    Colorectal Cancer Screening 12/05/2022 12/5/2012    PROSTATE-SPECIFIC ANTIGEN 03/08/2023 3/8/2022    Pneumococcal Vaccines (Age 0-64) (3 - PPSV23 or PCV20) 05/08/2024 5/8/2019    TETANUS VACCINE 11/28/2026 11/28/2016        No orders of the defined types were placed in this encounter.    The following information is provided to all patients.  This information is to help you find resources for any of the problems found today that may be affecting your health:                Living healthy guide: www.[facility].[place].[facility]      Understanding Diabetes: www.diabetes.org      Eating healthy: www.cdc.gov/healthyweight      CDC home safety checklist: www.cdc.gov/steadi/patient.html      Agency on Aging: www.goea.[place].[facility]      Alcoholics anonymous (AA): www.aa.org      Physical Activity: www.rios.nih.gov/li0ctqs      Tobacco use: www.quitwithusla.org

## 2022-08-07 NOTE — PROGRESS NOTES
HPI     DLS: 4/04/2022    Pt here for 3 Month Check:  Pt states every now and then his eyes would tear but no eye pain.     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 (History)              Assessment /Plan     For exam results, see Encounter Report.    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 Uveitic glaucoma - Both Eyes OD>OS  2/2 sarcoidosis  Old pt of Dr. Wagner and Dr. Menard  First HVF 2012  First photos 2011     Family history   neg  Glaucoma meds   Off all drops and diamox 2013 to 2014  (pt lost insurance)(( had +++ progression off gtts)                              back on gtts combigan/azopt OU and latanoprost OS  H/O adverse rxn to glaucoma drops  None  LASERS   yag cap od 4/4/2019 - did not help   GLAUCOMA SURGERIES   Trab OD (3/20/ 2010 -Derian), Bleb Revision OD  3/21/2012 Loft// complex phaco/Baerveldt od 9/24/2014  OTHER EYE SURGERIES   PPV/Endo O5, CE OS (04)  CDR  0.95/0.5  Tbase  ?    Tmax  36/22 on meds      Ttarget   16/20       HVF  10-2 stim III OD (3/5/12)-sup arc,inf alt, tiny residual island OD; 24-2 OS w/ marked general depression              "3/4/2013 ---HVF 10-2 stim III OD - SAD / Inf Alt los s            OD - 7 test 10-2 stim V - 2014 to 2020  - Ext - + prog (NO MORE TESTING OD)             OS - 7 test 9534-9645   24-2 - stim V OS  // Inf Alt loss/SAD  - stable            PT DECLINES FURTHER VF TESTING     Gonio -  PAS OD - 360 // OS -  f    CCT  525/531  OCT  7 test 2011 to 2021   - dec. S/I/N od // Dec S/N/I   HRT  8 test 2012 to 2019  - MR -  Dec. S/T, bord I/N od // xx os /// CDR 9.78 od // xx os  Disc photos  2011, 2013, 2014, 2015, 2018, 2020   - OIS     Ofbzng46/13  ((down from 23/16 - now off steroid gtts od ))  Test done today IOP // Iritis check     2. Secondary iritis - Both Eyes      3. 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 Postinflammatory optic atrophy - Left Eye   -Sarcoid, + APD, uncertain etiology     5. Relative afferent pupillary defect OS     6. Steroid responders borderline glaucoma - Both Eyes -increased IOP w/ Pred Acetate. Does better w/ Vexol     7. Legal blindness - Both EyeS OD based on VF loss and OS based on VA.     8. Band keratopathy os   - chelation with [facility] 9/24/2021     9 Pseudophakic OD  S/P complex phaco/IOL / baerveldt od 9/24/2014   PC IOL os - done years ago - ? [facility] - ? IOL sewn in        Plan:  Uveitic glaucoma  Angle closure - PAS ou   Lost to F/U for 10 months from 9/2013 to 7/2014  - VA od went  from 20/40 to LP  Was already CF at 3 ft OS- 2/2 ON pallor from sarcoid  IOP OD was 64 - off drops since 9/2013 - when re-presented     Pt is "legally blind" - he may want to apply for disability.    Pt previously had issues with insurance, but now has new insurance.  Discussed with patient that the vision loss od is irreversible 2/2 glaucoma and the importance of compliance with gtt/meds.      Developed  anterior chamber fibrin OD  Post GDD  - it has has resolved - but prone to iritis with any intervention     Pt has seen the pulmonologist  Has seen an internist   Has  seen rheumatology "     Was  on MTX for sarcoid and iritis - off - stopped on his own - seems to be doing ok (since 5/30/2018)  No increase in inflammationn od on 7/30/2018 // still ok off steroid gtts 11/12/2018    - Cont combigan tid OU   - cont  dorzolamide OU -  Tid   - cont latanoprost - - OS only     CSM - glaucoma gtts   Stay off steroid gtts for now      yag cap od - 4/4/2019 - did NOT improve the vision     Dry eyes   Gel gtts q hs os and ointment q hs os     Photo file updated 12/16/2021    F/U 4 months with  IOP check and  Iritis check// declines further VF testing   I  Pt is getting [facility] ([facility] - says it is going well.- uses a mobility cane

## 2022-08-11 ENCOUNTER — OFFICE VISIT (OUTPATIENT)
Dept: OPHTHALMOLOGY | Facility: CLINIC | Age: 62
End: 2022-08-11
Payer: COMMERCIAL

## 2022-08-11 DIAGNOSIS — H21.523 PAS (PERIPH ANTERIOR SYNECHIAE), BILATERAL: ICD-10-CM

## 2022-08-11 DIAGNOSIS — H40.043 STEROID RESPONDERS BORDERLINE GLAUCOMA, BILATERAL: ICD-10-CM

## 2022-08-11 DIAGNOSIS — H54.8 LEGAL BLINDNESS: ICD-10-CM

## 2022-08-11 DIAGNOSIS — H20.9 UVEITIC GLAUCOMA, BILATERAL, SEVERE STAGE: Primary | ICD-10-CM

## 2022-08-11 DIAGNOSIS — H26.491 PCO (POSTERIOR CAPSULAR OPACIFICATION), RIGHT: ICD-10-CM

## 2022-08-11 DIAGNOSIS — H57.09 RELATIVE AFFERENT PUPILLARY DEFECT: ICD-10-CM

## 2022-08-11 DIAGNOSIS — H40.43X3 UVEITIC GLAUCOMA, BILATERAL, SEVERE STAGE: Primary | ICD-10-CM

## 2022-08-11 DIAGNOSIS — D86.83 IRIDOCYCLITIS DUE TO SARCOIDOSIS: ICD-10-CM

## 2022-08-11 DIAGNOSIS — Z96.1 PSEUDOPHAKIA OF BOTH EYES: ICD-10-CM

## 2022-08-11 DIAGNOSIS — Z98.890 S/P YAG CAPSULOTOMY, RIGHT: ICD-10-CM

## 2022-08-11 DIAGNOSIS — H47.292 POSTINFLAMMATORY OPTIC ATROPHY, LEFT: ICD-10-CM

## 2022-08-11 DIAGNOSIS — H18.422 BAND KERATOPATHY, LEFT: ICD-10-CM

## 2022-08-11 PROCEDURE — 1160F PR REVIEW ALL MEDS BY PRESCRIBER/CLIN PHARMACIST DOCUMENTED: ICD-10-PCS | Mod: CPTII,S$GLB,, | Performed by: OPHTHALMOLOGY

## 2022-08-11 PROCEDURE — 1160F RVW MEDS BY RX/DR IN RCRD: CPT | Mod: CPTII,S$GLB,, | Performed by: OPHTHALMOLOGY

## 2022-08-11 PROCEDURE — 1159F MED LIST DOCD IN RCRD: CPT | Mod: CPTII,S$GLB,, | Performed by: OPHTHALMOLOGY

## 2022-08-11 PROCEDURE — 99999 PR PBB SHADOW E&M-EST. PATIENT-LVL II: CPT | Mod: PBBFAC,,, | Performed by: OPHTHALMOLOGY

## 2022-08-11 PROCEDURE — 92012 INTRM OPH EXAM EST PATIENT: CPT | Mod: S$GLB,,, | Performed by: OPHTHALMOLOGY

## 2022-08-11 PROCEDURE — 92012 PR EYE EXAM, EST PATIENT,INTERMED: ICD-10-PCS | Mod: S$GLB,,, | Performed by: OPHTHALMOLOGY

## 2022-08-11 PROCEDURE — 99999 PR PBB SHADOW E&M-EST. PATIENT-LVL II: ICD-10-PCS | Mod: PBBFAC,,, | Performed by: OPHTHALMOLOGY

## 2022-08-11 PROCEDURE — 1159F PR MEDICATION LIST DOCUMENTED IN MEDICAL RECORD: ICD-10-PCS | Mod: CPTII,S$GLB,, | Performed by: OPHTHALMOLOGY

## 2022-09-16 ENCOUNTER — LAB VISIT (OUTPATIENT)
Dept: LAB | Facility: HOSPITAL | Age: 62
End: 2022-09-16
Attending: INTERNAL MEDICINE
Payer: COMMERCIAL

## 2022-09-16 ENCOUNTER — OFFICE VISIT (OUTPATIENT)
Dept: INTERNAL MEDICINE | Facility: CLINIC | Age: 62
End: 2022-09-16
Payer: COMMERCIAL

## 2022-09-16 ENCOUNTER — IMMUNIZATION (OUTPATIENT)
Dept: INTERNAL MEDICINE | Facility: CLINIC | Age: 62
End: 2022-09-16
Payer: COMMERCIAL

## 2022-09-16 VITALS
SYSTOLIC BLOOD PRESSURE: 136 MMHG | DIASTOLIC BLOOD PRESSURE: 82 MMHG | HEIGHT: 69 IN | OXYGEN SATURATION: 96 % | WEIGHT: 184.5 LBS | BODY MASS INDEX: 27.33 KG/M2 | HEART RATE: 96 BPM

## 2022-09-16 DIAGNOSIS — E78.5 HYPERLIPIDEMIA, UNSPECIFIED HYPERLIPIDEMIA TYPE: Primary | ICD-10-CM

## 2022-09-16 DIAGNOSIS — D86.0 SARCOIDOSIS OF LUNG: ICD-10-CM

## 2022-09-16 DIAGNOSIS — I10 HYPERTENSION, UNSPECIFIED TYPE: ICD-10-CM

## 2022-09-16 DIAGNOSIS — E78.5 HYPERLIPIDEMIA, UNSPECIFIED HYPERLIPIDEMIA TYPE: ICD-10-CM

## 2022-09-16 DIAGNOSIS — C61 PROSTATE CANCER: ICD-10-CM

## 2022-09-16 DIAGNOSIS — D86.83 IRIDOCYCLITIS DUE TO SARCOIDOSIS: ICD-10-CM

## 2022-09-16 DIAGNOSIS — Z23 NEEDS FLU SHOT: Primary | ICD-10-CM

## 2022-09-16 LAB
ALBUMIN SERPL BCP-MCNC: 4 G/DL (ref 3.5–5.2)
ALP SERPL-CCNC: 47 U/L (ref 55–135)
ALT SERPL W/O P-5'-P-CCNC: 24 U/L (ref 10–44)
ANION GAP SERPL CALC-SCNC: 9 MMOL/L (ref 8–16)
AST SERPL-CCNC: 19 U/L (ref 10–40)
BILIRUB SERPL-MCNC: 0.7 MG/DL (ref 0.1–1)
BUN SERPL-MCNC: 18 MG/DL (ref 8–23)
CALCIUM SERPL-MCNC: 9.9 MG/DL (ref 8.7–10.5)
CHLORIDE SERPL-SCNC: 107 MMOL/L (ref 95–110)
CHOLEST SERPL-MCNC: 171 MG/DL (ref 120–199)
CHOLEST/HDLC SERPL: 3.7 {RATIO} (ref 2–5)
CO2 SERPL-SCNC: 27 MMOL/L (ref 23–29)
COMPLEXED PSA SERPL-MCNC: 4.5 NG/ML (ref 0–4)
CREAT SERPL-MCNC: 1 MG/DL (ref 0.5–1.4)
EST. GFR  (NO RACE VARIABLE): >60 ML/MIN/1.73 M^2
GLUCOSE SERPL-MCNC: 95 MG/DL (ref 70–110)
HDLC SERPL-MCNC: 46 MG/DL (ref 40–75)
HDLC SERPL: 26.9 % (ref 20–50)
LDLC SERPL CALC-MCNC: 109.8 MG/DL (ref 63–159)
NONHDLC SERPL-MCNC: 125 MG/DL
POTASSIUM SERPL-SCNC: 4 MMOL/L (ref 3.5–5.1)
PROT SERPL-MCNC: 7.3 G/DL (ref 6–8.4)
SODIUM SERPL-SCNC: 143 MMOL/L (ref 136–145)
TRIGL SERPL-MCNC: 76 MG/DL (ref 30–150)

## 2022-09-16 PROCEDURE — 3008F BODY MASS INDEX DOCD: CPT | Mod: CPTII,S$GLB,, | Performed by: INTERNAL MEDICINE

## 2022-09-16 PROCEDURE — 80061 LIPID PANEL: CPT | Performed by: INTERNAL MEDICINE

## 2022-09-16 PROCEDURE — 90471 IMMUNIZATION ADMIN: CPT | Mod: S$GLB,,, | Performed by: INTERNAL MEDICINE

## 2022-09-16 PROCEDURE — 99214 OFFICE O/P EST MOD 30 MIN: CPT | Mod: 25,S$GLB,, | Performed by: INTERNAL MEDICINE

## 2022-09-16 PROCEDURE — 3079F DIAST BP 80-89 MM HG: CPT | Mod: CPTII,S$GLB,, | Performed by: INTERNAL MEDICINE

## 2022-09-16 PROCEDURE — 90471 FLU VACCINE (QUAD) GREATER THAN OR EQUAL TO 3YO PRESERVATIVE FREE IM: ICD-10-PCS | Mod: S$GLB,,, | Performed by: INTERNAL MEDICINE

## 2022-09-16 PROCEDURE — 90686 IIV4 VACC NO PRSV 0.5 ML IM: CPT | Mod: S$GLB,,, | Performed by: INTERNAL MEDICINE

## 2022-09-16 PROCEDURE — 1159F MED LIST DOCD IN RCRD: CPT | Mod: CPTII,S$GLB,, | Performed by: INTERNAL MEDICINE

## 2022-09-16 PROCEDURE — 99214 PR OFFICE/OUTPT VISIT, EST, LEVL IV, 30-39 MIN: ICD-10-PCS | Mod: 25,S$GLB,, | Performed by: INTERNAL MEDICINE

## 2022-09-16 PROCEDURE — 3079F PR MOST RECENT DIASTOLIC BLOOD PRESSURE 80-89 MM HG: ICD-10-PCS | Mod: CPTII,S$GLB,, | Performed by: INTERNAL MEDICINE

## 2022-09-16 PROCEDURE — 36415 COLL VENOUS BLD VENIPUNCTURE: CPT | Performed by: INTERNAL MEDICINE

## 2022-09-16 PROCEDURE — 99999 PR PBB SHADOW E&M-EST. PATIENT-LVL III: CPT | Mod: PBBFAC,,, | Performed by: INTERNAL MEDICINE

## 2022-09-16 PROCEDURE — 90686 FLU VACCINE (QUAD) GREATER THAN OR EQUAL TO 3YO PRESERVATIVE FREE IM: ICD-10-PCS | Mod: S$GLB,,, | Performed by: INTERNAL MEDICINE

## 2022-09-16 PROCEDURE — 80053 COMPREHEN METABOLIC PANEL: CPT | Performed by: INTERNAL MEDICINE

## 2022-09-16 PROCEDURE — 99999 PR PBB SHADOW E&M-EST. PATIENT-LVL III: ICD-10-PCS | Mod: PBBFAC,,, | Performed by: INTERNAL MEDICINE

## 2022-09-16 PROCEDURE — 3075F PR MOST RECENT SYSTOLIC BLOOD PRESS GE 130-139MM HG: ICD-10-PCS | Mod: CPTII,S$GLB,, | Performed by: INTERNAL MEDICINE

## 2022-09-16 PROCEDURE — 3075F SYST BP GE 130 - 139MM HG: CPT | Mod: CPTII,S$GLB,, | Performed by: INTERNAL MEDICINE

## 2022-09-16 PROCEDURE — 84153 ASSAY OF PSA TOTAL: CPT | Performed by: INTERNAL MEDICINE

## 2022-09-16 PROCEDURE — 3008F PR BODY MASS INDEX (BMI) DOCUMENTED: ICD-10-PCS | Mod: CPTII,S$GLB,, | Performed by: INTERNAL MEDICINE

## 2022-09-16 PROCEDURE — 1159F PR MEDICATION LIST DOCUMENTED IN MEDICAL RECORD: ICD-10-PCS | Mod: CPTII,S$GLB,, | Performed by: INTERNAL MEDICINE

## 2022-09-16 RX ORDER — HYDROCHLOROTHIAZIDE 25 MG/1
25 TABLET ORAL DAILY
COMMUNITY
Start: 2022-09-09 | End: 2022-12-22

## 2022-09-16 NOTE — PROGRESS NOTES
Two pt identifier verified. Pt tolerated well. Advised pt to wait 15 minutes post immunization. Pt verbalized understanding.    Juan A THURMAN

## 2022-09-16 NOTE — PROGRESS NOTES
Mr. Lackey is a 62 -year-old gentleman coming   in today to follow up on his ongoing medical problems to include:       He was diagnosed with COVID back in March 2020 .      He is breathing well.          1. Hypertension. He is on Norvasc 10 mg a day. He has had hypertension   for several years.  bp 136/82.   He is not checking his bp at home.l  . no med change. He denies any   chest pain, short-windedness, palpitations, nausea or vomiting.        2. He has hyperlipidemia. He is on pravastatin 40 mg a day. Ilast lipid panel was 11/5/2021.      3. He has vitamin D deficiency with history of sarcoidosis. We put him on   vitamin D. He is on a 2 x monthly vitamin D. In Nov 2019  his Vit was 33-  he continues on his vitamin-D.- and he is taking one a week-         4. sarcoidosis. .He was 1st diagnosed with sarcoid in the 1990sMost prominent sarcoid issue is uveitis glaucoma.  He is seeing opthomology and see DR Davila in Rhematology-- last seen 2/2022.    He was on MTX for a while, bnut is off this now.  He denies any respiratory symptoms including wheezing or exercise intolerance. He went to  [facility] ([facility]) . I reviewedthose notes.            5. 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          6. Prostate cancer -- dx in 3/2020-- Saw urology in 3/2022.  low risk prostate CA,  continued active surveillance.  due for follow up         Last year he lost his mother, his niece and his Dog.             ROS : Gen - no fatigue or significant weight change  Eyes - no eye pain or visual changes  ENT - no hoarseness or sore throat  CV - No chest pain NO palpitations.   Pulm - no cough or wheezing-- otherwise as above.   GI - no N/V/D   no dysuria or incontinence  MS - no joint pain or muscle pain  Skin - no rash, or c/o of skin lesions  Neuro - no HA, dizziness--- memory  is doing well.   Heme - no abnormal bleeding or bruising  Endo - no polydipsia, or temperature changes  Psych - no anxiety or depression                     GENERAL: He is a well-appearing 62--year-old gentleman in no acute   distress.  Poor vision- Legally blind  Ear examination was unremarkable.   Eyes- sclera is injected but normal for him- no ptosis.   NECK: Supple. He has no JVD. Thyroid is not enlarged. Chest- CTA bilaterally.   CARDIOVASCULAR: S1, S2. Regular rate and rhythm.   LUNGS: Clear bilaterally- no wheeze.   ABDOMEN: Soft, nontender, no hepatosplenomegaly, no guarding, rebound or   tenderness. Slight distended.   LOWER EXTREMITIES: No edema.  NO hair on legs.       ASSESSMENT:      1. Hypertension, -continue the  Amlodipine  10 mg a day   2. Hyperlipidemia Continue the pravastatin   3. Sarcoidosis, Reviewed notes from [facility],--follow up  with [facility] and Optho,   4. Vitamin D deficiency.- --  continue every 1 weeks.    5.  COVID infection.  Resolved -- watch for COVID vaccine info.     6. Prostate Cancer-- He last saw Urology in 3/2022---- is doing active surveillance. Looked at last note and see that they wanted a MRI_- will follow up with Urology.      New covid vaccine booster and flu shot discussed.

## 2022-09-18 ENCOUNTER — PATIENT MESSAGE (OUTPATIENT)
Dept: INTERNAL MEDICINE | Facility: CLINIC | Age: 62
End: 2022-09-18
Payer: COMMERCIAL

## 2022-11-04 RX ORDER — PRAVASTATIN SODIUM 40 MG/1
40 TABLET ORAL DAILY
Qty: 90 TABLET | Refills: 3 | Status: SHIPPED | OUTPATIENT
Start: 2022-11-04 | End: 2023-11-01 | Stop reason: ALTCHOICE

## 2022-11-04 NOTE — TELEPHONE ENCOUNTER
Refill Decision Note   Jaguar Lackey  is requesting a refill authorization.  Brief Assessment and Rationale for Refill:  Approve     Medication Therapy Plan:       Medication Reconciliation Completed: No   Comments:     No Care Gaps recommended.     Note composed:10:39 AM 11/04/2022

## 2022-11-04 NOTE — TELEPHONE ENCOUNTER
No new care gaps identified.  [facility] Embedded Care Gaps. Reference number: [id number]. 11/04/2022   1:55:28 AM CDT

## 2022-11-25 ENCOUNTER — IMMUNIZATION (OUTPATIENT)
Dept: PHARMACY | Facility: CLINIC | Age: 62
End: 2022-11-25
Payer: COMMERCIAL

## 2022-11-25 DIAGNOSIS — Z23 NEED FOR VACCINATION: Primary | ICD-10-CM

## 2022-12-07 ENCOUNTER — PATIENT OUTREACH (OUTPATIENT)
Dept: ADMINISTRATIVE | Facility: HOSPITAL | Age: 62
End: 2022-12-07
Payer: COMMERCIAL

## 2023-02-09 DIAGNOSIS — Z00.00 ENCOUNTER FOR MEDICARE ANNUAL WELLNESS EXAM: ICD-10-CM

## 2023-03-07 ENCOUNTER — PATIENT MESSAGE (OUTPATIENT)
Dept: ADMINISTRATIVE | Facility: OTHER | Age: 63
End: 2023-03-07
Payer: MEDICARE

## 2023-03-08 ENCOUNTER — PES CALL (OUTPATIENT)
Dept: ADMINISTRATIVE | Facility: CLINIC | Age: 63
End: 2023-03-08
Payer: MEDICARE

## 2023-03-16 ENCOUNTER — LAB VISIT (OUTPATIENT)
Dept: LAB | Facility: HOSPITAL | Age: 63
End: 2023-03-16
Payer: COMMERCIAL

## 2023-03-16 ENCOUNTER — OFFICE VISIT (OUTPATIENT)
Dept: INTERNAL MEDICINE | Facility: CLINIC | Age: 63
End: 2023-03-16
Payer: COMMERCIAL

## 2023-03-16 VITALS
BODY MASS INDEX: 27.85 KG/M2 | WEIGHT: 188.06 LBS | HEART RATE: 52 BPM | HEIGHT: 69 IN | SYSTOLIC BLOOD PRESSURE: 122 MMHG | OXYGEN SATURATION: 97 % | DIASTOLIC BLOOD PRESSURE: 78 MMHG

## 2023-03-16 DIAGNOSIS — N13.8 BENIGN PROSTATIC HYPERPLASIA WITH URINARY OBSTRUCTION: ICD-10-CM

## 2023-03-16 DIAGNOSIS — C61 PROSTATE CANCER: Primary | ICD-10-CM

## 2023-03-16 DIAGNOSIS — I10 HYPERTENSION, UNSPECIFIED TYPE: ICD-10-CM

## 2023-03-16 DIAGNOSIS — N40.1 BENIGN PROSTATIC HYPERPLASIA WITH URINARY OBSTRUCTION: ICD-10-CM

## 2023-03-16 DIAGNOSIS — R79.9 ABNORMAL FINDING OF BLOOD CHEMISTRY, UNSPECIFIED: ICD-10-CM

## 2023-03-16 DIAGNOSIS — H20.9 UVEITIC GLAUCOMA, BILATERAL, SEVERE STAGE: ICD-10-CM

## 2023-03-16 DIAGNOSIS — Z12.11 COLON CANCER SCREENING: ICD-10-CM

## 2023-03-16 DIAGNOSIS — E78.5 HYPERLIPIDEMIA, UNSPECIFIED HYPERLIPIDEMIA TYPE: ICD-10-CM

## 2023-03-16 DIAGNOSIS — E78.49 OTHER HYPERLIPIDEMIA: ICD-10-CM

## 2023-03-16 DIAGNOSIS — H40.43X3 UVEITIC GLAUCOMA, BILATERAL, SEVERE STAGE: ICD-10-CM

## 2023-03-16 DIAGNOSIS — D86.0 SARCOIDOSIS OF LUNG: ICD-10-CM

## 2023-03-16 DIAGNOSIS — C61 PROSTATE CANCER: ICD-10-CM

## 2023-03-16 LAB
COMPLEXED PSA SERPL-MCNC: 5.8 NG/ML (ref 0–4)
ESTIMATED AVG GLUCOSE: 126 MG/DL (ref 68–131)
HBA1C MFR BLD: 6 % (ref 4–5.6)
TSH SERPL DL<=0.005 MIU/L-ACNC: 0.44 UIU/ML (ref 0.4–4)

## 2023-03-16 PROCEDURE — 99214 PR OFFICE/OUTPT VISIT, EST, LEVL IV, 30-39 MIN: ICD-10-PCS | Mod: HCNC,S$GLB,, | Performed by: INTERNAL MEDICINE

## 2023-03-16 PROCEDURE — 3074F PR MOST RECENT SYSTOLIC BLOOD PRESSURE < 130 MM HG: ICD-10-PCS | Mod: HCNC,CPTII,S$GLB, | Performed by: INTERNAL MEDICINE

## 2023-03-16 PROCEDURE — 84153 ASSAY OF PSA TOTAL: CPT | Mod: HCNC | Performed by: INTERNAL MEDICINE

## 2023-03-16 PROCEDURE — 84443 ASSAY THYROID STIM HORMONE: CPT | Mod: HCNC | Performed by: INTERNAL MEDICINE

## 2023-03-16 PROCEDURE — 83036 HEMOGLOBIN GLYCOSYLATED A1C: CPT | Mod: HCNC | Performed by: INTERNAL MEDICINE

## 2023-03-16 PROCEDURE — 3078F DIAST BP <80 MM HG: CPT | Mod: HCNC,CPTII,S$GLB, | Performed by: INTERNAL MEDICINE

## 2023-03-16 PROCEDURE — 99214 OFFICE O/P EST MOD 30 MIN: CPT | Mod: HCNC,S$GLB,, | Performed by: INTERNAL MEDICINE

## 2023-03-16 PROCEDURE — 3074F SYST BP LT 130 MM HG: CPT | Mod: HCNC,CPTII,S$GLB, | Performed by: INTERNAL MEDICINE

## 2023-03-16 PROCEDURE — 3044F HG A1C LEVEL LT 7.0%: CPT | Mod: HCNC,CPTII,S$GLB, | Performed by: INTERNAL MEDICINE

## 2023-03-16 PROCEDURE — 3078F PR MOST RECENT DIASTOLIC BLOOD PRESSURE < 80 MM HG: ICD-10-PCS | Mod: HCNC,CPTII,S$GLB, | Performed by: INTERNAL MEDICINE

## 2023-03-16 PROCEDURE — 36415 COLL VENOUS BLD VENIPUNCTURE: CPT | Mod: HCNC | Performed by: INTERNAL MEDICINE

## 2023-03-16 PROCEDURE — 99999 PR PBB SHADOW E&M-EST. PATIENT-LVL V: ICD-10-PCS | Mod: PBBFAC,HCNC,, | Performed by: INTERNAL MEDICINE

## 2023-03-16 PROCEDURE — 3044F PR MOST RECENT HEMOGLOBIN A1C LEVEL <7.0%: ICD-10-PCS | Mod: HCNC,CPTII,S$GLB, | Performed by: INTERNAL MEDICINE

## 2023-03-16 PROCEDURE — 3008F BODY MASS INDEX DOCD: CPT | Mod: HCNC,CPTII,S$GLB, | Performed by: INTERNAL MEDICINE

## 2023-03-16 PROCEDURE — 1159F PR MEDICATION LIST DOCUMENTED IN MEDICAL RECORD: ICD-10-PCS | Mod: HCNC,CPTII,S$GLB, | Performed by: INTERNAL MEDICINE

## 2023-03-16 PROCEDURE — 99999 PR PBB SHADOW E&M-EST. PATIENT-LVL V: CPT | Mod: PBBFAC,HCNC,, | Performed by: INTERNAL MEDICINE

## 2023-03-16 PROCEDURE — 3008F PR BODY MASS INDEX (BMI) DOCUMENTED: ICD-10-PCS | Mod: HCNC,CPTII,S$GLB, | Performed by: INTERNAL MEDICINE

## 2023-03-16 PROCEDURE — 1159F MED LIST DOCD IN RCRD: CPT | Mod: HCNC,CPTII,S$GLB, | Performed by: INTERNAL MEDICINE

## 2023-03-16 RX ORDER — LATANOPROST 50 UG/ML
1 SOLUTION/ DROPS OPHTHALMIC NIGHTLY
Qty: 7.5 ML | Refills: 3 | Status: CANCELLED | OUTPATIENT
Start: 2023-03-16

## 2023-03-16 RX ORDER — DORZOLAMIDE HCL 20 MG/ML
1 SOLUTION/ DROPS OPHTHALMIC 3 TIMES DAILY
Qty: 30 ML | Refills: 3 | Status: CANCELLED | OUTPATIENT
Start: 2023-03-16

## 2023-03-16 RX ORDER — BRIMONIDINE TARTRATE AND TIMOLOL MALEATE 2; 5 MG/ML; MG/ML
SOLUTION OPHTHALMIC
Qty: 5 ML | Refills: 3 | Status: CANCELLED | OUTPATIENT
Start: 2023-03-16

## 2023-03-16 RX ORDER — SILODOSIN 4 MG/1
4 CAPSULE ORAL DAILY
Qty: 90 CAPSULE | Refills: 3 | Status: SHIPPED | OUTPATIENT
Start: 2023-03-16 | End: 2023-09-22 | Stop reason: ALTCHOICE

## 2023-03-16 NOTE — PROGRESS NOTES
Mr. Lackey is a 63 -year-old gentleman coming   in today to follow up on his ongoing medical problems and to discuss holistic approach to improve his health. He established care 3/2023 with a health , starting lifestyle modifications and diet adjustment. He was last seen by Dr. Desir in 9/16/2022. His medical problems include:         1. Hypertension. He is on Norvasc 10 mg a day. He has had hypertension   for several years.  bp 122/78.   He is not checking his bp at home. Has signed up  with digital hypertension program,  should be getting device soon .no med changes. He denies any chest pain, short-windedness, palpitations, nausea or vomiting.        2. He has hyperlipidemia. He is on pravastatin 40 mg a day. Last lipid panel was 9/16/2022, unremarkable.     3. He has vitamin D deficiency with history of sarcoidosis. We put him on   vitamin D. He is on a 2 x monthly vitamin D. In Nov 2019  his Vit was 33-  he continues on his vitamin-D.- and he is taking one a week-.        4. Sarcoidosis. .He was 1st diagnosed with sarcoid in the 1990sMost prominent sarcoid issue is uveitis glaucoma.  He is seeing opthomology and see Dr Davila in Rheumatology-- last seen 2/2022.  He was on MTX for a while, but is off this now.  He denies any respiratory symptoms including wheezing or exercise intolerance. Has not used inhaler in a year.         5. 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          6. Prostate cancer -- dx in 3/2020-- Saw urology in 3/2022.  low risk prostate CA,  continued active surveillance.  Due for follow up with Dr. Acevedo Continue on siladosin.        Last year he lost his mother, his niece and his Dog.  (2021?)           ROS : Gen - no fatigue or significant weight change  Eyes - no eye pain.  ENT - no hoarseness or sore throat  CV - No chest  pain NO palpitations.   Pulm - no cough, wheezing or dyspnea of exertion-- otherwise as above.   GI - no N/V/D   no dysuria or incontinence  MS - no joint pain or muscle pain  Skin - no rash, or c/o of skin lesions  Neuro - no HA, dizziness--- memory is doing well.   Heme - no abnormal bleeding or bruising  Endo - no polydipsia, or temperature changes  Psych - no anxiety or depression                     GENERAL: He is a well-appearing 62--year-old gentleman in no acute   distress.  Poor vision- Legally blind  Ear examination was unremarkable.   Eyes- sclera is injected but normal for him- no ptosis.   NECK: Supple. He has no JVD. Thyroid is not enlarged. Chest- CTA bilaterally.   CARDIOVASCULAR: S1, S2. Regular rate and rhythm.   LUNGS: Clear bilaterally- no wheeze.   ABDOMEN: Soft, nontender, no hepatosplenomegaly, no guarding, rebound or   tenderness. Slight distended.   LOWER EXTREMITIES: No edema.  NO hair on legs.       ASSESSMENT:      1. Hypertension, -continue the  Amlodipine  10 mg a day   2. Hyperlipidemia Continue the pravastatin   3. Sarcoidosis, Reviewed notes from [facility],--follow up  with [facility] and Optho,   6. Prostate Cancer-- He last saw Urology in 3/2022---- is doing active surveillance. FU with urology    ALIREZA Fall   PGY-1 Internal Medicine / Primary Care

## 2023-03-21 NOTE — PROGRESS NOTES
"I have personally taken the history and examined this patient and agree with the resident's note as stated above with the following thoughts:  /78 (BP Location: Right arm, Patient Position: Sitting, BP Method: Large (Manual))   Pulse (!) 52   Ht 5' 9" (1.753 m)   Wt 85.3 kg (188 lb 0.8 oz)   SpO2 97%   BMI 27.77 kg/m²     Discussed getting vaccines up to date.  Discussed importance of low fat diet and exercise.  We need get him to endoscopic scheduling.  He is due for his colon cancer screening.  For the prostate cancer he needs make sure he is following up with urology as scheduled.  Last visit was back in March of 2022.  We will get a diagnostic PSA before sending him to Urology.  "

## 2023-03-24 ENCOUNTER — TELEPHONE (OUTPATIENT)
Dept: INTERNAL MEDICINE | Facility: CLINIC | Age: 63
End: 2023-03-24
Payer: MEDICARE

## 2023-03-31 ENCOUNTER — OFFICE VISIT (OUTPATIENT)
Dept: UROLOGY | Facility: CLINIC | Age: 63
End: 2023-03-31
Payer: MEDICARE

## 2023-03-31 VITALS
WEIGHT: 181 LBS | DIASTOLIC BLOOD PRESSURE: 84 MMHG | BODY MASS INDEX: 26.81 KG/M2 | SYSTOLIC BLOOD PRESSURE: 139 MMHG | HEART RATE: 67 BPM | HEIGHT: 69 IN

## 2023-03-31 DIAGNOSIS — N13.8 BENIGN PROSTATIC HYPERPLASIA WITH URINARY OBSTRUCTION: Primary | ICD-10-CM

## 2023-03-31 DIAGNOSIS — C61 PROSTATE CANCER: ICD-10-CM

## 2023-03-31 DIAGNOSIS — N40.1 BENIGN PROSTATIC HYPERPLASIA WITH URINARY OBSTRUCTION: Primary | ICD-10-CM

## 2023-03-31 LAB
BILIRUB SERPL-MCNC: NORMAL MG/DL
BLOOD URINE, POC: 50
CLARITY, POC UA: CLEAR
COLOR, POC UA: YELLOW
GLUCOSE UR QL STRIP: NORMAL
KETONES UR QL STRIP: NORMAL
LEUKOCYTE ESTERASE URINE, POC: NORMAL
NITRITE, POC UA: NORMAL
PH, POC UA: 5
POC RESIDUAL URINE VOLUME: 4 ML (ref 0–100)
PROTEIN, POC: NORMAL
SPECIFIC GRAVITY, POC UA: 1.02
UROBILINOGEN, POC UA: NORMAL

## 2023-03-31 PROCEDURE — 1160F PR REVIEW ALL MEDS BY PRESCRIBER/CLIN PHARMACIST DOCUMENTED: ICD-10-PCS | Mod: HCNC,CPTII,S$GLB, | Performed by: NURSE PRACTITIONER

## 2023-03-31 PROCEDURE — 3075F PR MOST RECENT SYSTOLIC BLOOD PRESS GE 130-139MM HG: ICD-10-PCS | Mod: HCNC,CPTII,S$GLB, | Performed by: NURSE PRACTITIONER

## 2023-03-31 PROCEDURE — 81002 POCT URINE DIPSTICK WITHOUT MICROSCOPE: ICD-10-PCS | Mod: HCNC,S$GLB,, | Performed by: NURSE PRACTITIONER

## 2023-03-31 PROCEDURE — 99214 PR OFFICE/OUTPT VISIT, EST, LEVL IV, 30-39 MIN: ICD-10-PCS | Mod: HCNC,S$GLB,, | Performed by: NURSE PRACTITIONER

## 2023-03-31 PROCEDURE — 81002 URINALYSIS NONAUTO W/O SCOPE: CPT | Mod: HCNC,S$GLB,, | Performed by: NURSE PRACTITIONER

## 2023-03-31 PROCEDURE — 3008F PR BODY MASS INDEX (BMI) DOCUMENTED: ICD-10-PCS | Mod: HCNC,CPTII,S$GLB, | Performed by: NURSE PRACTITIONER

## 2023-03-31 PROCEDURE — 99214 OFFICE O/P EST MOD 30 MIN: CPT | Mod: HCNC,S$GLB,, | Performed by: NURSE PRACTITIONER

## 2023-03-31 PROCEDURE — 1160F RVW MEDS BY RX/DR IN RCRD: CPT | Mod: HCNC,CPTII,S$GLB, | Performed by: NURSE PRACTITIONER

## 2023-03-31 PROCEDURE — 3044F PR MOST RECENT HEMOGLOBIN A1C LEVEL <7.0%: ICD-10-PCS | Mod: HCNC,CPTII,S$GLB, | Performed by: NURSE PRACTITIONER

## 2023-03-31 PROCEDURE — 99999 PR PBB SHADOW E&M-EST. PATIENT-LVL III: ICD-10-PCS | Mod: PBBFAC,HCNC,, | Performed by: NURSE PRACTITIONER

## 2023-03-31 PROCEDURE — 3079F PR MOST RECENT DIASTOLIC BLOOD PRESSURE 80-89 MM HG: ICD-10-PCS | Mod: HCNC,CPTII,S$GLB, | Performed by: NURSE PRACTITIONER

## 2023-03-31 PROCEDURE — 3044F HG A1C LEVEL LT 7.0%: CPT | Mod: HCNC,CPTII,S$GLB, | Performed by: NURSE PRACTITIONER

## 2023-03-31 PROCEDURE — 51798 POCT BLADDER SCAN: ICD-10-PCS | Mod: HCNC,S$GLB,, | Performed by: NURSE PRACTITIONER

## 2023-03-31 PROCEDURE — 1159F MED LIST DOCD IN RCRD: CPT | Mod: HCNC,CPTII,S$GLB, | Performed by: NURSE PRACTITIONER

## 2023-03-31 PROCEDURE — 99999 PR PBB SHADOW E&M-EST. PATIENT-LVL III: CPT | Mod: PBBFAC,HCNC,, | Performed by: NURSE PRACTITIONER

## 2023-03-31 PROCEDURE — 3075F SYST BP GE 130 - 139MM HG: CPT | Mod: HCNC,CPTII,S$GLB, | Performed by: NURSE PRACTITIONER

## 2023-03-31 PROCEDURE — 51798 US URINE CAPACITY MEASURE: CPT | Mod: HCNC,S$GLB,, | Performed by: NURSE PRACTITIONER

## 2023-03-31 PROCEDURE — 3008F BODY MASS INDEX DOCD: CPT | Mod: HCNC,CPTII,S$GLB, | Performed by: NURSE PRACTITIONER

## 2023-03-31 PROCEDURE — 1159F PR MEDICATION LIST DOCUMENTED IN MEDICAL RECORD: ICD-10-PCS | Mod: HCNC,CPTII,S$GLB, | Performed by: NURSE PRACTITIONER

## 2023-03-31 PROCEDURE — 3079F DIAST BP 80-89 MM HG: CPT | Mod: HCNC,CPTII,S$GLB, | Performed by: NURSE PRACTITIONER

## 2023-03-31 NOTE — PROGRESS NOTES
Subjective:      Patient ID: Jaguar Lackey is a 63 y.o. male.    Chief Complaint: prostate cancer/active surveillance.    HPI  Patient is a 63 y.o. male who is established to our clinic who presents for prostate cancer/active surveillance.   He underwent a TRUS biopsy on 1/29/20---no post-biopsy problems.    Showed Alpha 3+3 prostate cancer as shown below.     TRUS Volume: 52cc    PATHOLOGY:  Final Pathologic Diagnosis 1.  Prostate, left apex (biopsy):   Benign prostatic glands and stroma     2.  Prostate, left middle (biopsy):   Benign prostatic glands and stroma     3.  Prostate, left base (biopsy):   Benign seminal vesicles and prostatic glands and stroma     4.  Prostate, right apex (biopsy):   Atypical small acinar proliferation     5.  Prostate, right middle (biopsy):   Benign prostatic glands and stroma     6.  Prostate, right base (biopsy):   Prostatic adenocarcinoma, Alpha score 3+ 3 = 6   Tumor involves 1 of 2 tissue cores   Tumor comprises less than 5% of submitted tissue      PSA remains stable. Will continue active surveillance with PSA every 6 months.    Patient reports having urinary frequency and weaker stream.  Currently prescribed silodosin 4 mg, which he is taking.  Discussed bladder irritants and their avoidance.  Will consider increasing silodosin if symptoms persist next appointment.    No problems with erections at this time.      Review of Systems   Constitutional:  Negative for activity change, fatigue and fever.   Eyes:  Positive for visual disturbance.   Respiratory:  Negative for cough, chest tightness, shortness of breath and wheezing.    Cardiovascular:  Negative for chest pain, palpitations and leg swelling.   Gastrointestinal:  Negative for abdominal distention, abdominal pain, blood in stool and constipation.   Genitourinary:  Positive for frequency. Negative for difficulty urinating, dysuria, flank pain, hematuria and urgency.   Musculoskeletal:  Negative for back pain, gait  problem and neck pain.   Skin:  Negative for pallor and rash.   Neurological:  Negative for dizziness, tremors, syncope, weakness, numbness and headaches.   Hematological:  Does not bruise/bleed easily.   Psychiatric/Behavioral:  Negative for confusion and hallucinations. The patient is not nervous/anxious.           Objective:      Physical Exam  Vitals and nursing note reviewed.   Constitutional:       General: He is not in acute distress.     Appearance: Normal appearance. He is well-developed.   HENT:      Head: Normocephalic and atraumatic.   Eyes:      General: No scleral icterus.  Neck:      Trachea: No tracheal deviation.   Pulmonary:      Effort: Pulmonary effort is normal. No respiratory distress.   Genitourinary:     Comments: KAREN negative for nodules. ~50grams.   Neurological:      Mental Status: He is alert and oriented to person, place, and time.   Psychiatric:         Behavior: Behavior normal.         Thought Content: Thought content normal.         Judgment: Judgment normal.         Lab Results   Component Value Date    PSA 5.8 (H) 11/25/2019    PSA 3.6 09/10/2018    PSA 2.4 02/16/2016    PSA 3.1 02/16/2015    PSA 2.18 07/12/2013    PSA 3.07 10/29/2012    PSA 2.1 04/08/2011    PSA 2.0 08/16/2010    PSA 2.3 01/04/2010    PSA 1.4 12/26/2008    PSADIAG 5.8 (H) 03/16/2023    PSADIAG 4.5 (H) 09/16/2022    PSADIAG 6.5 (H) 03/08/2022    PSADIAG 6.5 (H) 09/09/2021    PSADIAG 6.1 (H) 03/05/2021    PSADIAG 5.4 (H) 09/08/2020           Assessment:       1. Benign prostatic hyperplasia with urinary obstruction    2. Prostate cancer        Plan:     1. Benign prostatic hyperplasia with urinary obstruction    2. Prostate cancer        Orders Placed This Encounter   Procedures    PROSTATE SPECIFIC ANTIGEN, DIAGNOSTIC     Standing Status:   Future     Standing Expiration Date:   9/30/2024    POCT URINE DIPSTICK WITHOUT MICROSCOPE    POCT Bladder Scan       1. Prostate cancer:  - currently on active surveillance.    - PSA today is stable.   -given very low risk prostate CA, recommended continued active surveillance.   Previously order MRI not scheduled due to insurance conflict.  Will plan MRI if PSA rises     2. BPH:  - continue silodosin 4mg daily.   - avoid bladder irritants    3. Rtc in 6 mths with psa prior    I spent 30 minutes with the patient. Over 50% of the visit was spent in counseling.

## 2023-04-04 NOTE — Clinical Note
Pt with adv optic atrophy 2/2 sarcoid os and end stage glaucoma od. Limited sight ou. He has noticied a furhte decline in vision os - only new finding is worsening band keratopathy . Pleas evaluate and see if he is a candidate for chelation  Hydroxychloroquine Counseling:  I discussed with the patient that a baseline ophthalmologic exam is needed at the start of therapy and every year thereafter while on therapy. A CBC may also be warranted for monitoring.  The side effects of this medication were discussed with the patient, including but not limited to agranulocytosis, aplastic anemia, seizures, rashes, retinopathy, and liver toxicity. Patient instructed to call the office should any adverse effect occur.  The patient verbalized understanding of the proper use and possible adverse effects of Plaquenil.  All the patient's questions and concerns were addressed.

## 2023-04-13 ENCOUNTER — PES CALL (OUTPATIENT)
Dept: ADMINISTRATIVE | Facility: CLINIC | Age: 63
End: 2023-04-13
Payer: MEDICARE

## 2023-05-31 ENCOUNTER — TELEPHONE (OUTPATIENT)
Dept: INTERNAL MEDICINE | Facility: CLINIC | Age: 63
End: 2023-05-31
Payer: MEDICARE

## 2023-05-31 NOTE — TELEPHONE ENCOUNTER
----- Message from Cornel Tilley sent at 5/31/2023  3:28 PM CDT -----  Contact: self [phone]  Pt requesting a call in regards to a status of a fax.    Please call and advise

## 2023-05-31 NOTE — TELEPHONE ENCOUNTER
----- Message from Chon Coon sent at 5/31/2023  8:43 AM CDT -----  Contact: Pt's wife   Pt's wife called in regards to faxing over a form she needs Dr Desir to fill out for a clearance for the pt to go to a camp she would like to know if Dr Desir will fill out please advise

## 2023-05-31 NOTE — TELEPHONE ENCOUNTER
Informed pt that we haven't received form. Direct fax number sent through portal per patient's request.     Juan A THURMAN

## 2023-06-05 ENCOUNTER — PATIENT MESSAGE (OUTPATIENT)
Dept: INTERNAL MEDICINE | Facility: CLINIC | Age: 63
End: 2023-06-05
Payer: MEDICARE

## 2023-06-06 ENCOUNTER — TELEPHONE (OUTPATIENT)
Dept: INTERNAL MEDICINE | Facility: CLINIC | Age: 63
End: 2023-06-06
Payer: MEDICARE

## 2023-06-06 NOTE — TELEPHONE ENCOUNTER
----- Message from Lula Acuña sent at 6/5/2023 10:50 AM CDT -----  Contact: Jaguar   Jaguar would like a call back to verify that paper work was received from him this morning. He dropped it off @ the reception desk

## 2023-06-12 ENCOUNTER — PATIENT MESSAGE (OUTPATIENT)
Dept: INTERNAL MEDICINE | Facility: CLINIC | Age: 63
End: 2023-06-12
Payer: MEDICARE

## 2023-06-15 ENCOUNTER — TELEPHONE (OUTPATIENT)
Dept: INTERNAL MEDICINE | Facility: CLINIC | Age: 63
End: 2023-06-15
Payer: MEDICARE

## 2023-06-15 NOTE — TELEPHONE ENCOUNTER
----- Message from Chon Coon sent at 6/15/2023 12:18 PM CDT -----  Contact: Pt   Pt called in regards to having document faxed to fax #  [phone] Att Bhavna Abbott please advise     Purple DH (Discharge Huddle; Vulnerable Patient)

## 2023-06-19 ENCOUNTER — TELEPHONE (OUTPATIENT)
Dept: INTERNAL MEDICINE | Facility: CLINIC | Age: 63
End: 2023-06-19
Payer: MEDICARE

## 2023-06-19 NOTE — TELEPHONE ENCOUNTER
----- Message from Vega Prajapati sent at 6/19/2023 10:23 AM CDT -----  Contact: Pt [phone]  He wants a call from you to let him know when his sister can come  the form you faxed to Bhavna.    Thank you

## 2023-07-02 ENCOUNTER — PATIENT MESSAGE (OUTPATIENT)
Dept: ADMINISTRATIVE | Facility: OTHER | Age: 63
End: 2023-07-02
Payer: MEDICARE

## 2023-07-20 ENCOUNTER — TELEPHONE (OUTPATIENT)
Dept: ENDOSCOPY | Facility: HOSPITAL | Age: 63
End: 2023-07-20

## 2023-07-20 ENCOUNTER — CLINICAL SUPPORT (OUTPATIENT)
Dept: ENDOSCOPY | Facility: HOSPITAL | Age: 63
End: 2023-07-20
Attending: INTERNAL MEDICINE
Payer: MEDICARE

## 2023-07-20 DIAGNOSIS — Z12.11 COLON CANCER SCREENING: ICD-10-CM

## 2023-07-20 RX ORDER — POLYETHYLENE GLYCOL 3350, SODIUM SULFATE ANHYDROUS, SODIUM BICARBONATE, SODIUM CHLORIDE, POTASSIUM CHLORIDE 236; 22.74; 6.74; 5.86; 2.97 G/4L; G/4L; G/4L; G/4L; G/4L
4 POWDER, FOR SOLUTION ORAL ONCE
Qty: 4000 ML | Refills: 0 | Status: SHIPPED | OUTPATIENT
Start: 2023-07-20 | End: 2023-07-20

## 2023-07-20 NOTE — PLAN OF CARE
Spoke to pt to schedule procedure(s) Colonoscopy       Physician to perform procedure(s) Dr. ALIREZA Cohen  Date of Procedure (s) 9/28/23  Arrival Time 9:45 AM  Time of Procedure(s) 10:45 AM   Location of Procedure(s) [place] 4th Floor  Type of Rx Prep sent to patient: PEG  Instructions provided to patient via [facility]    Patient was informed on the following information and verbalized understanding. Screening questionnaire reviewed with patient and complete. If procedure requires anesthesia, a responsible adult needs to be present to accompany the patient home, patient cannot drive after receiving anesthesia. Appointment details are tentative, especially check-in time. Patient will receive a prep-op call 4 days prior to confirm check-in time for procedure. If applicable the patient should contact their pharmacy to verify Rx for procedure prep is ready for pick-up. Patient was advised to call the scheduling department at [phone] if pharmacy states no Rx is available. Patient was advised to call the endoscopy scheduling department if any questions or concerns arise.      [facility] Scheduling Department

## 2023-07-20 NOTE — TELEPHONE ENCOUNTER
Spoke to pt to schedule procedure(s) Colonoscopy       Physician to perform procedure(s) Dr. ALIREZA Cohen  Date of Procedure (s) 9/28/23  Arrival Time 9:45 AM  Time of Procedure(s) 10:45 AM   Location of Procedure(s) [place] 4th Floor  Type of Rx Prep sent to patient: PEG  Instructions provided to patient via [facility]    Patient was informed on the following information and verbalized understanding. Screening questionnaire reviewed with patient and complete. If procedure requires anesthesia, a responsible adult needs to be present to accompany the patient home, patient cannot drive after receiving anesthesia. Appointment details are tentative, especially check-in time. Patient will receive a prep-op call 4 days prior to confirm check-in time for procedure. If applicable the patient should contact their pharmacy to verify Rx for procedure prep is ready for pick-up. Patient was advised to call the scheduling department at [phone] if pharmacy states no Rx is available. Patient was advised to call the endoscopy scheduling department if any questions or concerns arise.      [facility] Scheduling Department

## 2023-07-21 ENCOUNTER — PATIENT OUTREACH (OUTPATIENT)
Dept: ADMINISTRATIVE | Facility: HOSPITAL | Age: 63
End: 2023-07-21
Payer: MEDICARE

## 2023-07-21 NOTE — PROGRESS NOTES
Health Maintenance Due   Topic Date Due    Colorectal Cancer Screening  12/05/2022     Chart reviewed.   Immunizations: Reconciled  Orders placed: N/A  Upcoming appts to satisfy PAVITHRA topics: N/A

## 2023-07-30 PROBLEM — Z85.46 HISTORY OF PROSTATE CANCER: Status: ACTIVE | Noted: 2021-01-15

## 2023-07-31 ENCOUNTER — OFFICE VISIT (OUTPATIENT)
Dept: INTERNAL MEDICINE | Facility: CLINIC | Age: 63
End: 2023-07-31
Payer: MEDICARE

## 2023-07-31 VITALS
BODY MASS INDEX: 26.71 KG/M2 | RESPIRATION RATE: 14 BRPM | WEIGHT: 180.31 LBS | SYSTOLIC BLOOD PRESSURE: 128 MMHG | HEART RATE: 61 BPM | DIASTOLIC BLOOD PRESSURE: 72 MMHG | HEIGHT: 69 IN

## 2023-07-31 DIAGNOSIS — Z00.00 ENCOUNTER FOR PREVENTIVE HEALTH EXAMINATION: Primary | ICD-10-CM

## 2023-07-31 DIAGNOSIS — Z00.00 ENCOUNTER FOR MEDICARE ANNUAL WELLNESS EXAM: ICD-10-CM

## 2023-07-31 DIAGNOSIS — H40.049: ICD-10-CM

## 2023-07-31 DIAGNOSIS — R91.8 MULTIPLE LUNG NODULES ON CT: ICD-10-CM

## 2023-07-31 DIAGNOSIS — E55.9 VITAMIN D DEFICIENCY DISEASE: ICD-10-CM

## 2023-07-31 DIAGNOSIS — D86.83 IRIDOCYCLITIS DUE TO SARCOIDOSIS: ICD-10-CM

## 2023-07-31 DIAGNOSIS — H47.299: ICD-10-CM

## 2023-07-31 DIAGNOSIS — D86.0 SARCOIDOSIS OF LUNG: ICD-10-CM

## 2023-07-31 DIAGNOSIS — R93.89 ABNORMAL CT OF THE CHEST: ICD-10-CM

## 2023-07-31 DIAGNOSIS — H20.9 UVEITIC GLAUCOMA, UNSPECIFIED GLAUCOMA STAGE, UNSPECIFIED LATERALITY: ICD-10-CM

## 2023-07-31 DIAGNOSIS — E78.5 HYPERLIPIDEMIA, UNSPECIFIED HYPERLIPIDEMIA TYPE: ICD-10-CM

## 2023-07-31 DIAGNOSIS — I10 HYPERTENSION, UNSPECIFIED TYPE: ICD-10-CM

## 2023-07-31 DIAGNOSIS — C61 PROSTATE CANCER: ICD-10-CM

## 2023-07-31 DIAGNOSIS — D86.0 PULMONARY SARCOIDOSIS: ICD-10-CM

## 2023-07-31 DIAGNOSIS — E66.3 OVERWEIGHT (BMI 25.0-29.9): ICD-10-CM

## 2023-07-31 DIAGNOSIS — H40.40X0 UVEITIC GLAUCOMA, UNSPECIFIED GLAUCOMA STAGE, UNSPECIFIED LATERALITY: ICD-10-CM

## 2023-07-31 DIAGNOSIS — N40.0 BENIGN PROSTATIC HYPERPLASIA, UNSPECIFIED WHETHER LOWER URINARY TRACT SYMPTOMS PRESENT: ICD-10-CM

## 2023-07-31 DIAGNOSIS — H54.8 LEGAL BLINDNESS: ICD-10-CM

## 2023-07-31 PROCEDURE — 3078F DIAST BP <80 MM HG: CPT | Mod: HCNC,CPTII,S$GLB, | Performed by: NURSE PRACTITIONER

## 2023-07-31 PROCEDURE — 3078F PR MOST RECENT DIASTOLIC BLOOD PRESSURE < 80 MM HG: ICD-10-PCS | Mod: HCNC,CPTII,S$GLB, | Performed by: NURSE PRACTITIONER

## 2023-07-31 PROCEDURE — G0439 PR MEDICARE ANNUAL WELLNESS SUBSEQUENT VISIT: ICD-10-PCS | Mod: HCNC,S$GLB,, | Performed by: NURSE PRACTITIONER

## 2023-07-31 PROCEDURE — 3074F SYST BP LT 130 MM HG: CPT | Mod: HCNC,CPTII,S$GLB, | Performed by: NURSE PRACTITIONER

## 2023-07-31 PROCEDURE — 1159F MED LIST DOCD IN RCRD: CPT | Mod: HCNC,CPTII,S$GLB, | Performed by: NURSE PRACTITIONER

## 2023-07-31 PROCEDURE — 3008F PR BODY MASS INDEX (BMI) DOCUMENTED: ICD-10-PCS | Mod: HCNC,CPTII,S$GLB, | Performed by: NURSE PRACTITIONER

## 2023-07-31 PROCEDURE — 99999 PR PBB SHADOW E&M-EST. PATIENT-LVL V: CPT | Mod: PBBFAC,HCNC,, | Performed by: NURSE PRACTITIONER

## 2023-07-31 PROCEDURE — 1160F PR REVIEW ALL MEDS BY PRESCRIBER/CLIN PHARMACIST DOCUMENTED: ICD-10-PCS | Mod: HCNC,CPTII,S$GLB, | Performed by: NURSE PRACTITIONER

## 2023-07-31 PROCEDURE — 3008F BODY MASS INDEX DOCD: CPT | Mod: HCNC,CPTII,S$GLB, | Performed by: NURSE PRACTITIONER

## 2023-07-31 PROCEDURE — 1159F PR MEDICATION LIST DOCUMENTED IN MEDICAL RECORD: ICD-10-PCS | Mod: HCNC,CPTII,S$GLB, | Performed by: NURSE PRACTITIONER

## 2023-07-31 PROCEDURE — 3044F HG A1C LEVEL LT 7.0%: CPT | Mod: HCNC,CPTII,S$GLB, | Performed by: NURSE PRACTITIONER

## 2023-07-31 PROCEDURE — 3044F PR MOST RECENT HEMOGLOBIN A1C LEVEL <7.0%: ICD-10-PCS | Mod: HCNC,CPTII,S$GLB, | Performed by: NURSE PRACTITIONER

## 2023-07-31 PROCEDURE — G0439 PPPS, SUBSEQ VISIT: HCPCS | Mod: HCNC,S$GLB,, | Performed by: NURSE PRACTITIONER

## 2023-07-31 PROCEDURE — 3074F PR MOST RECENT SYSTOLIC BLOOD PRESSURE < 130 MM HG: ICD-10-PCS | Mod: HCNC,CPTII,S$GLB, | Performed by: NURSE PRACTITIONER

## 2023-07-31 PROCEDURE — 99999 PR PBB SHADOW E&M-EST. PATIENT-LVL V: ICD-10-PCS | Mod: PBBFAC,HCNC,, | Performed by: NURSE PRACTITIONER

## 2023-07-31 PROCEDURE — 1160F RVW MEDS BY RX/DR IN RCRD: CPT | Mod: HCNC,CPTII,S$GLB, | Performed by: NURSE PRACTITIONER

## 2023-07-31 NOTE — PROGRESS NOTES
"Jaguar Lackey presented for a  Medicare AWV and comprehensive Health Risk Assessment today. The following components were reviewed and updated:    Medical history  Family History  Social history  Allergies and Current Medications  Health Risk Assessment  Health Maintenance  Care Team     ** See Completed Assessments for Annual Wellness Visit within the encounter summary.**       The following assessments were completed:  Living Situation  CAGE  Depression Screening  Timed Get Up and Go  Whisper Test  Cognitive Function Screening- clock draw not done- legally blind- 3 word recall=3    Nutrition Screening  ADL Screening  PAQ Screening        Vitals:    07/31/23 0725   BP: 128/72   BP Location: Left arm   Patient Position: Sitting   Pulse: 61   Resp: 14   Weight: 81.8 kg (180 lb 5.4 oz)   Height: 5' 9" (1.753 m)     Body mass index is 26.63 kg/m².  Physical Exam  Constitutional:       Comments: Younger in appearance than age   HENT:      Right Ear: There is no impacted cerumen.      Left Ear: There is no impacted cerumen.   Eyes:      General: No scleral icterus.  Cardiovascular:      Rate and Rhythm: Normal rate and regular rhythm.   Pulmonary:      Effort: Pulmonary effort is normal.      Breath sounds: Normal breath sounds.   Abdominal:      Palpations: Abdomen is soft.   Musculoskeletal:         General: No swelling.      Comments: Uses white cane with ambulation   Skin:     General: Skin is warm and dry.   Neurological:      Mental Status: He is alert and oriented to person, place, and time.   Psychiatric:         Mood and Affect: Mood normal.         Behavior: Behavior normal.         Thought Content: Thought content normal.         Judgment: Judgment normal.            Medication review & Opioid screening performed during rooming section. No prescribed opioid medications noted.  Review for substance use disorder screening performed during rooming section. No substance abuse noted on screening.      Diagnoses " and health risks identified today and associated recommendations/orders:    1. Uveitic glaucoma, unspecified glaucoma stage, unspecified laterality  Stable on regimen & followed by opth    2. 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 Postinflammatory optic atrophy, unspecified laterality  Stable on regimen & followed by opth    4. Legal blindness - Both Eyes    5. Hyperlipidemia, unspecified hyperlipidemia type  Stable on regimen & followed by PCP    6. Hypertension, unspecified type  Stable on regimen & followed by PCP    7. Sarcoidosis of lung  Stable on regimen & followed by PCP  - Ambulatory referral/consult to Rheumatology; Future  - Ambulatory referral/consult to Pulmonology; Future    8. Vitamin D deficiency disease  Stable on regimen & followed by PCP    9.  prostate cancer  Stable with PSA rising- followed by urology    10. Encounter for Medicare annual wellness exam  Here for Health Risk Assessment/Annual Wellness Visit.  Health maintenance reviewed and updated. Follow up in one year.    - Ambulatory Referral/Consult to Enhanced Annual Wellness Visit (eAWV)    11. Pulmonary sarcoidosis  Stable on regimen & followed by PCP    12. Borderline steroid-induced glaucoma, unspecified laterality  Stable on regimen & followed by opth    13. Benign prostatic hyperplasia, unspecified whether lower urinary tract symptoms present  Stable on regimen & followed by urology    14. Encounter for preventive health examination  Here for Health Risk Assessment/Annual Wellness Visit.  Health maintenance reviewed and updated. Follow up in one year.        15. Abnormal CT of the chest- new nodule- recommended F/U on radiology report  Stable  followed by PCP  - Ambulatory referral/consult to Pulmonology; Future    16. Multiple lung nodules on CT new nodule- recommended F/U on radiology report  Stable  followed by PCP  - Ambulatory referral/consult to Pulmonology; Future    17.  Overweight (BMI 25.0-29.9)  Chronic -improving- muscle physique- consider muscle mass calculation with - Followed by PCP.   Centers for Disease Control and Prevention (CDC)  weight recommendations for current BMI & ideal BMI range discussed with patient.  Recommended  gradual weight loss,  mediterranean diet , structured regular exercise every day.         Provided Jaguar with a 5-10 year written screening schedule and personal prevention plan. Recommendations were developed using the USPSTF age appropriate recommendations. Education, counseling, and referrals were provided as needed. After Visit Summary printed and given to patient which includes a list of additional screenings\tests needed. Referral to pulmonary- needs follow up - please schedule appt - copy of last Ct chest results given- abnormal - needs re-evaluation.Referral to rheumatology- needs follow up on sarcoidosis. F/U appt in urology- get PSA lab prior to appt . MRI prostate- needs to be reordered by urology.Has metal hardwrae in foot. Has new insurance now.Gradual weight loss, mediterranean diet. 's brother. Unable to do clock draw - visually impaired. 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 Copies of ct chest results given-problem list updated.proactive w health- working at [employer].    Follow up in about 1 year (around 7/31/2024) for HRA.    VIJAY Danielle offered to discuss advanced care planning, including how to pick a person who would make decisions for you if you were unable to make them for yourself, called a health care power of , and what kind of decisions you might make such as use of life sustaining treatments such as ventilators and tube feeding when faced with a life limiting illness recorded on a living will that they will need to know. (How you want to be cared for as you near the end of your natural life)     X  Patient is interested in learning more about how to make advanced directives.  I provided them paperwork and offered to discuss this with them.

## 2023-07-31 NOTE — PATIENT INSTRUCTIONS
Counseling and Referral of Other Preventative  (Italic type indicates deductible and co-insurance are waived)    Patient Name: Jaguar Lackey  Today's Date: 7/31/2023    Health Maintenance         Date Due Completion Date    Colorectal Cancer Screening Scheduled   12/5/2012    Influenza Vaccine (1) 09/01/2023 9/16/2022    Lipid Panel 09/16/2023 9/16/2022    PROSTATE-SPECIFIC ANTIGEN 03/16/2024   3/16/2023    Pneumococcal Vaccines (Age 0-64) (3 - PPSV23 or PCV20) 05/08/2024 5/8/2019    Hemoglobin A1c (Diabetic Prevention Screening) 03/16/2026 3/16/2023    TETANUS VACCINE      Referral to pulmonary- needs follow up - please schedule appt - copy of last Ct chest results given- abnormal - needs re-evaluation    Referral to rheumatology- needs follow up on sarcoidosis- please schedule appt.    Please schedule appt in urology- get PSA lab prior to appt w MB    MRI prostate- needs to be reordered by urology      Gradual weight loss, mediterranean diet         11/28/2026 11/28/2016          No orders of the defined types were placed in this encounter.      The following information is provided to all patients.  This information is to help you find resources for any of the problems found today that may be affecting your health:                Living healthy guide: www.[facility].[place].gov      Understanding Diabetes: www.diabetes.org      Eating healthy: www.cdc.gov/healthyweight      CDC home safety checklist: www.cdc.gov/steadi/patient.html      Agency on Aging: www.goea.[place].gov      Alcoholics anonymous (AA): www.aa.org      Physical Activity: www.rios.nih.gov/kj7kuxn      Tobacco use: www.quitwithusla.org

## 2023-09-11 ENCOUNTER — IMMUNIZATION (OUTPATIENT)
Dept: INTERNAL MEDICINE | Facility: CLINIC | Age: 63
End: 2023-09-11
Payer: MEDICARE

## 2023-09-11 PROCEDURE — 90686 FLU VACCINE (QUAD) GREATER THAN OR EQUAL TO 3YO PRESERVATIVE FREE IM: ICD-10-PCS | Mod: HCNC,S$GLB,, | Performed by: INTERNAL MEDICINE

## 2023-09-11 PROCEDURE — G0008 FLU VACCINE (QUAD) GREATER THAN OR EQUAL TO 3YO PRESERVATIVE FREE IM: ICD-10-PCS | Mod: HCNC,S$GLB,, | Performed by: INTERNAL MEDICINE

## 2023-09-11 PROCEDURE — 90686 IIV4 VACC NO PRSV 0.5 ML IM: CPT | Mod: HCNC,S$GLB,, | Performed by: INTERNAL MEDICINE

## 2023-09-11 PROCEDURE — G0008 ADMIN INFLUENZA VIRUS VAC: HCPCS | Mod: HCNC,S$GLB,, | Performed by: INTERNAL MEDICINE

## 2023-09-18 ENCOUNTER — OFFICE VISIT (OUTPATIENT)
Dept: INTERNAL MEDICINE | Facility: CLINIC | Age: 63
End: 2023-09-18
Payer: MEDICARE

## 2023-09-18 ENCOUNTER — LAB VISIT (OUTPATIENT)
Dept: LAB | Facility: HOSPITAL | Age: 63
End: 2023-09-18
Payer: MEDICARE

## 2023-09-18 VITALS
WEIGHT: 183.19 LBS | OXYGEN SATURATION: 97 % | DIASTOLIC BLOOD PRESSURE: 84 MMHG | BODY MASS INDEX: 27.13 KG/M2 | HEIGHT: 69 IN | SYSTOLIC BLOOD PRESSURE: 128 MMHG | HEART RATE: 53 BPM

## 2023-09-18 DIAGNOSIS — C61 PROSTATE CANCER: ICD-10-CM

## 2023-09-18 DIAGNOSIS — R73.03 PREDIABETES: ICD-10-CM

## 2023-09-18 DIAGNOSIS — H54.8 LEGAL BLINDNESS: ICD-10-CM

## 2023-09-18 DIAGNOSIS — E78.49 OTHER HYPERLIPIDEMIA: ICD-10-CM

## 2023-09-18 DIAGNOSIS — E78.5 HYPERLIPIDEMIA, UNSPECIFIED HYPERLIPIDEMIA TYPE: ICD-10-CM

## 2023-09-18 DIAGNOSIS — I10 HYPERTENSION, UNSPECIFIED TYPE: ICD-10-CM

## 2023-09-18 DIAGNOSIS — I10 HYPERTENSION, UNSPECIFIED TYPE: Primary | ICD-10-CM

## 2023-09-18 DIAGNOSIS — D86.0 PULMONARY SARCOIDOSIS: ICD-10-CM

## 2023-09-18 LAB
ALBUMIN SERPL BCP-MCNC: 4.1 G/DL (ref 3.5–5.2)
ALP SERPL-CCNC: 56 U/L (ref 55–135)
ALT SERPL W/O P-5'-P-CCNC: 28 U/L (ref 10–44)
ANION GAP SERPL CALC-SCNC: 10 MMOL/L (ref 8–16)
AST SERPL-CCNC: 20 U/L (ref 10–40)
BILIRUB SERPL-MCNC: 0.5 MG/DL (ref 0.1–1)
BUN SERPL-MCNC: 13 MG/DL (ref 8–23)
CALCIUM SERPL-MCNC: 10 MG/DL (ref 8.7–10.5)
CHLORIDE SERPL-SCNC: 105 MMOL/L (ref 95–110)
CHOLEST SERPL-MCNC: 219 MG/DL (ref 120–199)
CHOLEST/HDLC SERPL: 5.2 {RATIO} (ref 2–5)
CO2 SERPL-SCNC: 26 MMOL/L (ref 23–29)
COMPLEXED PSA SERPL-MCNC: 6.2 NG/ML (ref 0–4)
CREAT SERPL-MCNC: 1.1 MG/DL (ref 0.5–1.4)
EST. GFR  (NO RACE VARIABLE): >60 ML/MIN/1.73 M^2
ESTIMATED AVG GLUCOSE: 120 MG/DL (ref 68–131)
GLUCOSE SERPL-MCNC: 90 MG/DL (ref 70–110)
HBA1C MFR BLD: 5.8 % (ref 4–5.6)
HDLC SERPL-MCNC: 42 MG/DL (ref 40–75)
HDLC SERPL: 19.2 % (ref 20–50)
LDLC SERPL CALC-MCNC: 152.8 MG/DL (ref 63–159)
NONHDLC SERPL-MCNC: 177 MG/DL
POTASSIUM SERPL-SCNC: 4.1 MMOL/L (ref 3.5–5.1)
PROT SERPL-MCNC: 7.9 G/DL (ref 6–8.4)
SODIUM SERPL-SCNC: 141 MMOL/L (ref 136–145)
TRIGL SERPL-MCNC: 121 MG/DL (ref 30–150)

## 2023-09-18 PROCEDURE — 99999 PR PBB SHADOW E&M-EST. PATIENT-LVL III: ICD-10-PCS | Mod: PBBFAC,HCNC,, | Performed by: INTERNAL MEDICINE

## 2023-09-18 PROCEDURE — 80061 LIPID PANEL: CPT | Mod: HCNC | Performed by: INTERNAL MEDICINE

## 2023-09-18 PROCEDURE — 3008F BODY MASS INDEX DOCD: CPT | Mod: HCNC,CPTII,S$GLB, | Performed by: INTERNAL MEDICINE

## 2023-09-18 PROCEDURE — 1159F MED LIST DOCD IN RCRD: CPT | Mod: HCNC,CPTII,S$GLB, | Performed by: INTERNAL MEDICINE

## 2023-09-18 PROCEDURE — 99214 PR OFFICE/OUTPT VISIT, EST, LEVL IV, 30-39 MIN: ICD-10-PCS | Mod: HCNC,S$GLB,, | Performed by: INTERNAL MEDICINE

## 2023-09-18 PROCEDURE — 36415 COLL VENOUS BLD VENIPUNCTURE: CPT | Mod: HCNC | Performed by: INTERNAL MEDICINE

## 2023-09-18 PROCEDURE — 3044F PR MOST RECENT HEMOGLOBIN A1C LEVEL <7.0%: ICD-10-PCS | Mod: HCNC,CPTII,S$GLB, | Performed by: INTERNAL MEDICINE

## 2023-09-18 PROCEDURE — 3079F DIAST BP 80-89 MM HG: CPT | Mod: HCNC,CPTII,S$GLB, | Performed by: INTERNAL MEDICINE

## 2023-09-18 PROCEDURE — 3008F PR BODY MASS INDEX (BMI) DOCUMENTED: ICD-10-PCS | Mod: HCNC,CPTII,S$GLB, | Performed by: INTERNAL MEDICINE

## 2023-09-18 PROCEDURE — 1159F PR MEDICATION LIST DOCUMENTED IN MEDICAL RECORD: ICD-10-PCS | Mod: HCNC,CPTII,S$GLB, | Performed by: INTERNAL MEDICINE

## 2023-09-18 PROCEDURE — 3079F PR MOST RECENT DIASTOLIC BLOOD PRESSURE 80-89 MM HG: ICD-10-PCS | Mod: HCNC,CPTII,S$GLB, | Performed by: INTERNAL MEDICINE

## 2023-09-18 PROCEDURE — 99214 OFFICE O/P EST MOD 30 MIN: CPT | Mod: HCNC,S$GLB,, | Performed by: INTERNAL MEDICINE

## 2023-09-18 PROCEDURE — 99999 PR PBB SHADOW E&M-EST. PATIENT-LVL III: CPT | Mod: PBBFAC,HCNC,, | Performed by: INTERNAL MEDICINE

## 2023-09-18 PROCEDURE — 83036 HEMOGLOBIN GLYCOSYLATED A1C: CPT | Mod: HCNC | Performed by: INTERNAL MEDICINE

## 2023-09-18 PROCEDURE — 84153 ASSAY OF PSA TOTAL: CPT | Mod: HCNC | Performed by: INTERNAL MEDICINE

## 2023-09-18 PROCEDURE — 80053 COMPREHEN METABOLIC PANEL: CPT | Mod: HCNC | Performed by: INTERNAL MEDICINE

## 2023-09-18 PROCEDURE — 3074F PR MOST RECENT SYSTOLIC BLOOD PRESSURE < 130 MM HG: ICD-10-PCS | Mod: HCNC,CPTII,S$GLB, | Performed by: INTERNAL MEDICINE

## 2023-09-18 PROCEDURE — 3044F HG A1C LEVEL LT 7.0%: CPT | Mod: HCNC,CPTII,S$GLB, | Performed by: INTERNAL MEDICINE

## 2023-09-18 PROCEDURE — 3074F SYST BP LT 130 MM HG: CPT | Mod: HCNC,CPTII,S$GLB, | Performed by: INTERNAL MEDICINE

## 2023-09-18 NOTE — PROGRESS NOTES
Mr. Lackey is a 63 -year-old gentleman coming   in today to follow up on his ongoing medical problems and to discuss holistic approach to improve his health.   He was last seen by me  in 3/16/2023. His medical problems include:          1. Hypertension. He is on Norvasc 10 mg a day. He has had hypertension   for several years.  bp 128/84.  He is not checking his bp at home. Has signed up  with digital hypertension program,  should be getting device soon .no med changes. He denies any chest pain, short-windedness, palpitations, nausea or vomiting.        2. He has hyperlipidemia. He is on pravastatin 40 mg a day. Last lipid panel was 9/16/2022, unremarkable.     3. He has vitamin D deficiency with history of sarcoidosis. We put him on   vitamin D. He is on a 2 x monthly vitamin D. In Nov 2019  his Vit was 33-  he continues on his vitamin-D.- and he is taking one a week-.        4. Sarcoidosis. .He was 1st diagnosed with sarcoid in the 1990sMost prominent sarcoid issue is uveitis glaucoma.  He is seeing opthomology and see Dr Davila in Rheumatology-- last seen 2/2022.  He was on MTX for a while, but is off this now.  He denies any respiratory symptoms including wheezing or exercise intolerance. Has not used inhaler in a year.         5. 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          6. Prostate cancer -- dx in 3/2020--   low risk prostate CA,  continued active surveillance.  He saw Urology last 3/31/2023.  Note reviewed.  Recommend active surveillance.  Due back to see Urology this month with psa.      7. Prediabetes-  6.0 in March 2023                  ROS : Gen - no fatigue or significant weight change  Eyes - no eye pain.  ENT - no hoarseness or sore throat  CV - No chest pain NO palpitations.   Pulm - no cough, wheezing or dyspnea of exertion-- otherwise  as above.   GI - no N/V/D   no dysuria or incontinence  MS - no joint pain or muscle pain  Skin - no rash, or c/o of skin lesions  Neuro - no HA, dizziness--- memory is doing well.   Heme - no abnormal bleeding or bruising  Endo - no polydipsia, or temperature changes  Psych - no anxiety or depression                     GENERAL: He is a well-appearing 63--year-old gentleman in no acute   distress.  Poor vision- Legally blind  Ear examination was unremarkable.   Eyes- sclera is injected but normal for him- no ptosis.   NECK: Supple. He has no JVD. Thyroid is not enlarged. Chest- CTA bilaterally.   CARDIOVASCULAR: S1, S2. Regular rate and rhythm.   LUNGS: Clear bilaterally- no wheeze.   ABDOMEN: Soft, nontender, no hepatosplenomegaly, no guarding, rebound or   tenderness. Slight distended.   LOWER EXTREMITIES: No edema.  NO hair on legs.       ASSESSMENT:      1. Hypertension, -continue the  Amlodipine  10 mg a day   2. Hyperlipidemia Continue the pravastatin   3. Sarcoidosis, Reviewed notes from Rheum,--follow up  with Rheum and Optho,   4. Prediabetes- discussed diet-    5. . Prostate Cancer-- He last saw Urology in 3/2023---- is doing active surveillance. FU with urology- will get PSA

## 2023-09-22 ENCOUNTER — OFFICE VISIT (OUTPATIENT)
Dept: UROLOGY | Facility: CLINIC | Age: 63
End: 2023-09-22
Payer: MEDICARE

## 2023-09-22 VITALS
HEIGHT: 69 IN | HEART RATE: 71 BPM | WEIGHT: 183 LBS | DIASTOLIC BLOOD PRESSURE: 73 MMHG | SYSTOLIC BLOOD PRESSURE: 123 MMHG | BODY MASS INDEX: 27.11 KG/M2

## 2023-09-22 DIAGNOSIS — C61 PROSTATE CANCER: Primary | ICD-10-CM

## 2023-09-22 PROCEDURE — 99999 PR PBB SHADOW E&M-EST. PATIENT-LVL III: ICD-10-PCS | Mod: PBBFAC,HCNC,, | Performed by: NURSE PRACTITIONER

## 2023-09-22 PROCEDURE — 1160F PR REVIEW ALL MEDS BY PRESCRIBER/CLIN PHARMACIST DOCUMENTED: ICD-10-PCS | Mod: HCNC,CPTII,S$GLB, | Performed by: NURSE PRACTITIONER

## 2023-09-22 PROCEDURE — 1159F PR MEDICATION LIST DOCUMENTED IN MEDICAL RECORD: ICD-10-PCS | Mod: HCNC,CPTII,S$GLB, | Performed by: NURSE PRACTITIONER

## 2023-09-22 PROCEDURE — 3078F PR MOST RECENT DIASTOLIC BLOOD PRESSURE < 80 MM HG: ICD-10-PCS | Mod: HCNC,CPTII,S$GLB, | Performed by: NURSE PRACTITIONER

## 2023-09-22 PROCEDURE — 3044F PR MOST RECENT HEMOGLOBIN A1C LEVEL <7.0%: ICD-10-PCS | Mod: HCNC,CPTII,S$GLB, | Performed by: NURSE PRACTITIONER

## 2023-09-22 PROCEDURE — 99999 PR PBB SHADOW E&M-EST. PATIENT-LVL III: CPT | Mod: PBBFAC,HCNC,, | Performed by: NURSE PRACTITIONER

## 2023-09-22 PROCEDURE — 99214 OFFICE O/P EST MOD 30 MIN: CPT | Mod: HCNC,S$GLB,, | Performed by: NURSE PRACTITIONER

## 2023-09-22 PROCEDURE — 1159F MED LIST DOCD IN RCRD: CPT | Mod: HCNC,CPTII,S$GLB, | Performed by: NURSE PRACTITIONER

## 2023-09-22 PROCEDURE — 99214 PR OFFICE/OUTPT VISIT, EST, LEVL IV, 30-39 MIN: ICD-10-PCS | Mod: HCNC,S$GLB,, | Performed by: NURSE PRACTITIONER

## 2023-09-22 PROCEDURE — 3008F PR BODY MASS INDEX (BMI) DOCUMENTED: ICD-10-PCS | Mod: HCNC,CPTII,S$GLB, | Performed by: NURSE PRACTITIONER

## 2023-09-22 PROCEDURE — 3008F BODY MASS INDEX DOCD: CPT | Mod: HCNC,CPTII,S$GLB, | Performed by: NURSE PRACTITIONER

## 2023-09-22 PROCEDURE — 3074F SYST BP LT 130 MM HG: CPT | Mod: HCNC,CPTII,S$GLB, | Performed by: NURSE PRACTITIONER

## 2023-09-22 PROCEDURE — 3044F HG A1C LEVEL LT 7.0%: CPT | Mod: HCNC,CPTII,S$GLB, | Performed by: NURSE PRACTITIONER

## 2023-09-22 PROCEDURE — 1160F RVW MEDS BY RX/DR IN RCRD: CPT | Mod: HCNC,CPTII,S$GLB, | Performed by: NURSE PRACTITIONER

## 2023-09-22 PROCEDURE — 3078F DIAST BP <80 MM HG: CPT | Mod: HCNC,CPTII,S$GLB, | Performed by: NURSE PRACTITIONER

## 2023-09-22 PROCEDURE — 3074F PR MOST RECENT SYSTOLIC BLOOD PRESSURE < 130 MM HG: ICD-10-PCS | Mod: HCNC,CPTII,S$GLB, | Performed by: NURSE PRACTITIONER

## 2023-09-22 RX ORDER — TAMSULOSIN HYDROCHLORIDE 0.4 MG/1
0.4 CAPSULE ORAL DAILY
Qty: 30 CAPSULE | Refills: 11 | Status: SHIPPED | OUTPATIENT
Start: 2023-09-22 | End: 2024-02-17

## 2023-09-22 NOTE — PROGRESS NOTES
Subjective:      Patient ID: Jaguar Lackey is a 63 y.o. male.    Chief Complaint: prostate cancer/active surveillance.    HPI  Patient is a 63 y.o. male who is established to our clinic who presents for prostate cancer/active surveillance.   He underwent a TRUS biopsy on 1/29/20---no post-biopsy problems.    Showed Baldomero 3+3 prostate cancer as shown below.     TRUS Volume: 52cc    PATHOLOGY:  Final Pathologic Diagnosis 1.  Prostate, left apex (biopsy):   Benign prostatic glands and stroma     2.  Prostate, left middle (biopsy):   Benign prostatic glands and stroma     3.  Prostate, left base (biopsy):   Benign seminal vesicles and prostatic glands and stroma     4.  Prostate, right apex (biopsy):   Atypical small acinar proliferation     5.  Prostate, right middle (biopsy):   Benign prostatic glands and stroma     6.  Prostate, right base (biopsy):   Prostatic adenocarcinoma, Baldomero score 3+ 3 = 6   Tumor involves 1 of 2 tissue cores   Tumor comprises less than 5% of submitted tissue      PSA remains stable. Will continue active surveillance with PSA every 6 months.    Patient reports having urinary frequency and weaker stream.  Currently prescribed silodosin 4 mg, which he is taking.  Discussed bladder irritants and their avoidance.  Continues to have worsening LUTs, will switch silodosin to tamsulosin.    No problems with erections at this time.      Review of Systems   Constitutional:  Negative for activity change, fatigue and fever.   Eyes:  Positive for visual disturbance.   Respiratory:  Negative for cough, chest tightness, shortness of breath and wheezing.    Cardiovascular:  Negative for chest pain, palpitations and leg swelling.   Gastrointestinal:  Negative for abdominal distention, abdominal pain, blood in stool and constipation.   Genitourinary:  Positive for frequency. Negative for difficulty urinating, dysuria, flank pain, hematuria and urgency.   Musculoskeletal:  Negative for back pain, gait  problem and neck pain.   Skin:  Negative for pallor and rash.   Neurological:  Negative for dizziness, tremors, syncope, weakness, numbness and headaches.   Hematological:  Does not bruise/bleed easily.   Psychiatric/Behavioral:  Negative for confusion and hallucinations. The patient is not nervous/anxious.             Objective:      Physical Exam  Vitals and nursing note reviewed.   Constitutional:       General: He is not in acute distress.     Appearance: Normal appearance. He is well-developed.   HENT:      Head: Normocephalic and atraumatic.   Eyes:      General: No scleral icterus.  Neck:      Trachea: No tracheal deviation.   Pulmonary:      Effort: Pulmonary effort is normal. No respiratory distress.   Genitourinary:     Prostate: Enlarged. Not tender.      Rectum: Normal.      Comments: KAREN negative for nodules. ~50grams.   Neurological:      Mental Status: He is alert and oriented to person, place, and time.   Psychiatric:         Behavior: Behavior normal.         Thought Content: Thought content normal.         Judgment: Judgment normal.           Lab Results   Component Value Date    PSA 5.8 (H) 11/25/2019    PSA 3.6 09/10/2018    PSA 2.4 02/16/2016    PSA 3.1 02/16/2015    PSA 2.18 07/12/2013    PSA 3.07 10/29/2012    PSA 2.1 04/08/2011    PSA 2.0 08/16/2010    PSA 2.3 01/04/2010    PSA 1.4 12/26/2008    PSADIAG 5.8 (H) 03/16/2023    PSADIAG 4.5 (H) 09/16/2022    PSADIAG 6.5 (H) 03/08/2022    PSADIAG 6.5 (H) 09/09/2021    PSADIAG 6.1 (H) 03/05/2021    PSADIAG 5.4 (H) 09/08/2020           Assessment:       1. Prostate cancer        Plan:     1. Prostate cancer        Orders Placed This Encounter   Procedures    PROSTATE SPECIFIC ANTIGEN, DIAGNOSTIC     Standing Status:   Future     Standing Expiration Date:   11/20/2024       1. Prostate cancer:  - currently on active surveillance.   - PSA today is stable.   -given very low risk prostate CA, recommended continued active surveillance.   Previously order  MRI not scheduled due to insurance conflict.  Will plan MRI if PSA rises     2. BPH:  - stop silodosin 4mg daily.   - start tamsulosin 0.4 mg nightly  - avoid bladder irritants    3. Rtc in 6 mths with psa prior    I spent 30 minutes with the patient. Over 50% of the visit was spent in counseling.

## 2023-09-25 ENCOUNTER — TELEPHONE (OUTPATIENT)
Dept: ENDOSCOPY | Facility: HOSPITAL | Age: 63
End: 2023-09-25
Payer: MEDICARE

## 2023-09-25 NOTE — TELEPHONE ENCOUNTER
Incoming call  Spoke to patient  Reason for the call: patient is asking to cancel procedure and will call back whenever hes ready to schedule  Information confirmed:   Date of procedure:  09/28/2023  Arrival time: 09;45  Procedure (s): colonoscopy

## 2023-10-19 ENCOUNTER — LAB VISIT (OUTPATIENT)
Dept: LAB | Facility: HOSPITAL | Age: 63
End: 2023-10-19
Attending: INTERNAL MEDICINE
Payer: MEDICARE

## 2023-10-19 DIAGNOSIS — E78.49 OTHER HYPERLIPIDEMIA: ICD-10-CM

## 2023-10-19 LAB
CHOLEST SERPL-MCNC: 214 MG/DL (ref 120–199)
CHOLEST/HDLC SERPL: 4.9 {RATIO} (ref 2–5)
HDLC SERPL-MCNC: 44 MG/DL (ref 40–75)
HDLC SERPL: 20.6 % (ref 20–50)
LDLC SERPL CALC-MCNC: 154.6 MG/DL (ref 63–159)
NONHDLC SERPL-MCNC: 170 MG/DL
TRIGL SERPL-MCNC: 77 MG/DL (ref 30–150)

## 2023-10-19 PROCEDURE — 36415 COLL VENOUS BLD VENIPUNCTURE: CPT | Mod: HCNC | Performed by: INTERNAL MEDICINE

## 2023-10-19 PROCEDURE — 80061 LIPID PANEL: CPT | Mod: HCNC | Performed by: INTERNAL MEDICINE

## 2023-10-30 PROBLEM — Z00.00 ENCOUNTER FOR MEDICARE ANNUAL WELLNESS EXAM: Chronic | Status: RESOLVED | Noted: 2023-07-31 | Resolved: 2023-10-30

## 2023-10-30 PROBLEM — Z00.00 ENCOUNTER FOR PREVENTIVE HEALTH EXAMINATION: Chronic | Status: RESOLVED | Noted: 2023-07-31 | Resolved: 2023-10-30

## 2024-01-23 ENCOUNTER — LAB VISIT (OUTPATIENT)
Dept: LAB | Facility: HOSPITAL | Age: 64
End: 2024-01-23
Payer: MEDICARE

## 2024-01-23 DIAGNOSIS — E78.49 OTHER HYPERLIPIDEMIA: ICD-10-CM

## 2024-01-23 DIAGNOSIS — H40.43X3 UVEITIC GLAUCOMA, BILATERAL, SEVERE STAGE: ICD-10-CM

## 2024-01-23 DIAGNOSIS — H20.9 UVEITIC GLAUCOMA, BILATERAL, SEVERE STAGE: ICD-10-CM

## 2024-01-23 LAB
CHOLEST SERPL-MCNC: 235 MG/DL (ref 120–199)
CHOLEST/HDLC SERPL: 5.2 {RATIO} (ref 2–5)
HDLC SERPL-MCNC: 45 MG/DL (ref 40–75)
HDLC SERPL: 19.1 % (ref 20–50)
LDLC SERPL CALC-MCNC: 168.2 MG/DL (ref 63–159)
NONHDLC SERPL-MCNC: 190 MG/DL
TRIGL SERPL-MCNC: 109 MG/DL (ref 30–150)

## 2024-01-23 PROCEDURE — 36415 COLL VENOUS BLD VENIPUNCTURE: CPT | Mod: HCNC | Performed by: INTERNAL MEDICINE

## 2024-01-23 PROCEDURE — 80061 LIPID PANEL: CPT | Mod: HCNC | Performed by: INTERNAL MEDICINE

## 2024-01-24 RX ORDER — BRIMONIDINE TARTRATE AND TIMOLOL MALEATE 2; 5 MG/ML; MG/ML
1 SOLUTION OPHTHALMIC 3 TIMES DAILY
Qty: 15 ML | Refills: 3 | Status: CANCELLED | OUTPATIENT
Start: 2024-01-24

## 2024-01-24 RX ORDER — HYDROCHLOROTHIAZIDE 25 MG/1
25 TABLET ORAL DAILY
Qty: 90 TABLET | Refills: 3 | Status: SHIPPED | OUTPATIENT
Start: 2024-01-24

## 2024-01-24 RX ORDER — LATANOPROST 50 UG/ML
1 SOLUTION/ DROPS OPHTHALMIC NIGHTLY
Qty: 7.5 ML | Refills: 3 | Status: SHIPPED | OUTPATIENT
Start: 2024-01-24 | End: 2024-05-02

## 2024-01-24 RX ORDER — BRIMONIDINE TARTRATE, TIMOLOL MALEATE 2; 5 MG/ML; MG/ML
1 SOLUTION/ DROPS OPHTHALMIC 3 TIMES DAILY
Qty: 15 ML | Refills: 3 | Status: SHIPPED | OUTPATIENT
Start: 2024-01-24 | End: 2024-05-02

## 2024-01-24 RX ORDER — DORZOLAMIDE HCL 20 MG/ML
1 SOLUTION/ DROPS OPHTHALMIC 3 TIMES DAILY
Qty: 30 ML | Refills: 3 | Status: SHIPPED | OUTPATIENT
Start: 2024-01-24 | End: 2024-05-02

## 2024-01-24 RX ORDER — BRIMONIDINE TARTRATE AND TIMOLOL MALEATE 2; 5 MG/ML; MG/ML
1 SOLUTION OPHTHALMIC 3 TIMES DAILY
COMMUNITY
Start: 2024-01-24 | End: 2024-01-24 | Stop reason: SDUPTHER

## 2024-01-24 RX ORDER — ERGOCALCIFEROL 1.25 MG/1
CAPSULE ORAL
Qty: 12 CAPSULE | Refills: 8 | Status: SHIPPED | OUTPATIENT
Start: 2024-01-24

## 2024-01-24 NOTE — TELEPHONE ENCOUNTER
No care due was identified.  [facility] Embedded Care Due Messages. Reference number: [id number].   1/23/2024 11:11:10 PM CST

## 2024-02-12 DIAGNOSIS — E78.49 OTHER HYPERLIPIDEMIA: ICD-10-CM

## 2024-02-12 RX ORDER — ERGOCALCIFEROL 1.25 MG/1
CAPSULE ORAL
Qty: 13 CAPSULE | Refills: 0 | OUTPATIENT
Start: 2024-02-12

## 2024-02-12 RX ORDER — ROSUVASTATIN CALCIUM 10 MG/1
TABLET, COATED ORAL
Qty: 90 TABLET | Refills: 0 | OUTPATIENT
Start: 2024-02-12

## 2024-02-12 RX ORDER — AMLODIPINE BESYLATE 10 MG/1
TABLET ORAL
Qty: 90 TABLET | Refills: 0 | OUTPATIENT
Start: 2024-02-12

## 2024-02-12 RX ORDER — HYDROCHLOROTHIAZIDE 25 MG/1
TABLET ORAL
Qty: 90 TABLET | Refills: 0 | OUTPATIENT
Start: 2024-02-12

## 2024-02-12 NOTE — TELEPHONE ENCOUNTER
Refill Decision Note   Jaguar Lackey  is requesting a refill authorization.  Brief Assessment and Rationale for Refill:  Quick Discontinue     Medication Therapy Plan:    Pharmacy is requesting new scripts for the following medications without required information, (sig/ frequency/qty/etc)      Medication Reconciliation Completed: No     Comments: Pharmacies have been requesting medications for patients without required information, (sig, frequency, qty, etc.). In addition, requests are sent for medication(s) pt. are currently not taking, and medications patients have never taken.    We have spoken to the pharmacies about these request types and advised their teams previously that we are unable to assess these New Script requests and require all details for these requests. This is a known issue and has been reported.     Note composed:1:08 PM 02/12/2024

## 2024-02-12 NOTE — TELEPHONE ENCOUNTER
Care Due:                  Date            Visit Type   Department     Provider  --------------------------------------------------------------------------------                                EP -                              PRIMARY      [facility] INTERNAL  Last Visit: 09-      CARE ([facility])   MATT Desir                               -                              PRIMARY      [facility] INTERNAL  Next Visit: 03-      CARE ([facility])   MEDICINE       Carloz Desir                                                            Last  Test          Frequency    Reason                     Performed    Due Date  --------------------------------------------------------------------------------    Vitamin D...  12 months..  ergocalciferol...........  Not Found    Overdue    [employer] Embedded Care Due Messages. Reference number: [id number].   2/12/2024 10:18:17 AM CST

## 2024-02-17 RX ORDER — TAMSULOSIN HYDROCHLORIDE 0.4 MG/1
0.4 CAPSULE ORAL DAILY
Qty: 90 CAPSULE | Refills: 3 | Status: SHIPPED | OUTPATIENT
Start: 2024-02-17 | End: 2024-04-03 | Stop reason: SDUPTHER

## 2024-03-18 ENCOUNTER — LAB VISIT (OUTPATIENT)
Dept: LAB | Facility: HOSPITAL | Age: 64
End: 2024-03-18
Attending: INTERNAL MEDICINE
Payer: MEDICARE

## 2024-03-18 ENCOUNTER — OFFICE VISIT (OUTPATIENT)
Dept: INTERNAL MEDICINE | Facility: CLINIC | Age: 64
End: 2024-03-18
Payer: MEDICARE

## 2024-03-18 VITALS
SYSTOLIC BLOOD PRESSURE: 130 MMHG | BODY MASS INDEX: 27.07 KG/M2 | HEART RATE: 50 BPM | DIASTOLIC BLOOD PRESSURE: 78 MMHG | OXYGEN SATURATION: 95 % | WEIGHT: 182.75 LBS | HEIGHT: 69 IN

## 2024-03-18 DIAGNOSIS — C61 PROSTATE CANCER: ICD-10-CM

## 2024-03-18 DIAGNOSIS — R93.89 ABNORMAL CT OF THE CHEST: Primary | ICD-10-CM

## 2024-03-18 DIAGNOSIS — I10 HYPERTENSION, UNSPECIFIED TYPE: ICD-10-CM

## 2024-03-18 DIAGNOSIS — H54.8 LEGAL BLINDNESS: ICD-10-CM

## 2024-03-18 DIAGNOSIS — D86.0 PULMONARY SARCOIDOSIS: ICD-10-CM

## 2024-03-18 DIAGNOSIS — E78.5 HYPERLIPIDEMIA, UNSPECIFIED HYPERLIPIDEMIA TYPE: ICD-10-CM

## 2024-03-18 DIAGNOSIS — R73.03 PREDIABETES: ICD-10-CM

## 2024-03-18 DIAGNOSIS — D86.0 SARCOIDOSIS OF LUNG: ICD-10-CM

## 2024-03-18 DIAGNOSIS — Z12.11 COLON CANCER SCREENING: ICD-10-CM

## 2024-03-18 LAB
ALBUMIN SERPL BCP-MCNC: 4.2 G/DL (ref 3.5–5.2)
ALP SERPL-CCNC: 69 U/L (ref 55–135)
ALT SERPL W/O P-5'-P-CCNC: 26 U/L (ref 10–44)
ANION GAP SERPL CALC-SCNC: 12 MMOL/L (ref 8–16)
AST SERPL-CCNC: 19 U/L (ref 10–40)
BILIRUB SERPL-MCNC: 0.7 MG/DL (ref 0.1–1)
BUN SERPL-MCNC: 16 MG/DL (ref 8–23)
CALCIUM SERPL-MCNC: 10.5 MG/DL (ref 8.7–10.5)
CHLORIDE SERPL-SCNC: 103 MMOL/L (ref 95–110)
CO2 SERPL-SCNC: 29 MMOL/L (ref 23–29)
COMPLEXED PSA SERPL-MCNC: 5.7 NG/ML (ref 0–4)
CREAT SERPL-MCNC: 0.9 MG/DL (ref 0.5–1.4)
EST. GFR  (NO RACE VARIABLE): >60 ML/MIN/1.73 M^2
ESTIMATED AVG GLUCOSE: 123 MG/DL (ref 68–131)
GLUCOSE SERPL-MCNC: 97 MG/DL (ref 70–110)
HBA1C MFR BLD: 5.9 % (ref 4–5.6)
POTASSIUM SERPL-SCNC: 4.3 MMOL/L (ref 3.5–5.1)
PROT SERPL-MCNC: 7.9 G/DL (ref 6–8.4)
SODIUM SERPL-SCNC: 144 MMOL/L (ref 136–145)

## 2024-03-18 PROCEDURE — 3008F BODY MASS INDEX DOCD: CPT | Mod: HCNC,CPTII,S$GLB, | Performed by: INTERNAL MEDICINE

## 2024-03-18 PROCEDURE — 99214 OFFICE O/P EST MOD 30 MIN: CPT | Mod: HCNC,S$GLB,, | Performed by: INTERNAL MEDICINE

## 2024-03-18 PROCEDURE — G2211 COMPLEX E/M VISIT ADD ON: HCPCS | Mod: HCNC,S$GLB,, | Performed by: INTERNAL MEDICINE

## 2024-03-18 PROCEDURE — 3078F DIAST BP <80 MM HG: CPT | Mod: HCNC,CPTII,S$GLB, | Performed by: INTERNAL MEDICINE

## 2024-03-18 PROCEDURE — 84153 ASSAY OF PSA TOTAL: CPT | Mod: HCNC | Performed by: INTERNAL MEDICINE

## 2024-03-18 PROCEDURE — 83036 HEMOGLOBIN GLYCOSYLATED A1C: CPT | Mod: HCNC | Performed by: INTERNAL MEDICINE

## 2024-03-18 PROCEDURE — 80053 COMPREHEN METABOLIC PANEL: CPT | Mod: HCNC | Performed by: INTERNAL MEDICINE

## 2024-03-18 PROCEDURE — 99999 PR PBB SHADOW E&M-EST. PATIENT-LVL III: CPT | Mod: PBBFAC,HCNC,, | Performed by: INTERNAL MEDICINE

## 2024-03-18 PROCEDURE — 3075F SYST BP GE 130 - 139MM HG: CPT | Mod: HCNC,CPTII,S$GLB, | Performed by: INTERNAL MEDICINE

## 2024-03-18 PROCEDURE — 1159F MED LIST DOCD IN RCRD: CPT | Mod: HCNC,CPTII,S$GLB, | Performed by: INTERNAL MEDICINE

## 2024-03-18 PROCEDURE — 36415 COLL VENOUS BLD VENIPUNCTURE: CPT | Mod: HCNC | Performed by: INTERNAL MEDICINE

## 2024-03-18 RX ORDER — AMLODIPINE BESYLATE 10 MG/1
10 TABLET ORAL DAILY
Qty: 90 TABLET | Refills: 2 | Status: SHIPPED | OUTPATIENT
Start: 2024-03-18

## 2024-03-18 NOTE — PROGRESS NOTES
Mr. Lackey is a 64 -year-old gentleman coming   in today to follow up on his ongoing medical problems and to discuss holistic approach to improve his health.   He was last seen by me  in 9/18/2023. His medical problems include:          1. Hypertension. He is on Norvasc 10 mg a day. He has had hypertension   for several years.  bp 130/78.    He is not checking his bp at home. Has signed up  with digital hypertension program,  should be getting device soon .no med changes. He denies any chest pain, short-windedness, palpitations, nausea or vomiting.        2. He has hyperlipidemia. He is on rousovastatin  increased to 20 mg in Feb of this year-- He was on  pravastatin 40 mg a day. Last lipid panel was 1/23/2024, LDL was 168!.       3. He has vitamin D deficiency with history of sarcoidosis. We put him on   vitamin D. He is on a 2 x monthly vitamin D. In Nov 2019  his Vit was 33-  he continues on his vitamin-D.- and he is taking one a week-.        4. Sarcoidosis. .He was 1st diagnosed with sarcoid in the 1990sMost prominent sarcoid issue is uveitis glaucoma.  He is seeing opthomology and see Dr Davila in Rheumatology-- last seen 2/2022.  He was on MTX for a while, but is off this now.  He denies any respiratory symptoms including wheezing or exercise intolerance. Has not used inhaler in a year. Had some fibrosis on chest ct of          5. 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          6. Prostate cancer -- dx in 3/2020--   low risk prostate CA,  continued active surveillance.  He saw Urology last 9/22/2023.  Note reviewed.  Recommend active surveillance.  Due back to see Urology this month with psa.  I dont see this scheduled--      7. Prediabetes-  6.2 in Sept 2023 -- diet stable                  ROS : Gen - no fatigue or significant weight change  Eyes -  "no eye pain.  ENT - no hoarseness or sore throat  CV - No chest pain NO palpitations.   Pulm - no cough, wheezing or dyspnea of exertion-- otherwise as above.   GI - no N/V/D   no dysuria or incontinence  MS - no joint pain or muscle pain  Skin - no rash, or c/o of skin lesions  Neuro - no HA, dizziness--- memory is doing well.   Heme - no abnormal bleeding or bruising  Endo - no polydipsia, or temperature changes  Psych - no anxiety or depression             /78 (BP Location: Left arm, Patient Position: Sitting, BP Method: Medium (Manual))   Pulse (!) 50   Ht 5' 9" (1.753 m)   Wt 82.9 kg (182 lb 12.2 oz)   SpO2 95%   BMI 26.99 kg/m²           GENERAL: He is a well-appearing 64 --year-old gentleman in no acute   distress.  Poor vision- Legally blind.  Here by himself today    Ear examination was unremarkable.   Eyes- sclera is injected but normal for him- no ptosis.   NECK: Supple. He has no JVD. Thyroid is not enlarged. Chest- CTA bilaterally.   CARDIOVASCULAR: S1, S2. Regular rate and rhythm.   LUNGS: Clear bilaterally- no wheeze.   ABDOMEN: Soft, nontender, no hepatosplenomegaly, no guarding, rebound or   tenderness. Slight distended.   LOWER EXTREMITIES: No edema.  NO hair on legs.       ASSESSMENT:      1. Hypertension, -continue the hctz 25 mg a day and  Amlodipine  10 mg a day   2. Hyperlipidemia Continue the rousovastatin   3. Sarcoidosis, Reviewed notes from Rheum,--follow up  with Rheum.  Has lung disease but no sob- I will get him back to Rheumatology   4. Prediabetes- discussed diet-  will check labs    5. . Prostate Cancer-- He last saw Urology in 3/2023---- is doing active surveillance. FU with urology- will get PSA -- will make referral so we can get him seen soon-  Due back      Will follow up in 6 months.      Our office providers are the focal point for all needed health care services that are part of ongoing care related to a patient's single serious condition or the patient's complex " conditions.

## 2024-04-03 ENCOUNTER — OFFICE VISIT (OUTPATIENT)
Dept: UROLOGY | Facility: CLINIC | Age: 64
End: 2024-04-03
Payer: MEDICARE

## 2024-04-03 VITALS
SYSTOLIC BLOOD PRESSURE: 137 MMHG | BODY MASS INDEX: 26.12 KG/M2 | HEIGHT: 69 IN | HEART RATE: 54 BPM | DIASTOLIC BLOOD PRESSURE: 74 MMHG | WEIGHT: 176.38 LBS

## 2024-04-03 DIAGNOSIS — N13.8 BENIGN PROSTATIC HYPERPLASIA WITH URINARY OBSTRUCTION: Primary | ICD-10-CM

## 2024-04-03 DIAGNOSIS — C61 PROSTATE CANCER: ICD-10-CM

## 2024-04-03 DIAGNOSIS — N40.1 BENIGN PROSTATIC HYPERPLASIA WITH URINARY OBSTRUCTION: Primary | ICD-10-CM

## 2024-04-03 PROCEDURE — 99999 PR PBB SHADOW E&M-EST. PATIENT-LVL III: CPT | Mod: PBBFAC,HCNC,,

## 2024-04-03 PROCEDURE — 1159F MED LIST DOCD IN RCRD: CPT | Mod: HCNC,CPTII,S$GLB,

## 2024-04-03 PROCEDURE — 3044F HG A1C LEVEL LT 7.0%: CPT | Mod: HCNC,CPTII,S$GLB,

## 2024-04-03 PROCEDURE — 3075F SYST BP GE 130 - 139MM HG: CPT | Mod: HCNC,CPTII,S$GLB,

## 2024-04-03 PROCEDURE — 99214 OFFICE O/P EST MOD 30 MIN: CPT | Mod: HCNC,S$GLB,,

## 2024-04-03 PROCEDURE — 3078F DIAST BP <80 MM HG: CPT | Mod: HCNC,CPTII,S$GLB,

## 2024-04-03 PROCEDURE — 3008F BODY MASS INDEX DOCD: CPT | Mod: HCNC,CPTII,S$GLB,

## 2024-04-03 PROCEDURE — 1160F RVW MEDS BY RX/DR IN RCRD: CPT | Mod: HCNC,CPTII,S$GLB,

## 2024-04-03 RX ORDER — TAMSULOSIN HYDROCHLORIDE 0.4 MG/1
0.4 CAPSULE ORAL DAILY
Qty: 90 CAPSULE | Refills: 3 | Status: SHIPPED | OUTPATIENT
Start: 2024-04-03

## 2024-04-03 NOTE — PROGRESS NOTES
CHIEF COMPLAINT:  Prostate cancer       HISTORY OF PRESENTING ILLINESS:  Patient is a 64 y.o. male who is established to our clinic who presents for prostate cancer/active surveillance. He is a new patient to me personally - chart reviewed. His last visit was 9/22/2023 with KENNA Alicea NP. PSA 3/18/24 5.7 ng/ml, PSA was 6.2 ng/ml 9/18/23. Sister had bone cancer.     He underwent a TRUS biopsy on 1/29/20---no post-biopsy problems.    Showed Baldomero 3+3 prostate cancer as shown below.      TRUS Volume: 52cc    PATHOLOGY:  Final Pathologic Diagnosis 1.  Prostate, left apex (biopsy):   Benign prostatic glands and stroma     2.  Prostate, left middle (biopsy):   Benign prostatic glands and stroma     3.  Prostate, left base (biopsy):   Benign seminal vesicles and prostatic glands and stroma     4.  Prostate, right apex (biopsy):   Atypical small acinar proliferation     5.  Prostate, right middle (biopsy):   Benign prostatic glands and stroma     6.  Prostate, right base (biopsy):   Prostatic adenocarcinoma, Peak score 3+ 3 = 6   Tumor involves 1 of 2 tissue cores   Tumor comprises less than 5% of submitted tissue       PSA remains stable. Will continue active surveillance with PSA every 6 months.     Patient reports having urinary frequency and weaker stream.  Currently prescribed silodosin 4 mg, which he is taking.  Discussed bladder irritants and their avoidance.  Continues to have worsening LUTs, will switch silodosin to tamsulosin. States that has had decrease in nocturia with Flomax, 1 now. Before was getting up at 0100, again 0400. Goes to sleep around 2130.  FOS is a little better. Continues with urinary frequency.      No problems with erections at this time.      REVIEW OF SYSTEMS:  Review of Systems   Constitutional:  Negative for chills and fever.   HENT:  Negative for congestion and sore throat.    Eyes:         Bilateral blindness   Respiratory:  Negative for cough and shortness of breath.     Cardiovascular:  Negative for chest pain and palpitations.   Gastrointestinal:  Negative for nausea and vomiting.   Genitourinary:  Negative for dysuria, flank pain, frequency, hematuria and urgency.   Neurological:  Negative for dizziness and headaches.         PATIENT HISTORY:    Past Medical History:   Diagnosis Date    Dry eyes     Glaucoma     Hyperlipidemia     Hypertension     Myalgia and myositis 2/16/2016    Prostate cancer 1/15/2021    Sarcoidosis of lung     Uveitis        Past Surgical History:   Procedure Laterality Date    BAERVELDT GLAUCOMA IMPLANT AND CATARACT REMOVAL Right 9/24/14    OD ()    Bleb Revision Right 3/12    OD with Dr. Pratt    CATARACT EXTRACTION W/  INTRAOCULAR LENS IMPLANT Left n/a    Dr. Menard    CATARACT EXTRACTION W/  INTRAOCULAR LENS IMPLANT Right 09/24/2014    WITH ELIAN ()    FRACTURE SURGERY      right foot    Needling Bleb Revision   3/12    OD Dr. Pratt    PARS PLANA VITRECTOMY W/ ENDOPHOTOCOAGULATION Left 2005    TRABECULECTOMY Right 2010    OD Dr. Menard       Family History   Problem Relation Age of Onset    Heart disease Father     No Known Problems Mother     No Known Problems Brother     Blindness Maternal Uncle     Glaucoma Paternal Aunt     No Known Problems Sister     No Known Problems Maternal Aunt     No Known Problems Paternal Uncle     No Known Problems Maternal Grandmother     No Known Problems Maternal Grandfather     No Known Problems Paternal Grandmother     No Known Problems Paternal Grandfather     No Known Problems Daughter     No Known Problems Son     No Known Problems Sister     Hypertension Sister     No Known Problems Brother     No Known Problems Brother     Hypertension Brother     Cancer Neg Hx     Macular degeneration Neg Hx     Retinal detachment Neg Hx     Rheum arthritis Neg Hx     Lupus Neg Hx     Inflammatory bowel disease Neg Hx     Psoriasis Neg Hx     Amblyopia Neg Hx     Cataracts Neg Hx      Strabismus Neg Hx     Stroke Neg Hx     Thyroid disease Neg Hx        Social History     Socioeconomic History    Marital status:      Spouse name: Riccardo    Number of children: 2   Occupational History     Employer: [employer]   Tobacco Use    Smoking status: Never     Passive exposure: Past (cigarettes w father bein a smoker, & prior occupation in HVAC -inhalation of chemicals)    Smokeless tobacco: Never    Tobacco comments:     disability since 2013;    Substance and Sexual Activity    Alcohol use: Yes     Comment: Moderate    Drug use: No    Sexual activity: Yes     Partners: Female     Social Determinants of Health     Financial Resource Strain: Medium Risk (7/31/2023)    Overall Financial Resource Strain (CARDIA)     Difficulty of Paying Living Expenses: Somewhat hard   Food Insecurity: No Food Insecurity (7/31/2023)    Hunger Vital Sign     Worried About Running Out of Food in the Last Year: Never true     Ran Out of Food in the Last Year: Never true   Transportation Needs: No Transportation Needs (7/31/2023)    PRAPARE - Transportation     Lack of Transportation (Medical): No     Lack of Transportation (Non-Medical): No   Physical Activity: Sufficiently Active (11/5/2021)    Exercise Vital Sign     Days of Exercise per Week: 4 days     Minutes of Exercise per Session: 60 min   Stress: No Stress Concern Present (7/31/2023)    [employer] - Occupational Stress Questionnaire     Feeling of Stress : Not at all   Social Connections: Unknown (7/31/2023)    Social Connection and Isolation Panel [NHANES]     Marital Status:    Housing Stability: Unknown (7/31/2023)    Housing Stability Vital Sign     Unable to Pay for Housing in the Last Year: No     Unstable Housing in the Last Year: No       Allergies:  Patient has no known allergies.    Medications:    Current Outpatient Medications:     amLODIPine (NORVASC) 10 MG tablet, Take 1 tablet (10 mg total) by  mouth once daily., Disp: 90 tablet, Rfl: 2    COMBIGAN 0.2-0.5 % Drop, Place 1 drop into both eyes 3 (three) times daily., Disp: 15 mL, Rfl: 3    dorzolamide (TRUSOPT) 2 % ophthalmic solution, Place 1 drop into both eyes 3 (three) times daily., Disp: 30 mL, Rfl: 3    ergocalciferol (ERGOCALCIFEROL) 50,000 unit Cap, TAKE 1 CAPSULE BY MOUTH ONE TIME PER WEEK, Disp: 12 capsule, Rfl: 8    hydroCHLOROthiazide (HYDRODIURIL) 25 MG tablet, Take 1 tablet (25 mg total) by mouth once daily., Disp: 90 tablet, Rfl: 3    latanoprost 0.005 % ophthalmic solution, Place 1 drop into the left eye every evening., Disp: 7.5 mL, Rfl: 3    multivitamin capsule, Take 1 capsule by mouth once daily., Disp: , Rfl:     rosuvastatin (CRESTOR) 10 MG tablet, Take 2 tablets (20 mg total) by mouth once daily., Disp: 180 tablet, Rfl: 3    tamsulosin (FLOMAX) 0.4 mg Cap, Take 1 capsule (0.4 mg total) by mouth once daily., Disp: 90 capsule, Rfl: 3    PHYSICAL EXAMINATION:  Physical Exam  Constitutional:       Appearance: Normal appearance.   HENT:      Head: Normocephalic and atraumatic.      Right Ear: External ear normal.      Left Ear: External ear normal.      Nose: Nose normal.      Mouth/Throat:      Mouth: Mucous membranes are moist.   Pulmonary:      Effort: Pulmonary effort is normal. No respiratory distress.   Skin:     General: Skin is warm and dry.   Neurological:      General: No focal deficit present.      Mental Status: He is alert and oriented to person, place, and time.   Psychiatric:         Mood and Affect: Mood normal.         Behavior: Behavior normal.           Lab Results   Component Value Date    PSA 5.8 (H) 11/25/2019    PSA 3.6 09/10/2018    PSA 2.4 02/16/2016    PSADIAG 5.7 (H) 03/18/2024    PSADIAG 6.2 (H) 09/18/2023    PSADIAG 5.8 (H) 03/16/2023       Lab Results   Component Value Date    CREATININE 0.9 03/18/2024    EGFRNORACEVR >60.0 03/18/2024           IMPRESSION:  Encounter Diagnoses   Name Primary?    Benign  prostatic hyperplasia with urinary obstruction Yes    Prostate cancer          Assessment:       1. Benign prostatic hyperplasia with urinary obstruction    2. Prostate cancer        Plan:   1. Prostate cancer:  - currently on active surveillance.   - PSA today is stable.   -given very low risk prostate CA, recommended continued active surveillance q 6 months. Previously order MRI not scheduled due to insurance conflict. Will plan MRI if PSA rises      2. BPH:  - continue tamsulosin 0.4 mg nightly  - avoid bladder irritants. Drinks 1/2 cup coffee every other day. Eats a lot of chocolate.      3. Rtc in 6 mths with psa prior     I spent 30 minutes with the patient. Over 50% of the visit was spent in counseling.

## 2024-04-04 ENCOUNTER — TELEPHONE (OUTPATIENT)
Dept: OPHTHALMOLOGY | Facility: CLINIC | Age: 64
End: 2024-04-04
Payer: MEDICARE

## 2024-04-04 NOTE — TELEPHONE ENCOUNTER
----- Message from Angelina Elizabeth MA sent at 4/3/2024  1:07 PM CDT -----  Regarding: FW: PT dropping off form he needs filled  Contact: [phone]    ----- Message -----  From: Radha Padron  Sent: 4/3/2024   9:07 AM CDT  To: Santa Méndez Staff  Subject: PT dropping off form he needs filled             PT dropping off form today he needs filled out    Patient can be contacted @# [phone]

## 2024-04-04 NOTE — TELEPHONE ENCOUNTER
Pt needs updated forms for him to continue [facility] para transit program. We were able to get the forms online and filled out physician's verification. Pt advised and form mailed to pt.

## 2024-04-10 ENCOUNTER — HOSPITAL ENCOUNTER (OUTPATIENT)
Dept: RADIOLOGY | Facility: HOSPITAL | Age: 64
Discharge: HOME | End: 2024-04-10
Attending: INTERNAL MEDICINE
Payer: MEDICARE

## 2024-04-10 ENCOUNTER — OFFICE VISIT (OUTPATIENT)
Dept: RHEUMATOLOGY | Facility: CLINIC | Age: 64
End: 2024-04-10
Payer: MEDICARE

## 2024-04-10 VITALS
BODY MASS INDEX: 26.96 KG/M2 | SYSTOLIC BLOOD PRESSURE: 131 MMHG | DIASTOLIC BLOOD PRESSURE: 70 MMHG | WEIGHT: 182.56 LBS | HEART RATE: 73 BPM

## 2024-04-10 DIAGNOSIS — D86.0 SARCOIDOSIS OF LUNG: ICD-10-CM

## 2024-04-10 DIAGNOSIS — D86.9 SARCOID: ICD-10-CM

## 2024-04-10 DIAGNOSIS — D86.9 SARCOID: Primary | ICD-10-CM

## 2024-04-10 DIAGNOSIS — D86.83 IRIDOCYCLITIS DUE TO SARCOIDOSIS: ICD-10-CM

## 2024-04-10 DIAGNOSIS — H54.8 LEGAL BLINDNESS: ICD-10-CM

## 2024-04-10 PROCEDURE — 1159F MED LIST DOCD IN RCRD: CPT | Mod: HCNC,CPTII,S$GLB, | Performed by: INTERNAL MEDICINE

## 2024-04-10 PROCEDURE — 3044F HG A1C LEVEL LT 7.0%: CPT | Mod: HCNC,CPTII,S$GLB, | Performed by: INTERNAL MEDICINE

## 2024-04-10 PROCEDURE — 71250 CT THORAX DX C-: CPT | Mod: TC,HCNC

## 2024-04-10 PROCEDURE — 71250 CT THORAX DX C-: CPT | Mod: 26,HCNC,, | Performed by: RADIOLOGY

## 2024-04-10 PROCEDURE — 99214 OFFICE O/P EST MOD 30 MIN: CPT | Mod: HCNC,S$GLB,, | Performed by: INTERNAL MEDICINE

## 2024-04-10 PROCEDURE — 3008F BODY MASS INDEX DOCD: CPT | Mod: HCNC,CPTII,S$GLB, | Performed by: INTERNAL MEDICINE

## 2024-04-10 PROCEDURE — 1160F RVW MEDS BY RX/DR IN RCRD: CPT | Mod: HCNC,CPTII,S$GLB, | Performed by: INTERNAL MEDICINE

## 2024-04-10 PROCEDURE — 3078F DIAST BP <80 MM HG: CPT | Mod: HCNC,CPTII,S$GLB, | Performed by: INTERNAL MEDICINE

## 2024-04-10 PROCEDURE — 99999 PR PBB SHADOW E&M-EST. PATIENT-LVL III: CPT | Mod: PBBFAC,HCNC,, | Performed by: INTERNAL MEDICINE

## 2024-04-10 PROCEDURE — 3075F SYST BP GE 130 - 139MM HG: CPT | Mod: HCNC,CPTII,S$GLB, | Performed by: INTERNAL MEDICINE

## 2024-04-11 NOTE — PROGRESS NOTES
History of present illness:  64-year-old gentleman with sarcoidosis involving the eye and lung.  He previously followed with Dr. Lyons.  He had not been seen for 2 years.  He was told he needs a rheumatology appointment and the appointment was made with myself.  This was 2 years ago.  He was previously treated with prednisone and methotrexate but has been on no treatment recently.     He was first diagnosed with sarcoid in the 1990s.  He presented at that time with uveitis.  He had just been evaluated by Ophthalmology and has had no active uveitis.  He has had no recent eye redness or pain.  He does have foggy vision and is legally blind.  He is on medication for secondary glaucoma but not for active inflammation.  I have not seen him since his initial visit and he has not seen Ophthalmology for 2 years as well.    His breathing has been stable.  He has had no cough.  He does get dyspnea after 2 flights.  He is able to do his routine activities.  He has had no eye pain or redness.  His vision is getting worse.  He has had no fever, headache, rash, oral ulcers.  He was having abdominal pain.  CT scan only showed constipation.  He has had some pain in the right elbow and right knee.  He has had no joint swelling.  His elbow has been for the past 2 months.  The knee pain just began yesterday.  His weight has been stable and he is had no other recent medical problems.  His prostate cancer has not progressed.    Physical examination:  Skin:  No rashes   ENT:  No eye redness.  No oral ulcers.  Adequate tears in saliva.    Chest:  Clear to auscultation  Cardiac:  No murmurs, gallops, rubs   Abdomen:  No organomegaly or masses.  No tenderness to palpation   Extremities:  No clubbing, cyanosis, edema.  Musculoskeletal:  Cervical spine and shoulders are unremarkable.    He has tenderness of the right lateral epicondyle.  He has pain on range of motion of the elbow.  Has no swelling.  Left elbow is unremarkable.    Wrists and  small joints of the hands are unremarkable.    Lumbar spine has good range of motion without pain on range of motion.    Hips, knees, ankles, small joints the feet are unremarkable.    Assessment:  Clinically he has no evidence of active sarcoidosis   2. His elbow pain is lateral epicondylitis     Plans:  1.  Laboratory studies obtained  2. I have ordered a CT scan of the chest   3. He does need to follow-up with ophthalmology   4.  Return in 12 months

## 2024-04-16 LAB — NONINV COLON CA DNA+OCC BLD SCRN STL QL: NEGATIVE

## 2024-04-17 ENCOUNTER — TELEPHONE (OUTPATIENT)
Dept: OPHTHALMOLOGY | Facility: CLINIC | Age: 64
End: 2024-04-17
Payer: MEDICARE

## 2024-04-17 NOTE — TELEPHONE ENCOUNTER
----- Message from Honey Pratt MD sent at 4/16/2024  6:16 PM CDT -----  Regarding: past due for appt with Santa please try and schedule one  His rheumatologist - has asked that we get this pt an appointment - he is past due   KL  ----- Message -----  From: Sonny Davila MD  Sent: 4/11/2024  10:33 AM CDT  To: Carloz Desir Jr., MD; #

## 2024-04-22 ENCOUNTER — TELEPHONE (OUTPATIENT)
Dept: OPHTHALMOLOGY | Facility: CLINIC | Age: 64
End: 2024-04-22
Payer: MEDICARE

## 2024-04-22 NOTE — TELEPHONE ENCOUNTER
----- Message from Sonja Pepe sent at 4/22/2024 11:20 AM CDT -----  Pt calling to schedule  appt , didn't see any appt  available , please call       Confirmed patient's contact info below:  Contact Name: Jaguar Lackey  Phone Number: [phone]

## 2024-04-30 DIAGNOSIS — H20.9 UVEITIC GLAUCOMA, BILATERAL, SEVERE STAGE: ICD-10-CM

## 2024-04-30 DIAGNOSIS — H40.43X3 UVEITIC GLAUCOMA, BILATERAL, SEVERE STAGE: ICD-10-CM

## 2024-05-02 RX ORDER — LATANOPROST 50 UG/ML
1 SOLUTION/ DROPS OPHTHALMIC NIGHTLY
Qty: 7.5 ML | Refills: 3 | Status: SHIPPED | OUTPATIENT
Start: 2024-05-02

## 2024-05-02 RX ORDER — DORZOLAMIDE HCL 20 MG/ML
1 SOLUTION/ DROPS OPHTHALMIC 3 TIMES DAILY
Qty: 30 ML | Refills: 3 | Status: SHIPPED | OUTPATIENT
Start: 2024-05-02

## 2024-05-02 RX ORDER — BRIMONIDINE TARTRATE, TIMOLOL MALEATE 2; 5 MG/ML; MG/ML
1 SOLUTION/ DROPS OPHTHALMIC 3 TIMES DAILY
Qty: 15 ML | Refills: 3 | Status: SHIPPED | OUTPATIENT
Start: 2024-05-02

## 2024-05-04 NOTE — PROGRESS NOTES
HPI    DLS: 8/11/2022    Pt here for Overdue Glaucoma Check;  Pt states no eye pain and vision is starting to decrease more harder to   make things out now.     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            Assessment /Plan     For exam results, see Encounter Report.    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    Postinflammatory optic atrophy, left    Band keratopathy, left    Steroid responders borderline glaucoma, bilateral    Pas (periph anterior synechiae), bilateral    Legal blindness - Both Eyes    Pseudophakia of both eyes    S/P YAG capsulotomy, right        IOP was up to 64 OD off all meds  (7/15/2014)  Was still too high back on maximum meds -- had baerveldt OD - 9/2014   Lost to F/U 8/11/2022 to 5/9/2024 ( about 1.75 years) - still using gtts   Pt did a 9 month boarding training course - with [facility] - affiliated blind [facility] - very helpful   Works at [employer] in [place]     1. Uveitic glaucoma - Both Eyes OD>OS  2/2 sarcoidosis  Old pt of Dr. Wagner and Dr. Menard  First HVF 2012  First photos 2011     Family history   neg  Glaucoma meds   Off all drops and diamox 2013 to 2014  (pt lost insurance)(( had +++ progression off gtts)                              back on gtts combigan/azopt OU and latanoprost OS  H/O adverse rxn to glaucoma drops  None  LASERS   yag cap od 4/4/2019 - did not help   GLAUCOMA SURGERIES   Trab OD (3/20/ 2010 -Derian), Bleb Revision OD  3/21/2012 Loft// complex phaco/Baerveldt od 9/24/2014  OTHER EYE SURGERIES   PPV/Endo O5, CE OS (04)  CDR  0.95/0.5  Tbase  ?    Tmax   "36/22 on meds      Ttarget   16/20       HVF  10-2 stim III OD (3/5/12)-sup arc,inf alt, tiny residual island OD; 24-2 OS w/ marked general depression             3/4/2013 ---HVF 10-2 stim III OD - SAD / Inf Alt los s            OD - 7 test 10-2 stim V - 2014 to 2020  - Ext - + prog (NO MORE TESTING OD)             OS - 7 test [id number]   24-2 - stim V OS  // Inf Alt loss/SAD  - stable            PT DECLINES FURTHER VF TESTING     Gonio -  PAS OD - 360 // OS -  f    CCT  [phone]  OCT  7 test 2011 to 2021   - dec. S/I/N od // Dec S/N/I   HRT  8 test 2012 to 2019  - MR -  Dec. S/T, bord I/N od // xx os /// CDR 9.78 od // xx os  Disc photos  2011, 2013, 2014, 2015, 2018, 2020   - OIS     Qoylxs20/13   ((down from 23/16 - now off steroid gtts od ))  Test done today IOP // Iritis check      2. Secondary iritis - Both Eyes      3. 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 Postinflammatory optic atrophy - Left Eye   -Sarcoid, + APD, uncertain etiology     5. Relative afferent pupillary defect OS     6. Steroid responders borderline glaucoma - Both Eyes -increased IOP w/ Pred Acetate. Does better w/ Vexol     7. Legal blindness - Both EyeS OD based on VF loss and OS based on [facility].     8. Band keratopathy os   - chelation with [facility] 9/24/2021     9 Pseudophakic OD  S/P complex phaco/IOL / baerveldt od 9/24/2014   PC IOL os - done years ago - ? [facility] - ? IOL sewn in        Plan:  Uveitic glaucoma  Angle closure - PAS ou   Lost to F/U for 10 months from 9/2013 to 7/2014  - VA od went  from 20/40 to LP  Was already CF at 3 ft OS- 2/2 ON pallor from sarcoid  IOP OD was 64 - off drops since 9/2013 - when re-presented     Pt is "legally blind" - he may want to apply for disability.    Pt previously had issues with insurance, but now has new insurance.  Discussed with patient that the vision loss od is irreversible 2/2 glaucoma and the importance of compliance with gtt/meds.      Developed  anterior chamber " fibrin OD  Post GDD  - it has has resolved - but prone to iritis with any intervention     Pt has seen the pulmonologist  Has seen an internist   Has  seen rheumatology     Was  on MTX for sarcoid and iritis - off - stopped on his own - seems to be doing ok (since 5/30/2018)  No increase in inflammationn od on 7/30/2018 // still ok off steroid gtts 11/12/2018    - Cont combigan tid OU   - cont  dorzolamide OU -  Tid   - cont latanoprost - - OS only     CSM - glaucoma gtts   Stay off steroid gtts for now      yag cap od - 4/4/2019 - did NOT improve the vision     Dry eyes   Gel gtts q hs os and ointment q hs os     Photo file updated 12/16/2021    F/U 4 months with  IOP check and  Iritis check// declines further VF testing // DFE // photos

## 2024-05-09 ENCOUNTER — OFFICE VISIT (OUTPATIENT)
Dept: OPHTHALMOLOGY | Facility: CLINIC | Age: 64
End: 2024-05-09
Payer: MEDICARE

## 2024-05-09 DIAGNOSIS — H21.523 PAS (PERIPH ANTERIOR SYNECHIAE), BILATERAL: ICD-10-CM

## 2024-05-09 DIAGNOSIS — H47.292 POSTINFLAMMATORY OPTIC ATROPHY, LEFT: ICD-10-CM

## 2024-05-09 DIAGNOSIS — H54.8 LEGAL BLINDNESS: ICD-10-CM

## 2024-05-09 DIAGNOSIS — H40.43X3 UVEITIC GLAUCOMA, BILATERAL, SEVERE STAGE: Primary | ICD-10-CM

## 2024-05-09 DIAGNOSIS — Z98.890 S/P YAG CAPSULOTOMY, RIGHT: ICD-10-CM

## 2024-05-09 DIAGNOSIS — H20.9 UVEITIC GLAUCOMA, BILATERAL, SEVERE STAGE: Primary | ICD-10-CM

## 2024-05-09 DIAGNOSIS — H40.043 STEROID RESPONDERS BORDERLINE GLAUCOMA, BILATERAL: ICD-10-CM

## 2024-05-09 DIAGNOSIS — H18.422 BAND KERATOPATHY, LEFT: ICD-10-CM

## 2024-05-09 DIAGNOSIS — Z96.1 PSEUDOPHAKIA OF BOTH EYES: ICD-10-CM

## 2024-05-09 DIAGNOSIS — D86.83 IRIDOCYCLITIS DUE TO SARCOIDOSIS: ICD-10-CM

## 2024-05-09 PROCEDURE — 1159F MED LIST DOCD IN RCRD: CPT | Mod: HCNC,CPTII,S$GLB, | Performed by: OPHTHALMOLOGY

## 2024-05-09 PROCEDURE — 3044F HG A1C LEVEL LT 7.0%: CPT | Mod: HCNC,CPTII,S$GLB, | Performed by: OPHTHALMOLOGY

## 2024-05-09 PROCEDURE — 99999 PR PBB SHADOW E&M-EST. PATIENT-LVL II: CPT | Mod: PBBFAC,HCNC,, | Performed by: OPHTHALMOLOGY

## 2024-05-09 PROCEDURE — 99214 OFFICE O/P EST MOD 30 MIN: CPT | Mod: HCNC,S$GLB,, | Performed by: OPHTHALMOLOGY

## 2024-05-09 PROCEDURE — 1160F RVW MEDS BY RX/DR IN RCRD: CPT | Mod: HCNC,CPTII,S$GLB, | Performed by: OPHTHALMOLOGY

## 2024-06-06 ENCOUNTER — PATIENT OUTREACH (OUTPATIENT)
Dept: ADMINISTRATIVE | Facility: HOSPITAL | Age: 64
End: 2024-06-06
Payer: MEDICARE

## 2024-06-06 NOTE — PROGRESS NOTES
Health Maintenance Due   Topic Date Due    RSV Vaccine (Age 60+ and Pregnant patients) (1 - 1-dose 60+ series) Never done     Triggered LINKS and reconciled immunizations. Updated Care Everywhere. Chart review completed as part of April MA Gap List report.

## 2024-06-10 ENCOUNTER — PATIENT MESSAGE (OUTPATIENT)
Dept: INTERNAL MEDICINE | Facility: CLINIC | Age: 64
End: 2024-06-10
Payer: MEDICARE

## 2024-09-14 NOTE — PROGRESS NOTES
HPI    DLS: 5/9/24    Pt here for Overdue Glaucoma Check;  Pt states no eye pain and vision is starting to decrease more harder to   make things out now. Some dry eyes.     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            Assessment /Plan     For exam results, see Encounter Report.    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 Uveitic glaucoma - Both Eyes OD>OS  2/2 sarcoidosis  Old pt of Dr. Wagner and Dr. Menard  First F 2012  First photos 2011     Family history   neg  Glaucoma meds   Off all drops and diamox 2013 to 2014  (pt lost insurance)(( had +++ progression off gtts)                              back on gtts combigan/dorzolamide OU and latanoprost OS  H/O adverse rxn to glaucoma drops  None  LASERS   yag cap od 4/4/2019 - did not help   GLAUCOMA SURGERIES   Trab OD (3/20/ 2010 -Derian), Bleb Revision OD  3/21/2012 [facility]// complex phaco/Baerveldt od  "9/24/2014  OTHER EYE SURGERIES   PPV/Endo O5, CE OS (04)  CDR  0.95/0.5  Tbase  ?    Tmax  36/22 on meds      Ttarget   16/20       HVF  10-2 stim III OD (3/5/12)-sup arc,inf alt, tiny residual island OD; 24-2 OS w/ marked general depression             3/4/2013 ---HVF 10-2 stim III OD - SAD / Inf Alt los s            OD - 7 test 10-2 stim V - 2014 to 2020  - Ext - + prog (NO MORE TESTING OD)             OS - 7 test [id number]   24-2 - stim V OS  // Inf Alt loss/SAD  - stable            PT DECLINES FURTHER VF TESTING     [facility]on[facility] -  PAS OD - 360 // OS -  f    CCT  525/531  OCT  7 test 2011 to 2021   - dec. S/I/N od // Dec S/N/I   HRT  8 test 2012 to 2019  - MR -  Dec. S/T, bord I/N od // xx os /// CDR 9.78 od // xx os  Disc photos  2011, 2013, 2014, 2015, 2018, 2020   - OIS     Ttoday 12/16   ((down from 23/16 - now off steroid gtts od ))  Test done today IOP // Iritis check      2. Secondary iritis - Both Eyes      3. 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 Postinflammatory optic atrophy - Left Eye   -Sarcoid, + APD, uncertain etiology     5. Relative afferent pupillary defect OS     6. Steroid responders borderline glaucoma - Both Eyes -increased IOP w/ Pred Acetate. Does better w/ Vexol     7. Legal blindness - Both EyeS OD based on VF loss and OS based on VA.     8. Band keratopathy os   - chelation with [facility] 9/24/2021     9 Pseudophakic OD  S/P complex phaco/IOL / baerveldt od 9/24/2014   PC IOL os - done years ago - ? [facility] - ? IOL sewn in        Plan:  Uveitic glaucoma  Angle closure - PAS ou   Lost to F/U for 10 months from 9/2013 to 7/2014  - VA od went  from 20/40 to LP  Was already CF at 3 ft OS- 2/2 ON pallor from sarcoid  IOP OD was 64 - off drops since 9/2013 - when re-presented     Pt is "legally blind" - he may want to apply for disability.    Pt previously had issues with insurance, but now has new insurance.  Discussed with patient that the vision loss od is irreversible 2/2 " glaucoma and the importance of compliance with gtt/meds.      Developed  anterior chamber fibrin OD  Post GDD  - it has has resolved - but prone to iritis with any intervention     Pt has seen the pulmonologist  Has seen an internist   Has  seen rheumatology     Was  on MTX for sarcoid and iritis - off - stopped on his own - seems to be doing ok (since 5/30/2018)  No increase in inflammationn od on 7/30/2018 // still ok off steroid gtts 11/12/2018    - Cont combigan tid OU   - cont  dorzolamide OU -  Tid   - cont latanoprost - - OS only     CSM - glaucoma gtts   Stay off steroid gtts for now      yag cap od - 4/4/2019 - did NOT improve the vision     Dry eyes   Gel gtts q hs os and ointment q hs os     Photo file updated 12/16/2021    If needed  can try rhopressa - in either or both eyes     F/U 4 months with  IOP check and  Iritis check and DILATED exam   // declines further VF testing // DFE // photos

## 2024-09-16 ENCOUNTER — OFFICE VISIT (OUTPATIENT)
Dept: OPHTHALMOLOGY | Facility: CLINIC | Age: 64
End: 2024-09-16
Payer: MEDICARE

## 2024-09-16 DIAGNOSIS — Z98.890 S/P YAG CAPSULOTOMY, RIGHT: ICD-10-CM

## 2024-09-16 DIAGNOSIS — H40.043 STEROID RESPONDERS BORDERLINE GLAUCOMA, BILATERAL: ICD-10-CM

## 2024-09-16 DIAGNOSIS — H54.8 LEGAL BLINDNESS: ICD-10-CM

## 2024-09-16 DIAGNOSIS — H47.292 POSTINFLAMMATORY OPTIC ATROPHY, LEFT: ICD-10-CM

## 2024-09-16 DIAGNOSIS — H21.523 PAS (PERIPH ANTERIOR SYNECHIAE), BILATERAL: ICD-10-CM

## 2024-09-16 DIAGNOSIS — H40.43X3 UVEITIC GLAUCOMA, BILATERAL, SEVERE STAGE: Primary | ICD-10-CM

## 2024-09-16 DIAGNOSIS — Z96.1 PSEUDOPHAKIA OF BOTH EYES: ICD-10-CM

## 2024-09-16 DIAGNOSIS — H18.422 BAND KERATOPATHY, LEFT: ICD-10-CM

## 2024-09-16 DIAGNOSIS — D86.83 IRIDOCYCLITIS DUE TO SARCOIDOSIS: ICD-10-CM

## 2024-09-16 DIAGNOSIS — H20.9 UVEITIC GLAUCOMA, BILATERAL, SEVERE STAGE: Primary | ICD-10-CM

## 2024-09-16 PROCEDURE — 99214 OFFICE O/P EST MOD 30 MIN: CPT | Mod: HCNC,S$GLB,, | Performed by: OPHTHALMOLOGY

## 2024-09-16 PROCEDURE — 1160F RVW MEDS BY RX/DR IN RCRD: CPT | Mod: HCNC,CPTII,S$GLB, | Performed by: OPHTHALMOLOGY

## 2024-09-16 PROCEDURE — 3044F HG A1C LEVEL LT 7.0%: CPT | Mod: HCNC,CPTII,S$GLB, | Performed by: OPHTHALMOLOGY

## 2024-09-16 PROCEDURE — 1159F MED LIST DOCD IN RCRD: CPT | Mod: HCNC,CPTII,S$GLB, | Performed by: OPHTHALMOLOGY

## 2024-09-16 PROCEDURE — 99999 PR PBB SHADOW E&M-EST. PATIENT-LVL II: CPT | Mod: PBBFAC,HCNC,, | Performed by: OPHTHALMOLOGY

## 2024-09-27 ENCOUNTER — OFFICE VISIT (OUTPATIENT)
Dept: INTERNAL MEDICINE | Facility: CLINIC | Age: 64
End: 2024-09-27
Payer: MEDICARE

## 2024-09-27 ENCOUNTER — LAB VISIT (OUTPATIENT)
Dept: LAB | Facility: HOSPITAL | Age: 64
End: 2024-09-27
Attending: INTERNAL MEDICINE
Payer: MEDICARE

## 2024-09-27 ENCOUNTER — TELEPHONE (OUTPATIENT)
Dept: INTERNAL MEDICINE | Facility: CLINIC | Age: 64
End: 2024-09-27

## 2024-09-27 VITALS
BODY MASS INDEX: 27.2 KG/M2 | WEIGHT: 183.63 LBS | SYSTOLIC BLOOD PRESSURE: 130 MMHG | HEIGHT: 69 IN | DIASTOLIC BLOOD PRESSURE: 80 MMHG | HEART RATE: 62 BPM | OXYGEN SATURATION: 98 %

## 2024-09-27 DIAGNOSIS — R73.03 PREDIABETES: ICD-10-CM

## 2024-09-27 DIAGNOSIS — E78.49 OTHER HYPERLIPIDEMIA: ICD-10-CM

## 2024-09-27 DIAGNOSIS — D86.9 SARCOID: ICD-10-CM

## 2024-09-27 DIAGNOSIS — D86.0 SARCOIDOSIS OF LUNG: ICD-10-CM

## 2024-09-27 DIAGNOSIS — E78.5 HYPERLIPIDEMIA, UNSPECIFIED HYPERLIPIDEMIA TYPE: Primary | ICD-10-CM

## 2024-09-27 DIAGNOSIS — C61 PROSTATE CANCER: ICD-10-CM

## 2024-09-27 DIAGNOSIS — I10 HYPERTENSION, UNSPECIFIED TYPE: ICD-10-CM

## 2024-09-27 DIAGNOSIS — E78.5 HYPERLIPIDEMIA, UNSPECIFIED HYPERLIPIDEMIA TYPE: ICD-10-CM

## 2024-09-27 DIAGNOSIS — J84.10 CALCIFIED GRANULOMA OF LUNG: ICD-10-CM

## 2024-09-27 DIAGNOSIS — Z23 NEED FOR VACCINATION: ICD-10-CM

## 2024-09-27 PROBLEM — J98.4 CALCIFIED GRANULOMA OF LUNG: Status: ACTIVE | Noted: 2024-09-27

## 2024-09-27 LAB
ALBUMIN SERPL BCP-MCNC: 4 G/DL (ref 3.5–5.2)
ALP SERPL-CCNC: 56 U/L (ref 55–135)
ALT SERPL W/O P-5'-P-CCNC: 26 U/L (ref 10–44)
ANION GAP SERPL CALC-SCNC: 12 MMOL/L (ref 8–16)
AST SERPL-CCNC: 24 U/L (ref 10–40)
BILIRUB SERPL-MCNC: 0.5 MG/DL (ref 0.1–1)
BUN SERPL-MCNC: 14 MG/DL (ref 8–23)
CALCIUM SERPL-MCNC: 10.4 MG/DL (ref 8.7–10.5)
CHLORIDE SERPL-SCNC: 105 MMOL/L (ref 95–110)
CHOLEST SERPL-MCNC: 148 MG/DL (ref 120–199)
CHOLEST/HDLC SERPL: 3.4 {RATIO} (ref 2–5)
CO2 SERPL-SCNC: 25 MMOL/L (ref 23–29)
COMPLEXED PSA SERPL-MCNC: 5.1 NG/ML (ref 0–4)
CREAT SERPL-MCNC: 1 MG/DL (ref 0.5–1.4)
EST. GFR  (NO RACE VARIABLE): >60 ML/MIN/1.73 M^2
ESTIMATED AVG GLUCOSE: 123 MG/DL (ref 68–131)
GLUCOSE SERPL-MCNC: 96 MG/DL (ref 70–110)
HBA1C MFR BLD: 5.9 % (ref 4–5.6)
HDLC SERPL-MCNC: 44 MG/DL (ref 40–75)
HDLC SERPL: 29.7 % (ref 20–50)
LDLC SERPL CALC-MCNC: 87.8 MG/DL (ref 63–159)
NONHDLC SERPL-MCNC: 104 MG/DL
POTASSIUM SERPL-SCNC: 3.9 MMOL/L (ref 3.5–5.1)
PROT SERPL-MCNC: 7.6 G/DL (ref 6–8.4)
SODIUM SERPL-SCNC: 142 MMOL/L (ref 136–145)
TRIGL SERPL-MCNC: 81 MG/DL (ref 30–150)

## 2024-09-27 PROCEDURE — 80053 COMPREHEN METABOLIC PANEL: CPT | Mod: HCNC | Performed by: INTERNAL MEDICINE

## 2024-09-27 PROCEDURE — 80061 LIPID PANEL: CPT | Mod: HCNC | Performed by: INTERNAL MEDICINE

## 2024-09-27 PROCEDURE — 83036 HEMOGLOBIN GLYCOSYLATED A1C: CPT | Mod: HCNC | Performed by: INTERNAL MEDICINE

## 2024-09-27 PROCEDURE — 99999 PR PBB SHADOW E&M-EST. PATIENT-LVL III: CPT | Mod: PBBFAC,HCNC,, | Performed by: INTERNAL MEDICINE

## 2024-09-27 PROCEDURE — 84153 ASSAY OF PSA TOTAL: CPT | Mod: HCNC | Performed by: INTERNAL MEDICINE

## 2024-09-27 RX ORDER — ROSUVASTATIN CALCIUM 20 MG/1
20 TABLET, COATED ORAL DAILY
Qty: 90 TABLET | Refills: 3 | Status: SHIPPED | OUTPATIENT
Start: 2024-09-27

## 2024-09-27 RX ORDER — HYDROCHLOROTHIAZIDE 25 MG/1
25 TABLET ORAL DAILY
Qty: 90 TABLET | Refills: 3 | Status: SHIPPED | OUTPATIENT
Start: 2024-09-27

## 2024-09-27 NOTE — PROGRESS NOTES
Mr. Lackey is a 64 -year-old gentleman coming   in today to follow up on his ongoing medical problems and to discuss holistic approach to improve his health.   He was last seen by me  in 3/18/2024. . His medical problems include:          1. Hypertension. He is on Norvasc 10 mg a day. He has had hypertension   for several years.  bp 130/80 .    He is not checking his bp at home. Has signed up  with digital hypertension program,  should be getting device soon .no med changes. He denies any chest pain, short-windedness, palpitations, nausea or vomiting.        2. He has hyperlipidemia. He is on rousovastatin  increased to 20 mg in Feb of this year-- He was on  pravastatin 40 mg a day. Last lipid panel was 1/23/2024, LDL was 168!.  Needs to recheck      3. He has vitamin D deficiency with history of sarcoidosis. We put him on   vitamin D. He is on a 2 x monthly vitamin D. In April 2024 his Vit was 33-  he continues on his vitamin-D.- and he is taking one a week-.        4. Sarcoidosis. .He was 1st diagnosed with sarcoid in the 1990sMost prominent sarcoid issue is uveitis glaucoma.  He is seeing opthomology and see Dr Davila in Rheumatology-- last seen 2/2022.  He was on MTX for a while, but is off this now.  He denies any respiratory symptoms including wheezing or exercise intolerance. Has not used inhaler in a year. Had some fibrosis on chest ct of          5. 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          6. Prostate cancer -- dx in 3/2020--   low risk prostate CA,  continued active surveillance.  He saw Urology last 9/22/2023.  Note reviewed.  Recommend active surveillance.  Due back to see Urology next  month with psa.  Wants to do PSA today instead of next week to save a trip-- I can do that      7. Prediabetes-  6.2 in  March 2024-- diet stable           "        ROS : Gen - no fatigue or significant weight change  Eyes - no eye pain.  ENT - no hoarseness or sore throat  CV - No chest pain NO palpitations.   Pulm - no cough, wheezing or dyspnea of exertion-- otherwise as above.   GI - no N/V/D   no dysuria or incontinence  MS - no joint pain or muscle pain  Skin - no rash, or c/o of skin lesions  Neuro - no HA, dizziness--- memory is doing well.   Heme - no abnormal bleeding or bruising  Endo - no polydipsia, or temperature changes  Psych - no anxiety or depression             /80 (BP Location: Right arm, Patient Position: Sitting, BP Method: Medium (Manual))   Pulse 62   Ht 5' 9" (1.753 m)   Wt 83.3 kg (183 lb 10.3 oz)   SpO2 98%   BMI 27.12 kg/m²        GENERAL: He is a well-appearing 64 --year-old gentleman in no acute   distress.  Poor vision- Legally blind.  Here by himself today    Ear examination was unremarkable.   Eyes- sclera is injected but normal for him- no ptosis.   NECK: Supple. He has no JVD. Thyroid is not enlarged. Chest- CTA bilaterally.   CARDIOVASCULAR: S1, S2. Regular rate and rhythm.   LUNGS: Clear bilaterally- no wheeze.   ABDOMEN: Soft, nontender, no hepatosplenomegaly, no guarding, rebound or   tenderness. Slight distended.   LOWER EXTREMITIES: No edema.  NO hair on legs.       ASSESSMENT:      1. Hypertension, -continue the hctz 25 mg a day and  Amlodipine  10 mg a day   2. Hyperlipidemia Continue the rousovastatin will change form two 10 mag a day to one 20 mg pill a day-- check cholesterol today    3. Sarcoidosis, Reviewed notes from Rheum,--follow up  with Rheum.  Has lung disease but no sob- I will get him back to Rheumatology -- has calcified lung nodule -- will monitor   4. Prediabetes- discussed diet-  will check labs    5. . Prostate Cancer-- He last saw Urology in 3/2023---- is doing active surveillance. FU with urology- will get PSA -- sees Urology early next month      Will follow up in 6 months.        Will get flu " and covid booster.       Our office providers are the focal point for all needed health care services that are part of ongoing care related to a patient's single serious condition or the patient's complex conditions.

## 2024-10-01 ENCOUNTER — TELEPHONE (OUTPATIENT)
Dept: OPHTHALMOLOGY | Facility: CLINIC | Age: 64
End: 2024-10-01
Payer: MEDICARE

## 2024-10-04 ENCOUNTER — OFFICE VISIT (OUTPATIENT)
Dept: UROLOGY | Facility: CLINIC | Age: 64
End: 2024-10-04
Payer: MEDICARE

## 2024-10-04 VITALS
DIASTOLIC BLOOD PRESSURE: 67 MMHG | BODY MASS INDEX: 27.2 KG/M2 | HEIGHT: 69 IN | SYSTOLIC BLOOD PRESSURE: 124 MMHG | WEIGHT: 183.63 LBS | HEART RATE: 46 BPM

## 2024-10-04 DIAGNOSIS — N40.1 BENIGN PROSTATIC HYPERPLASIA WITH URINARY OBSTRUCTION: ICD-10-CM

## 2024-10-04 DIAGNOSIS — N13.8 BENIGN PROSTATIC HYPERPLASIA WITH URINARY OBSTRUCTION: ICD-10-CM

## 2024-10-04 DIAGNOSIS — C61 PROSTATE CANCER: Primary | ICD-10-CM

## 2024-10-04 PROCEDURE — 99999 PR PBB SHADOW E&M-EST. PATIENT-LVL III: CPT | Mod: PBBFAC,HCNC,,

## 2024-10-04 RX ORDER — TAMSULOSIN HYDROCHLORIDE 0.4 MG/1
0.4 CAPSULE ORAL EVERY 12 HOURS
Qty: 180 CAPSULE | Refills: 3 | Status: SHIPPED | OUTPATIENT
Start: 2024-10-04 | End: 2025-09-29

## 2024-10-04 NOTE — PROGRESS NOTES
CHIEF COMPLAINT:  Prostate cancer       HISTORY OF PRESENTING ILLINESS:  Patient is a 64 y.o. male who is established to our clinic who presents for prostate cancer/active surveillance.     PSA   9/27/2024 5.1 ng/ml   3/18/2024 5.7 ng/ml  9/18/2023 6.2 ng/ml     Sister had bone cancer.      He underwent a TRUS biopsy on 1/29/20---no post-biopsy problems.    Showed [place] 3+3 prostate cancer as shown below.      TRUS Volume: 52cc    PATHOLOGY:  Final Pathologic Diagnosis 1.  Prostate, left apex (biopsy):   Benign prostatic glands and stroma     2.  Prostate, left middle (biopsy):   Benign prostatic glands and stroma     3.  Prostate, left base (biopsy):   Benign seminal vesicles and prostatic glands and stroma     4.  Prostate, right apex (biopsy):   Atypical small acinar proliferation     5.  Prostate, right middle (biopsy):   Benign prostatic glands and stroma     6.  Prostate, right base (biopsy):   Prostatic adenocarcinoma, Baldomero score 3+ 3 = 6   Tumor involves 1 of 2 tissue cores   Tumor comprises less than 5% of submitted tissue       PSA remains stable. Will continue active surveillance with PSA every 6 months.     Reports weak FOS in the morning. Has been taking 0.8 mg Flomax every morning. No side effects noted.      No problems with erections at this time.          REVIEW OF SYSTEMS:  Review of Systems   Constitutional:  Negative for chills and fever.   HENT:  Negative for congestion and sore throat.    Eyes:  Negative for blurred vision.   Respiratory:  Negative for cough and shortness of breath.    Cardiovascular:  Negative for chest pain and palpitations.   Gastrointestinal:  Negative for nausea and vomiting.   Genitourinary:  Negative for dysuria, flank pain, frequency, hematuria and urgency.   Neurological:  Negative for dizziness and headaches.         PATIENT HISTORY:    Past Medical History:   Diagnosis Date    Dry eyes     Glaucoma     Hyperlipidemia     Hypertension     Myalgia and myositis  2/16/2016    Prostate cancer 1/15/2021    Sarcoidosis of lung     Uveitis        Past Surgical History:   Procedure Laterality Date    BAERVETREVOR GLAUCOMA IMPLANT AND CATARACT REMOVAL Right 9/24/14    OD ()    Bleb Revision Right 3/12    OD with Dr. Pratt    CATARACT EXTRACTION W/  INTRAOCULAR LENS IMPLANT Left n/a    Dr. Menard    CATARACT EXTRACTION W/  INTRAOCULAR LENS IMPLANT Right 09/24/2014    WITH ELIAN ()    FRACTURE SURGERY      right foot    [place] Bleb Revision   3/12    OD Dr. Pratt    PARS PLANA VITRECTOMY W/ ENDOPHOTOCOAGULATION Left 2005    TRABECULECTOMY Right 2010    OD Dr. Menard       Family History   Problem Relation Name Age of Onset    Heart disease Father      No Known Problems Mother      No Known Problems Brother      Blindness Maternal Uncle      Glaucoma Paternal Aunt      No Known Problems Sister      No Known Problems Maternal Aunt      No Known Problems Paternal Uncle      No Known Problems Maternal Grandmother      No Known Problems Maternal Grandfather      No Known Problems Paternal Grandmother      No Known Problems Paternal Grandfather      No Known Problems Daughter      No Known Problems Son      No Known Problems Sister      Hypertension Sister      No Known Problems Brother      No Known Problems Brother      Hypertension Brother      Cancer Neg Hx      Macular degeneration Neg Hx      Retinal detachment Neg Hx      Rheum arthritis Neg Hx      Lupus Neg Hx      Inflammatory bowel disease Neg Hx      Psoriasis Neg Hx      Amblyopia Neg Hx      Cataracts Neg Hx      Strabismus Neg Hx      Stroke Neg Hx      Thyroid disease Neg Hx         Social History     Socioeconomic History    Marital status:      Spouse name: Riccardo    Number of children: 2   Occupational History     Employer: [employer]   Tobacco Use    Smoking status: Never     Passive exposure: Past (cigarettes w father bein a smoker, & prior occupation in HVAC -inhalation  of chemicals)    Smokeless tobacco: Never    Tobacco comments:     disability since 2013;    Substance and Sexual Activity    Alcohol use: Yes     Comment: Moderate    Drug use: No    Sexual activity: Yes     Partners: Female     Social Drivers of Health     Financial Resource Strain: Medium Risk (7/31/2023)    Overall Financial Resource Strain (CARDIA)     Difficulty of Paying Living Expenses: Somewhat hard   Food Insecurity: No Food Insecurity (7/31/2023)    Hunger Vital Sign     Worried About Running Out of Food in the Last Year: Never true     Ran Out of Food in the Last Year: Never true   Transportation Needs: No Transportation Needs (7/31/2023)    PRAPARE - Transportation     Lack of Transportation (Medical): No     Lack of Transportation (Non-Medical): No   Physical Activity: Sufficiently Active (11/5/2021)    Exercise Vital Sign     Days of Exercise per Week: 4 days     Minutes of Exercise per Session: 60 min   Stress: No Stress Concern Present (7/31/2023)    [employer] - Occupational Stress Questionnaire     Feeling of Stress : Not at all   Housing Stability: Unknown (7/31/2023)    Housing Stability Vital Sign     Unable to Pay for Housing in the Last Year: No     Unstable Housing in the Last Year: No       Allergies:  Patient has no known allergies.    Medications:    Current Outpatient Medications:     amLODIPine (NORVASC) 10 MG tablet, Take 1 tablet (10 mg total) by mouth once daily., Disp: 90 tablet, Rfl: 2    COMBIGAN 0.2-0.5 % Drop, Place 1 drop into both eyes 3 (three) times daily., Disp: 15 mL, Rfl: 3    COVID-19 (COMIRNATY 2024-25, 12Y UP,,PF,) 30 mcg/0.3 mL IM vaccine (>/= 11 yo), Inject 0.3 mLs into the muscle once. for 1 dose, Disp: 0.3 mL, Rfl: 0    dorzolamide (TRUSOPT) 2 % ophthalmic solution, Place 1 drop into both eyes 3 (three) times daily., Disp: 30 mL, Rfl: 3    ergocalciferol (ERGOCALCIFEROL) 50,000 unit Cap, TAKE 1 CAPSULE BY MOUTH ONE TIME PER  WEEK, Disp: 12 capsule, Rfl: 8    hydroCHLOROthiazide (HYDRODIURIL) 25 MG tablet, Take 1 tablet (25 mg total) by mouth once daily., Disp: 90 tablet, Rfl: 3    latanoprost 0.005 % ophthalmic solution, Place 1 drop into the left eye every evening., Disp: 7.5 mL, Rfl: 3    multivitamin capsule, Take 1 capsule by mouth once daily., Disp: , Rfl:     rosuvastatin (CRESTOR) 20 MG tablet, Take 1 tablet (20 mg total) by mouth once daily., Disp: 90 tablet, Rfl: 3    tamsulosin (FLOMAX) 0.4 mg Cap, Take 1 capsule (0.4 mg total) by mouth every 12 (twelve) hours. Take 1 capsule 30 min after breakfast and 1 capsule 30 min after dinner., Disp: 180 capsule, Rfl: 3    PHYSICAL EXAMINATION:  Physical Exam  HENT:      Head: Normocephalic and atraumatic.      Right Ear: External ear normal.      Left Ear: External ear normal.      Nose: Nose normal.      Mouth/Throat:      Mouth: Mucous membranes are moist.   Pulmonary:      Effort: Pulmonary effort is normal. No respiratory distress.   Skin:     General: Skin is warm and dry.   Neurological:      General: No focal deficit present.      Mental Status: He is alert and oriented to person, place, and time.   Psychiatric:         Mood and Affect: Mood normal.         Behavior: Behavior normal.           Lab Results   Component Value Date    PSA 5.8 (H) 11/25/2019    PSA 3.6 09/10/2018    PSA 2.4 02/16/2016    PSADIAG 5.1 (H) 09/27/2024    PSADIAG 5.7 (H) 03/18/2024    PSADIAG 6.2 (H) 09/18/2023       Lab Results   Component Value Date    CREATININE 1.0 09/27/2024    EGFRNORACEVR >60.0 09/27/2024           IMPRESSION:  Encounter Diagnoses   Name Primary?    Prostate cancer Yes    Benign prostatic hyperplasia with urinary obstruction          Assessment:       1. Prostate cancer    2. Benign prostatic hyperplasia with urinary obstruction        Plan:   1. Prostate cancer:  - currently on active surveillance.   - PSA today is stable.   -given very low risk prostate CA, recommended continued  active surveillance q 6 months. Previously order MRI not scheduled due to insurance conflict. Will plan MRI if PSA rises.      2. BPH:  - continue tamsulosin 0.4 mg, increase frequency. 1 capsule 30 min after breakfast and 1 capsule 30 min after dinner.   - avoid bladder irritants. Drinks 1/2 cup coffee every other day. Eats a lot of chocolate.      3. RTC in 6 months with PSA       I spent 30 minutes with the patient. Over 50% of the visit was spent in counseling.

## 2024-10-31 ENCOUNTER — OFFICE VISIT (OUTPATIENT)
Dept: INTERNAL MEDICINE | Facility: CLINIC | Age: 64
End: 2024-10-31
Payer: MEDICARE

## 2024-10-31 VITALS
DIASTOLIC BLOOD PRESSURE: 64 MMHG | HEIGHT: 69 IN | OXYGEN SATURATION: 95 % | BODY MASS INDEX: 26.94 KG/M2 | SYSTOLIC BLOOD PRESSURE: 136 MMHG | WEIGHT: 181.88 LBS | HEART RATE: 52 BPM

## 2024-10-31 DIAGNOSIS — D86.0 SARCOIDOSIS OF LUNG: ICD-10-CM

## 2024-10-31 DIAGNOSIS — R91.8 MULTIPLE LUNG NODULES ON CT: ICD-10-CM

## 2024-10-31 DIAGNOSIS — H20.9 UVEITIC GLAUCOMA, UNSPECIFIED GLAUCOMA STAGE, UNSPECIFIED LATERALITY: ICD-10-CM

## 2024-10-31 DIAGNOSIS — D86.83 IRIDOCYCLITIS DUE TO SARCOIDOSIS: ICD-10-CM

## 2024-10-31 DIAGNOSIS — R73.03 PREDIABETES: ICD-10-CM

## 2024-10-31 DIAGNOSIS — I10 PRIMARY HYPERTENSION: ICD-10-CM

## 2024-10-31 DIAGNOSIS — N40.1 BENIGN PROSTATIC HYPERPLASIA WITH LOWER URINARY TRACT SYMPTOMS, SYMPTOM DETAILS UNSPECIFIED: ICD-10-CM

## 2024-10-31 DIAGNOSIS — E78.5 HYPERLIPIDEMIA, UNSPECIFIED HYPERLIPIDEMIA TYPE: ICD-10-CM

## 2024-10-31 DIAGNOSIS — Z00.00 ENCOUNTER FOR PREVENTIVE HEALTH EXAMINATION: Primary | ICD-10-CM

## 2024-10-31 DIAGNOSIS — J98.4 CALCIFIED GRANULOMA OF LUNG: ICD-10-CM

## 2024-10-31 DIAGNOSIS — E55.9 VITAMIN D DEFICIENCY DISEASE: ICD-10-CM

## 2024-10-31 DIAGNOSIS — C61 PROSTATE CANCER: ICD-10-CM

## 2024-10-31 DIAGNOSIS — H40.40X0 UVEITIC GLAUCOMA, UNSPECIFIED GLAUCOMA STAGE, UNSPECIFIED LATERALITY: ICD-10-CM

## 2024-10-31 PROCEDURE — 99999 PR PBB SHADOW E&M-EST. PATIENT-LVL III: CPT | Mod: PBBFAC,HCNC,, | Performed by: NURSE PRACTITIONER

## 2024-11-20 ENCOUNTER — TELEPHONE (OUTPATIENT)
Dept: OPHTHALMOLOGY | Facility: CLINIC | Age: 64
End: 2024-11-20
Payer: MEDICARE

## 2024-11-20 DIAGNOSIS — H20.9 UVEITIC GLAUCOMA, BILATERAL, SEVERE STAGE: ICD-10-CM

## 2024-11-20 DIAGNOSIS — H40.43X3 UVEITIC GLAUCOMA, BILATERAL, SEVERE STAGE: ICD-10-CM

## 2024-11-20 RX ORDER — DORZOLAMIDE HCL 20 MG/ML
1 SOLUTION/ DROPS OPHTHALMIC 3 TIMES DAILY
Qty: 30 ML | Refills: 3 | Status: SHIPPED | OUTPATIENT
Start: 2024-11-20

## 2024-11-20 NOTE — TELEPHONE ENCOUNTER
[facility] does have Dorzolamide in stock. I left a message for pt to see if he wanted Rx sent there or if there or another pharmacy. [facility] has not been able to get it through their supplier.

## 2024-11-20 NOTE — TELEPHONE ENCOUNTER
----- Message from Sonja sent at 11/20/2024 11:32 AM CST -----  Pt calling in regards to another medication dorzolamide (TRUSOPT) 2 % ophthalmic     solution  pharmacy doesn't have that medication  , is there another medication he can use     Confirmed patient's contact info below:  Contact Name: Jaguar Lackey  Phone Number: [phone]

## 2024-12-12 RX ORDER — AMLODIPINE BESYLATE 10 MG/1
10 TABLET ORAL
Qty: 90 TABLET | Refills: 3 | Status: SHIPPED | OUTPATIENT
Start: 2024-12-12

## 2024-12-12 NOTE — TELEPHONE ENCOUNTER
Refill Decision Note   Jaguar Lackey  is requesting a refill authorization.  Brief Assessment and Rationale for Refill:  Approve     Medication Therapy Plan:         Comments:     Note composed:8:53 AM 12/12/2024

## 2024-12-12 NOTE — TELEPHONE ENCOUNTER
No care due was identified.  [facility] Embedded Care Due Messages. Reference number: [id number].   12/12/2024 12:45:32 AM CST

## 2024-12-13 ENCOUNTER — OFFICE VISIT (OUTPATIENT)
Dept: URGENT CARE | Facility: CLINIC | Age: 64
End: 2024-12-13
Payer: MEDICARE

## 2024-12-13 VITALS
HEART RATE: 60 BPM | HEIGHT: 69 IN | RESPIRATION RATE: 18 BRPM | WEIGHT: 183 LBS | TEMPERATURE: 97 F | SYSTOLIC BLOOD PRESSURE: 138 MMHG | BODY MASS INDEX: 27.11 KG/M2 | DIASTOLIC BLOOD PRESSURE: 80 MMHG

## 2024-12-13 DIAGNOSIS — M79.672 FOOT PAIN, LEFT: Primary | ICD-10-CM

## 2024-12-13 PROCEDURE — 73630 X-RAY EXAM OF FOOT: CPT | Mod: LT,S$GLB,, | Performed by: RADIOLOGY

## 2024-12-13 NOTE — PATIENT INSTRUCTIONS
REST ICE Compression Elevation   Tylenol/Ibuprofen as needed for pain      Refer to ortho as needed

## 2024-12-13 NOTE — LETTER
December 13, 2024      [facility] [address] [place] [facility] [address]  Phone: [phone]  Fax: [phone]       Patient: Jaguar Lackey   YOB: 1960  Date of Visit: 12/13/2024    To Whom It May Concern:    Sherita Lackey  was at [facility] on 12/13/2024. The patient may return to work/school on 12/14/2024 with no restrictions. If you have any questions or concerns, or if I can be of further assistance, please do not hesitate to contact me.    Sincerely,    Kathy Joe NP
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
## 2025-01-11 NOTE — PROGRESS NOTES
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            Assessment /Plan     For exam results, see Encounter Report.    Uveitic glaucoma, bilateral, severe stage  -     COMBIGAN 0.2-0.5 % Drop; Place 1 drop into both eyes 3 (three) times daily.  Dispense: 15 mL; Refill: 3  -     latanoprost 0.005 % ophthalmic solution; Place 1 drop into the left eye every evening.  Dispense: 7.5 mL; Refill: 3  -     dorzolamide (TRUSOPT) 2 % ophthalmic solution; Place 1 drop into both eyes 3 (three) times daily.  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    Postinflammatory optic atrophy, left    Band keratopathy, left    Steroid responders borderline glaucoma, bilateral    Pas (periph anterior synechiae), bilateral    Legal blindness - Both Eyes    Pseudophakia of both eyes    S/P YAG capsulotomy, right        IOP was up to 64 OD off all meds  (7/15/2014)  Was still too high back on maximum meds -- had baerveldt OD - 9/2014   Lost to F/U 8/11/2022 to 5/9/2024 ( about 1.75 years) - still using gtts   Pt did a 9 month boarding training course - with [facility] - affiliated [facility] [place] - very helpful   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 Uveitic glaucoma - Both Eyes OD>OS  2/2 sarcoidosis  Old pt of Dr. Wagner and Dr. Derian [facility] 2012  First photos 2011     Family history   neg  Glaucoma meds   Off all drops and diamox 2013 to 2014  (pt lost insurance)(( had +++ progression off gtts)                               back on gtts combigan/dorzolamide OU and latanoprost OS  H/O adverse rxn to glaucoma drops  None  LASERS   yag cap od 4/4/2019 - did not help   GLAUCOMA SURGERIES   Trab OD (3/20/ 2010 -[facility]), Bleb Revision OD  3/21/2012 Loft// complex phaco/Baerveldt od 9/24/2014  OTHER EYE SURGERIES   PPV/Endo O5, CE OS (04)  CDR  0.95/0.5  Tbase  ?    Tmax  36/22 on meds      Ttarget   16/20       HVF  10-2 stim III OD (3/5/12)-sup arc,inf alt, tiny residual island OD; 24-2 OS w/ marked general depression             3/4/2013 ---HVF 10-2 stim III OD - SAD / Inf Alt los s            OD - 7 test 10-2 stim V - 2014 to 2020  - Ext - + prog (NO MORE TESTING OD)             OS - 7 test 2335-1322   24-2 - stim V OS  // Inf Alt loss/SAD  - stable            PT DECLINES FURTHER VF TESTING     Gonio -  PAS OD - 360 // OS -  f    CCT  525/531  OCT  7 test 2011 to 2021   - dec. S/I/N od // Dec S/N/I   HRT  8 test 2012 to 2019  - MR -  Dec. S/T, bord I/N od // xx os /// CDR 9.78 od // xx os  Disc photos  2011, 2013, 2014, 2015, 2018, 2020   - OIS     Ttoday 8/10   ((down from 23/16 - now off steroid gtts od ))  Test done today IOP // Iritis check / DFE      2. Secondary iritis - Both Eyes      3. 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 Postinflammatory optic atrophy - Left Eye   -Sarcoid, + APD, uncertain etiology     5. Relative afferent pupillary defect OS     6. Steroid responders borderline glaucoma - Both Eyes -increased IOP w/ Pred Acetate. Does better w/ Vexol     7. Legal blindness - Both EyeS OD based on VF loss and OS based on VA.     8. Band keratopathy os   - chelation with [facility] 9/24/2021     9 Pseudophakic OD  S/P complex phaco/IOL / baerveldt od 9/24/2014   PC IOL os - done years ago - ? [facility] - ? IOL sewn in        Plan:  Uveitic glaucoma  Angle closure - PAS ou   Lost to F/U for 10 months from 9/2013 to 7/2014  - VA od went  from 20/40 to LP  Was already CF at 3 ft OS- 2/2 ON  "pallor from sarcoid  IOP OD was 64 - off drops since 9/2013 - when re-presented in 7/2014     Pt is "legally blind" - he may want to apply for disability.    Pt previously had issues with insurance, but now has new insurance.  Discussed with patient that the vision loss od is irreversible 2/2 glaucoma and the importance of compliance with gtt/meds.      Developed  anterior chamber fibrin OD  Post GDD  - it has has resolved - but prone to iritis with any intervention     Pt has seen the pulmonologist  Has seen an internist   Has  seen rheumatology     Was  on MTX for sarcoid and iritis - off - stopped on his own - seems to be doing ok (since 5/30/2018)  No increase in inflammationn od on 7/30/2018 // still ok off steroid gtts 11/12/2018 // still ok 1/2025     - Cont combigan tid OU   - cont  dorzolamide OU -  Tid   - cont latanoprost - - OS only     CSM - glaucoma gtts   Stay off steroid gtts for now      yag cap od - 4/4/2019 - did NOT improve the vision     Dry eyes   Gel gtts q hs os and ointment q hs os     Photo file updated 12/16/2021    If needed  can try rhopressa - in either or both eyes     1/13/2025   IOP good ou  Exam stable   Pt wants to try ray-ban meta wayfare smart glasses - he thinks his [employer] insurance will pay   Rx written - told to reheck and be sure his insurance will cover it before purchasing from [employer]     F/U 4 months with  IOP check and  Iritis check   // declines further VF testing // DFE // photos       "

## 2025-01-13 ENCOUNTER — PATIENT MESSAGE (OUTPATIENT)
Dept: OPHTHALMOLOGY | Facility: CLINIC | Age: 65
End: 2025-01-13

## 2025-01-13 ENCOUNTER — OFFICE VISIT (OUTPATIENT)
Dept: OPHTHALMOLOGY | Facility: CLINIC | Age: 65
End: 2025-01-13
Payer: MEDICARE

## 2025-01-13 DIAGNOSIS — H18.422 BAND KERATOPATHY, LEFT: ICD-10-CM

## 2025-01-13 DIAGNOSIS — Z98.890 S/P YAG CAPSULOTOMY, RIGHT: ICD-10-CM

## 2025-01-13 DIAGNOSIS — H20.9 UVEITIC GLAUCOMA, BILATERAL, SEVERE STAGE: Primary | ICD-10-CM

## 2025-01-13 DIAGNOSIS — H40.43X3 UVEITIC GLAUCOMA, BILATERAL, SEVERE STAGE: Primary | ICD-10-CM

## 2025-01-13 DIAGNOSIS — Z96.1 PSEUDOPHAKIA OF BOTH EYES: ICD-10-CM

## 2025-01-13 DIAGNOSIS — D86.83 IRIDOCYCLITIS DUE TO SARCOIDOSIS: ICD-10-CM

## 2025-01-13 DIAGNOSIS — H40.043 STEROID RESPONDERS BORDERLINE GLAUCOMA, BILATERAL: ICD-10-CM

## 2025-01-13 DIAGNOSIS — H54.8 LEGAL BLINDNESS: ICD-10-CM

## 2025-01-13 DIAGNOSIS — H21.523 PAS (PERIPH ANTERIOR SYNECHIAE), BILATERAL: ICD-10-CM

## 2025-01-13 DIAGNOSIS — H47.292 POSTINFLAMMATORY OPTIC ATROPHY, LEFT: ICD-10-CM

## 2025-01-13 PROCEDURE — 99999 PR PBB SHADOW E&M-EST. PATIENT-LVL II: CPT | Mod: PBBFAC,HCNC,, | Performed by: OPHTHALMOLOGY

## 2025-01-13 PROCEDURE — 1159F MED LIST DOCD IN RCRD: CPT | Mod: HCNC,CPTII,S$GLB, | Performed by: OPHTHALMOLOGY

## 2025-01-13 PROCEDURE — 1160F RVW MEDS BY RX/DR IN RCRD: CPT | Mod: HCNC,CPTII,S$GLB, | Performed by: OPHTHALMOLOGY

## 2025-01-13 PROCEDURE — 99214 OFFICE O/P EST MOD 30 MIN: CPT | Mod: HCNC,S$GLB,, | Performed by: OPHTHALMOLOGY

## 2025-01-13 RX ORDER — DORZOLAMIDE HCL 20 MG/ML
1 SOLUTION/ DROPS OPHTHALMIC 3 TIMES DAILY
Qty: 30 ML | Refills: 3 | Status: SHIPPED | OUTPATIENT
Start: 2025-01-13

## 2025-01-13 RX ORDER — BRIMONIDINE TARTRATE, TIMOLOL MALEATE 2; 5 MG/ML; MG/ML
1 SOLUTION/ DROPS OPHTHALMIC 3 TIMES DAILY
Qty: 15 ML | Refills: 3 | Status: SHIPPED | OUTPATIENT
Start: 2025-01-13

## 2025-01-13 RX ORDER — LATANOPROST 50 UG/ML
1 SOLUTION/ DROPS OPHTHALMIC NIGHTLY
Qty: 7.5 ML | Refills: 3 | Status: SHIPPED | OUTPATIENT
Start: 2025-01-13

## 2025-01-13 RX ORDER — ROSUVASTATIN CALCIUM 10 MG/1
20 TABLET, COATED ORAL DAILY
COMMUNITY
Start: 2024-12-13

## 2025-02-17 ENCOUNTER — TELEPHONE (OUTPATIENT)
Dept: INTERNAL MEDICINE | Facility: CLINIC | Age: 65
End: 2025-02-17
Payer: MEDICARE

## 2025-02-17 NOTE — TELEPHONE ENCOUNTER
----- Message from Jaen sent at 2/17/2025 12:31 PM CST -----  Contact: Dr. Hope [phone]- Insurance Agency  .1MEDICALADVICE Patient is calling for Medical Advice regarding: Dr. Cano with the insurance Agency  is calling to request to provide a verbal verification staying is the patient has any Chronic condition as Diabetic, Hypertension or cardiovascular Disease.  Caller states patient Is legal blind and  this is formation is need it to keep his insurance. Phone [phone] wants a call back or thru [facility]: call back Comments:Please advise patient replies from provider may take up to 48 hours.

## 2025-03-21 ENCOUNTER — OFFICE VISIT (OUTPATIENT)
Dept: INTERNAL MEDICINE | Facility: CLINIC | Age: 65
End: 2025-03-21

## 2025-03-21 ENCOUNTER — LAB VISIT (OUTPATIENT)
Dept: LAB | Facility: HOSPITAL | Age: 65
End: 2025-03-21
Attending: INTERNAL MEDICINE

## 2025-03-21 VITALS
HEIGHT: 69 IN | HEART RATE: 56 BPM | BODY MASS INDEX: 27.69 KG/M2 | WEIGHT: 186.94 LBS | DIASTOLIC BLOOD PRESSURE: 80 MMHG | SYSTOLIC BLOOD PRESSURE: 128 MMHG | OXYGEN SATURATION: 100 %

## 2025-03-21 DIAGNOSIS — H54.8 LEGAL BLINDNESS: ICD-10-CM

## 2025-03-21 DIAGNOSIS — E78.49 OTHER HYPERLIPIDEMIA: ICD-10-CM

## 2025-03-21 DIAGNOSIS — E78.5 HYPERLIPIDEMIA, UNSPECIFIED HYPERLIPIDEMIA TYPE: Primary | ICD-10-CM

## 2025-03-21 DIAGNOSIS — R73.03 PREDIABETES: ICD-10-CM

## 2025-03-21 DIAGNOSIS — D86.83 IRIDOCYCLITIS DUE TO SARCOIDOSIS: ICD-10-CM

## 2025-03-21 DIAGNOSIS — C61 PROSTATE CANCER: ICD-10-CM

## 2025-03-21 DIAGNOSIS — I10 HYPERTENSION, UNSPECIFIED TYPE: ICD-10-CM

## 2025-03-21 DIAGNOSIS — D86.0 SARCOIDOSIS OF LUNG: ICD-10-CM

## 2025-03-21 PROBLEM — R93.89 ABNORMAL CT OF THE CHEST: Status: RESOLVED | Noted: 2023-07-31 | Resolved: 2025-03-21

## 2025-03-21 LAB
ALBUMIN SERPL BCP-MCNC: 4 G/DL (ref 3.5–5.2)
ALP SERPL-CCNC: 52 U/L (ref 40–150)
ALT SERPL W/O P-5'-P-CCNC: 29 U/L (ref 10–44)
ANION GAP SERPL CALC-SCNC: 8 MMOL/L (ref 8–16)
AST SERPL-CCNC: 23 U/L (ref 10–40)
BILIRUB SERPL-MCNC: 0.7 MG/DL (ref 0.1–1)
BUN SERPL-MCNC: 13 MG/DL (ref 8–23)
CALCIUM SERPL-MCNC: 9.3 MG/DL (ref 8.7–10.5)
CHLORIDE SERPL-SCNC: 109 MMOL/L (ref 95–110)
CO2 SERPL-SCNC: 25 MMOL/L (ref 23–29)
CREAT SERPL-MCNC: 0.8 MG/DL (ref 0.5–1.4)
EST. GFR  (NO RACE VARIABLE): >60 ML/MIN/1.73 M^2
ESTIMATED AVG GLUCOSE: 126 MG/DL (ref 68–131)
GLUCOSE SERPL-MCNC: 85 MG/DL (ref 70–110)
HBA1C MFR BLD: 6 % (ref 4–5.6)
POTASSIUM SERPL-SCNC: 4.2 MMOL/L (ref 3.5–5.1)
PROT SERPL-MCNC: 7.5 G/DL (ref 6–8.4)
SODIUM SERPL-SCNC: 142 MMOL/L (ref 136–145)

## 2025-03-21 PROCEDURE — 83036 HEMOGLOBIN GLYCOSYLATED A1C: CPT | Performed by: INTERNAL MEDICINE

## 2025-03-21 PROCEDURE — 80053 COMPREHEN METABOLIC PANEL: CPT | Performed by: INTERNAL MEDICINE

## 2025-03-21 PROCEDURE — 99214 OFFICE O/P EST MOD 30 MIN: CPT | Mod: PBBFAC | Performed by: INTERNAL MEDICINE

## 2025-03-21 PROCEDURE — 99999 PR PBB SHADOW E&M-EST. PATIENT-LVL IV: CPT | Mod: PBBFAC,,, | Performed by: INTERNAL MEDICINE

## 2025-03-21 RX ORDER — TAMSULOSIN HYDROCHLORIDE 0.4 MG/1
0.8 CAPSULE ORAL DAILY
Qty: 180 CAPSULE | Refills: 3 | Status: SHIPPED | OUTPATIENT
Start: 2025-03-21

## 2025-03-21 RX ORDER — AMLODIPINE BESYLATE 10 MG/1
10 TABLET ORAL DAILY
Qty: 90 TABLET | Refills: 3 | Status: SHIPPED | OUTPATIENT
Start: 2025-03-21

## 2025-03-21 RX ORDER — ROSUVASTATIN CALCIUM 20 MG/1
20 TABLET, COATED ORAL DAILY
Qty: 90 TABLET | Refills: 3 | Status: SHIPPED | OUTPATIENT
Start: 2025-03-21

## 2025-03-21 RX ORDER — HYDROCHLOROTHIAZIDE 25 MG/1
25 TABLET ORAL DAILY
Qty: 90 TABLET | Refills: 3 | Status: SHIPPED | OUTPATIENT
Start: 2025-03-21

## 2025-03-21 NOTE — PROGRESS NOTES
Mr. Lackey is a 65 -year-old gentleman coming   in today to follow up on his ongoing medical problems and to discuss holistic approach to improve his health.   He was last seen by me  in 9/2024 . His medical problems include:          1. Hypertension. He is on Norvasc 10 mg a day. He has had hypertension   for several years.  bp 128/80.    He is not checking his bp at home. Has signed up  with digital hypertension program,  should be getting device soon .no med changes. He denies any chest pain, short-windedness, palpitations, nausea or vomiting.        2. He has hyperlipidemia. He is on rousovastatin  increased to 20 mg in Feb of this year-- He was on  pravastatin 40 mg a day. Last lipid panel was 9/27/2024, LDL was 87.8!.         3. He has vitamin D deficiency with history of sarcoidosis. We put him on   vitamin D. He is on a 2 x monthly vitamin D. In April 2024 his Vit was 33-  he continues on his vitamin-D.- and he is taking one a week-.        4. Sarcoidosis. .He was 1st diagnosed with sarcoid in the 1990sMost prominent sarcoid issue is uveitis glaucoma.  He is seeing opthomology and see Dr Davila in Rheumatology-- last seen 2/2022.  He was on MTX for a while, but is off this now.  He denies any respiratory symptoms including wheezing or exercise intolerance. Has not used inhaler in a year. Had some fibrosis on chest ct of          5. 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          6. Prostate cancer -- dx in 3/2020--   low risk prostate CA,  continued active surveillance.  He saw Urology last 10/4/2024.  Note reviewed.  Recommend active surveillance.  Due back to see Urology next  month with psa.         7. Prediabetes-  5.9 in  Sept 2024-- diet stable                  ROS : Gen - no fatigue or significant weight change-- wt is up 3#   Eyes - no eye  "pain.  ENT - no hoarseness or sore throat  CV - No chest pain NO palpitations.   Pulm - no cough, wheezing or dyspnea of exertion-- otherwise as above.   GI - no N/V/D   no dysuria or incontinence  MS - no joint pain or muscle pain  Skin - no rash, or c/o of skin lesions  Neuro - no HA, dizziness--- memory is doing well.   Heme - no abnormal bleeding or bruising  Endo - no polydipsia, or temperature changes  Psych - no anxiety or depression           /80 (BP Location: Left arm, Patient Position: Sitting)   Pulse (!) 56   Ht 5' 9" (1.753 m)   Wt 84.8 kg (186 lb 15.2 oz)   SpO2 100%   BMI 27.61 kg/m²           GENERAL: He is a well-appearing 65 --year-old gentleman in no acute   distress.  Poor vision- Legally blind.  Here by himself today    Ear examination was unremarkable.   Eyes- sclera is injected but normal for him- no ptosis.   NECK: Supple. He has no JVD. Thyroid is not enlarged. Chest- CTA bilaterally.   CARDIOVASCULAR: S1, S2. Regular rate and rhythm.   LUNGS: Clear bilaterally- no wheeze.   ABDOMEN: Soft, nontender, no hepatosplenomegaly, no guarding, rebound or   tenderness. Slight distended.   LOWER EXTREMITIES: No edema.  NO hair on legs.       ASSESSMENT:      1. Hypertension, -continue the hctz 25 mg a day and  Amlodipine  10 mg a day   2. Hyperlipidemia Continue the rousovastatin will change form two 10 mag a day to one 20 mg pill a day-- check cholesterol today    3. Sarcoidosis, Reviewed notes from Rheum,--follow up  with Rheum.  Has lung disease but no sob-   -- has calcified lung nodule -- will monitor   due to follow up with Rheum next month.  I don't see appointment   4. Prediabetes- discussed diet-  will check labs  - due for follow up.    5. . Prostate Cancer--active surveillance  He last saw Urology in 10/2024.--- is doing active surveillance. FU with urology- in Oct 2024.    Will follow up in 6 months.        Pneumonia 20  today  - will get labs-- get him back in Rheum       "   Our office providers are the focal point for all needed health care services that are part of ongoing care related to a patient's single serious condition or the patient's complex conditions.

## 2025-04-08 DIAGNOSIS — E55.9 VITAMIN D DEFICIENCY DISEASE: Primary | ICD-10-CM

## 2025-04-08 NOTE — TELEPHONE ENCOUNTER
Care Due:                  Date            Visit Type   Department     Provider  --------------------------------------------------------------------------------                                EP -                              PRIMARY      [facility] INTERNAL  Last Visit: 03-      CARE ([facility])   MATT Desir                               -                              PRIMARY      [facility] INTERNAL  Next Visit: 09-      CARE ([facility])   MEDICINE       Carloz Desir                                                            Last  Test          Frequency    Reason                     Performed    Due Date  --------------------------------------------------------------------------------    Vitamin D...  12 months..  ergocalciferol...........  04-   04-    [facility] Embedded Care Due Messages. Reference number: [id number].   4/08/2025 5:01:06 PM CDT

## 2025-04-09 RX ORDER — ERGOCALCIFEROL 1.25 MG/1
CAPSULE ORAL
Qty: 12 CAPSULE | Refills: 0 | Status: SHIPPED | OUTPATIENT
Start: 2025-04-09 | End: 2025-04-09

## 2025-04-09 RX ORDER — ERGOCALCIFEROL 1.25 MG/1
CAPSULE ORAL
Qty: 12 CAPSULE | Refills: 0 | Status: SHIPPED | OUTPATIENT
Start: 2025-04-09

## 2025-04-10 ENCOUNTER — TELEPHONE (OUTPATIENT)
Dept: INTERNAL MEDICINE | Facility: CLINIC | Age: 65
End: 2025-04-10

## 2025-04-10 DIAGNOSIS — C61 PROSTATE CANCER: ICD-10-CM

## 2025-04-10 RX ORDER — TAMSULOSIN HYDROCHLORIDE 0.4 MG/1
0.4 CAPSULE ORAL 2 TIMES DAILY
Qty: 180 CAPSULE | Refills: 3 | Status: SHIPPED | OUTPATIENT
Start: 2025-04-10

## 2025-04-10 NOTE — TELEPHONE ENCOUNTER
----- Message from Med Assistant Benavides sent at 4/10/2025  1:21 PM CDT -----  Contact: tamsulosin (FLOMAX) 0.4 mg Cap  Type:  Pharmacy Calling to Clarify an RXName of Caller:Sai [facility]:tamsulosin (FLOMAX) 0.4 mg CapWhat do they need to clarify?:[facility] Call Back Number:[phone] Information: n/a

## 2025-05-09 ENCOUNTER — TELEPHONE (OUTPATIENT)
Dept: INTERNAL MEDICINE | Facility: CLINIC | Age: 65
End: 2025-05-09
Payer: MEDICARE

## 2025-05-09 NOTE — TELEPHONE ENCOUNTER
Copied from CRM #[id number]. Topic: General Inquiry - Status Check  >> May 9, 2025 12:55 PM Barbara wrote:  Patient would like to get medical advice.  Symptoms (please be specific):   Pt states the form [employer] received from Dr Desir had a March date on it not an April 1st date. Pt states this was the form to verify his chronic conditions for the [employer] chronic conditions needs plan.   How long have you had these symptoms:   Would you like a call back, or a response through your [facility] portal?:call back to pt      Pharmacy name and phone # (copy from chart):   N/A  Comments:

## 2025-05-16 ENCOUNTER — TELEPHONE (OUTPATIENT)
Dept: OPHTHALMOLOGY | Facility: CLINIC | Age: 65
End: 2025-05-16
Payer: MEDICARE

## 2025-05-16 NOTE — TELEPHONE ENCOUNTER
Returned pt's call but 1st number is not working and so I left message on 2nd number listed in file

## 2025-05-16 NOTE — TELEPHONE ENCOUNTER
----- Message from Sonja sent at 5/16/2025 10:21 AM CDT -----  Pt calling to reschedule ozzie , for next available , pt will be back in town on 5-29 Confirmed patient's contact info below:Contact Name: Jaguar LackeyPhone Number: [phone]

## 2025-05-26 ENCOUNTER — TELEPHONE (OUTPATIENT)
Dept: INTERNAL MEDICINE | Facility: CLINIC | Age: 65
End: 2025-05-26
Payer: MEDICARE

## 2025-05-26 NOTE — TELEPHONE ENCOUNTER
----- Message from Jena sent at 5/26/2025  8:59 AM CDT -----  Contact: Patient [phone]  .1MEDICALADVICE Patient is calling for Medical Advice regarding:Good Morning, Patient is calling to check if he can come in today to have a medical form drop off for Dr to signHow long has patient had these symptoms:Pharmacy name and phone#:Patient wants a call back or thru [facility]:call Comments:Please advise patient replies from provider may take up to 48 hours.

## 2025-06-08 NOTE — PROGRESS NOTES
HPI    DLS: 1/13/2025    Pt here for 4 Month Check;  Pt states he's been having off and on tearing to the eyes. Pt states   vision is about the same cloudiness is getting heavier.     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            Assessment /Plan     For exam results, see Encounter Report.    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    Postinflammatory optic atrophy, left    Band keratopathy, left    Steroid responders borderline glaucoma, bilateral    Pas (periph anterior synechiae), bilateral    Legal blindness - Both Eyes    Pseudophakia of both eyes    S/P YAG capsulotomy, right        IOP was up to 64 OD off all meds  (7/15/2014)  Was still too high back on maximum meds -- had baerveldt OD - 9/2014   Lost to F/U 8/11/2022 to 5/9/2024 ( about 1.75 years) - still using gtts   Pt did a 9 month boarding training course - with [facility] affiliated [facility] - very helpful   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 Uveitic glaucoma - Both Eyes OD>OS  2/2 sarcoidosis  Old pt of Dr. Wagner and Dr. Menard  First F 2012  First photos 2011     Family history   neg  Glaucoma meds   Off all drops and diamox 2013 to 2014  (pt lost insurance)(( had +++ progression off gtts)                              back on gtts combigan/dorzolamide OU and latanoprost OS  H/O adverse rxn to glaucoma drops  None  LASERS   yag cap od 4/4/2019 - did not help   GLAUCOMA SURGERIES   Trab OD (3/20/ 2010 -Derian), Bleb Revision OD  3/21/2012 Loft// complex phaco/Baerveldt od 9/24/2014  OTHER EYE SURGERIES   PPV/Endo  "O5, CE OS (04)  CDR  0.95/0.5  Tbase  ?    Tmax  36/22 on meds      Ttarget   16/20       HVF  10-2 stim III OD (3/5/12)-sup arc,inf alt, tiny residual island OD; 24-2 OS w/ marked general depression             3/4/2013 ---HVF 10-2 stim III OD - SAD / Inf Alt los s            OD - 7 test 10-2 stim V - 2014 to 2020  - Ext - + prog (NO MORE TESTING OD)             OS - 7 test 7259-0373   24-2 - stim V OS  // Inf Alt loss/SAD  - stable            PT DECLINES FURTHER VF TESTING     Gonio -  PAS OD - 360 // OS -  f    CCT  525/531  OCT  7 test 2011 to 2021   - dec. S/I/N od // Dec S/N/I   HRT  8 test 2012 to 2019  - MR -  Dec. S/T, bord I/N od // xx os /// CDR 9.78 od // xx os  Disc photos  2011, 2013, 2014, 2015, 2018, 2020   - OIS     Ttoday 9/5  ((down from 23/16 - now off steroid gtts od ))  Test done today IOP // Iritis check      2. Secondary iritis - Both Eyes      3. 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 Postinflammatory optic atrophy - Left Eye   -Sarcoid, + APD, uncertain etiology     5. Relative afferent pupillary defect OS     6. Steroid responders borderline glaucoma - Both Eyes -increased IOP w/ Pred Acetate. Does better w/ Vexol     7. Legal blindness - Both EyeS OD based on VF loss and OS based on [place].     8. Band keratopathy os   - chelation with [facility] 9/24/2021     9 Pseudophakic OD  S/P complex phaco/IOL / baerveldt od 9/24/2014   PC IOL os - done years ago - ? [facility] - ? IOL sewn in        Plan:  Uveitic glaucoma  Angle closure - PAS ou   Lost to F/U for 10 months from 9/2013 to 7/2014  - VA od went  from 20/40 to LP  Was already CF at 3 ft OS- 2/2 ON pallor from sarcoid  IOP OD was 64 - off drops since 9/2013 - when re-presented in 7/2014     Pt is "legally blind" - he may want to apply for disability.    Pt previously had issues with insurance, but now has new insurance.  Discussed with patient that the vision loss od is irreversible 2/2 glaucoma and the importance of " compliance with gtt/meds.      Developed  anterior chamber fibrin OD  Post GDD  - it has has resolved - but prone to iritis with any intervention     Pt has seen the pulmonologist  Has seen an internist   Has  seen rheumatology     Was  on MTX for sarcoid and iritis - off - stopped on his own - seems to be doing ok (since 5/30/2018)  No increase in inflammationn od on 7/30/2018 // still ok off steroid gtts 11/12/2018 // still ok 1/2025     - Cont combigan tid OU   - cont  dorzolamide OU -  Tid   - cont latanoprost - - OS only     CSM - glaucoma gtts   Stay off steroid gtts for now      yag cap od - 4/4/2019 - did NOT improve the vision     Dry eyes   Gel gtts q hs os and ointment q hs os     Photo file updated 12/16/2021    If needed  can try rhopressa - in either or both eyes     1/13/2025   IOP good ou  Exam stable   Pt wants to try [employer] meta wayfare smart glasses - he thinks his [employer] insurance will pay   Rx written - told to reheck and be sure his insurance will cover it before purchasing from [employer]     6/9/2025  IOP ok  AC quiet   CSM    F/U 4 months with  IOP check and  Iritis check // gonio   // declines further VF testing // DFE // photos

## 2025-06-09 ENCOUNTER — CLINICAL SUPPORT (OUTPATIENT)
Dept: INTERNAL MEDICINE | Facility: CLINIC | Age: 65
End: 2025-06-09
Payer: MEDICARE

## 2025-06-09 ENCOUNTER — OFFICE VISIT (OUTPATIENT)
Dept: OPHTHALMOLOGY | Facility: CLINIC | Age: 65
End: 2025-06-09
Payer: MEDICARE

## 2025-06-09 DIAGNOSIS — Z98.890 S/P YAG CAPSULOTOMY, RIGHT: ICD-10-CM

## 2025-06-09 DIAGNOSIS — H21.523 PAS (PERIPH ANTERIOR SYNECHIAE), BILATERAL: ICD-10-CM

## 2025-06-09 DIAGNOSIS — H54.8 LEGAL BLINDNESS: ICD-10-CM

## 2025-06-09 DIAGNOSIS — H40.043 STEROID RESPONDERS BORDERLINE GLAUCOMA, BILATERAL: ICD-10-CM

## 2025-06-09 DIAGNOSIS — Z23 ENCOUNTER FOR PREVNAR PNEUMOCOCCAL VACCINATION: Primary | ICD-10-CM

## 2025-06-09 DIAGNOSIS — D86.83 IRIDOCYCLITIS DUE TO SARCOIDOSIS: ICD-10-CM

## 2025-06-09 DIAGNOSIS — H20.9 UVEITIC GLAUCOMA, BILATERAL, SEVERE STAGE: Primary | ICD-10-CM

## 2025-06-09 DIAGNOSIS — H47.292 POSTINFLAMMATORY OPTIC ATROPHY, LEFT: ICD-10-CM

## 2025-06-09 DIAGNOSIS — H18.422 BAND KERATOPATHY, LEFT: ICD-10-CM

## 2025-06-09 DIAGNOSIS — Z96.1 PSEUDOPHAKIA OF BOTH EYES: ICD-10-CM

## 2025-06-09 DIAGNOSIS — H40.43X3 UVEITIC GLAUCOMA, BILATERAL, SEVERE STAGE: Primary | ICD-10-CM

## 2025-06-09 PROCEDURE — G0009 ADMIN PNEUMOCOCCAL VACCINE: HCPCS | Mod: S$GLB,,, | Performed by: INTERNAL MEDICINE

## 2025-06-09 PROCEDURE — 3044F HG A1C LEVEL LT 7.0%: CPT | Mod: CPTII,S$GLB,, | Performed by: OPHTHALMOLOGY

## 2025-06-09 PROCEDURE — 99214 OFFICE O/P EST MOD 30 MIN: CPT | Mod: S$GLB,,, | Performed by: OPHTHALMOLOGY

## 2025-06-09 PROCEDURE — 90677 PCV20 VACCINE IM: CPT | Mod: S$GLB,,, | Performed by: INTERNAL MEDICINE

## 2025-06-09 PROCEDURE — 99999 PR PBB SHADOW E&M-EST. PATIENT-LVL III: CPT | Mod: PBBFAC,,, | Performed by: OPHTHALMOLOGY

## 2025-06-09 PROCEDURE — 1160F RVW MEDS BY RX/DR IN RCRD: CPT | Mod: CPTII,S$GLB,, | Performed by: OPHTHALMOLOGY

## 2025-06-09 PROCEDURE — 1101F PT FALLS ASSESS-DOCD LE1/YR: CPT | Mod: CPTII,S$GLB,, | Performed by: OPHTHALMOLOGY

## 2025-06-09 PROCEDURE — 3288F FALL RISK ASSESSMENT DOCD: CPT | Mod: CPTII,S$GLB,, | Performed by: OPHTHALMOLOGY

## 2025-06-09 PROCEDURE — 1159F MED LIST DOCD IN RCRD: CPT | Mod: CPTII,S$GLB,, | Performed by: OPHTHALMOLOGY
